# Patient Record
Sex: MALE | Race: BLACK OR AFRICAN AMERICAN | NOT HISPANIC OR LATINO | URBAN - METROPOLITAN AREA
[De-identification: names, ages, dates, MRNs, and addresses within clinical notes are randomized per-mention and may not be internally consistent; named-entity substitution may affect disease eponyms.]

---

## 2017-04-24 ENCOUNTER — INPATIENT (INPATIENT)
Facility: HOSPITAL | Age: 82
LOS: 3 days | Discharge: HOME | DRG: 378 | End: 2017-04-28
Attending: SURGERY | Admitting: SURGERY
Payer: MEDICARE

## 2017-04-24 VITALS
RESPIRATION RATE: 18 BRPM | WEIGHT: 160.06 LBS | SYSTOLIC BLOOD PRESSURE: 136 MMHG | HEIGHT: 66 IN | HEART RATE: 68 BPM | TEMPERATURE: 97 F | OXYGEN SATURATION: 94 % | DIASTOLIC BLOOD PRESSURE: 82 MMHG

## 2017-04-24 DIAGNOSIS — Z98.89 OTHER SPECIFIED POSTPROCEDURAL STATES: Chronic | ICD-10-CM

## 2017-04-24 LAB
ALBUMIN SERPL ELPH-MCNC: 3.2 G/DL — LOW (ref 3.4–5)
ALP SERPL-CCNC: 41 U/L — SIGNIFICANT CHANGE UP (ref 40–120)
ALT FLD-CCNC: 25 U/L — SIGNIFICANT CHANGE UP (ref 12–42)
ANION GAP SERPL CALC-SCNC: 3 MMOL/L — LOW (ref 9–16)
AST SERPL-CCNC: 33 U/L — SIGNIFICANT CHANGE UP (ref 15–37)
BASOPHILS NFR BLD AUTO: 0.4 % — SIGNIFICANT CHANGE UP (ref 0–2)
BASOPHILS NFR BLD AUTO: 0.5 % — SIGNIFICANT CHANGE UP (ref 0–2)
BILIRUB SERPL-MCNC: 0.7 MG/DL — SIGNIFICANT CHANGE UP (ref 0.2–1.2)
BLD GP AB SCN SERPL QL: NEGATIVE — SIGNIFICANT CHANGE UP
BUN SERPL-MCNC: 22 MG/DL — SIGNIFICANT CHANGE UP (ref 7–23)
CALCIUM SERPL-MCNC: 8.8 MG/DL — SIGNIFICANT CHANGE UP (ref 8.5–10.5)
CHLORIDE SERPL-SCNC: 108 MMOL/L — SIGNIFICANT CHANGE UP (ref 96–108)
CO2 SERPL-SCNC: 29 MMOL/L — SIGNIFICANT CHANGE UP (ref 22–31)
CREAT SERPL-MCNC: 0.76 MG/DL — SIGNIFICANT CHANGE UP (ref 0.5–1.3)
EOSINOPHIL NFR BLD AUTO: 0.4 % — SIGNIFICANT CHANGE UP (ref 0–6)
EOSINOPHIL NFR BLD AUTO: 0.7 % — SIGNIFICANT CHANGE UP (ref 0–6)
GLUCOSE SERPL-MCNC: 100 MG/DL — HIGH (ref 70–99)
HCT VFR BLD CALC: 36 % — LOW (ref 39–50)
HCT VFR BLD CALC: 36.2 % — LOW (ref 39–50)
HCT VFR BLD CALC: 37.4 % — LOW (ref 39–50)
HGB BLD-MCNC: 12 G/DL — LOW (ref 13–17)
HGB BLD-MCNC: 12.1 G/DL — LOW (ref 13–17)
HGB BLD-MCNC: 12.6 G/DL — LOW (ref 13–17)
INR BLD: 3.85 — HIGH (ref 0.88–1.16)
LYMPHOCYTES # BLD AUTO: 29.4 % — SIGNIFICANT CHANGE UP (ref 13–44)
LYMPHOCYTES # BLD AUTO: 40.1 % — SIGNIFICANT CHANGE UP (ref 13–44)
MCHC RBC-ENTMCNC: 29.8 PG — SIGNIFICANT CHANGE UP (ref 27–34)
MCHC RBC-ENTMCNC: 29.9 PG — SIGNIFICANT CHANGE UP (ref 27–34)
MCHC RBC-ENTMCNC: 30 PG — SIGNIFICANT CHANGE UP (ref 27–34)
MCHC RBC-ENTMCNC: 33.3 G/DL — SIGNIFICANT CHANGE UP (ref 32–36)
MCHC RBC-ENTMCNC: 33.4 G/DL — SIGNIFICANT CHANGE UP (ref 32–36)
MCHC RBC-ENTMCNC: 33.7 G/DL — SIGNIFICANT CHANGE UP (ref 32–36)
MCV RBC AUTO: 89 FL — SIGNIFICANT CHANGE UP (ref 80–100)
MCV RBC AUTO: 89.2 FL — SIGNIFICANT CHANGE UP (ref 80–100)
MCV RBC AUTO: 89.6 FL — SIGNIFICANT CHANGE UP (ref 80–100)
MONOCYTES NFR BLD AUTO: 11.6 % — SIGNIFICANT CHANGE UP (ref 2–14)
MONOCYTES NFR BLD AUTO: 18.1 % — HIGH (ref 2–14)
NEUTROPHILS NFR BLD AUTO: 47.1 % — SIGNIFICANT CHANGE UP (ref 43–77)
NEUTROPHILS NFR BLD AUTO: 51.7 % — SIGNIFICANT CHANGE UP (ref 43–77)
PLATELET # BLD AUTO: 458 K/UL — HIGH (ref 150–400)
PLATELET # BLD AUTO: 486 K/UL — HIGH (ref 150–400)
PLATELET # BLD AUTO: 497 K/UL — HIGH (ref 150–400)
POTASSIUM SERPL-MCNC: 4.8 MMOL/L — SIGNIFICANT CHANGE UP (ref 3.5–5.3)
POTASSIUM SERPL-SCNC: 4.8 MMOL/L — SIGNIFICANT CHANGE UP (ref 3.5–5.3)
PROT SERPL-MCNC: 8.4 G/DL — HIGH (ref 6.4–8.2)
PROTHROM AB SERPL-ACNC: 43.9 SEC — HIGH (ref 9.8–12.7)
RBC # BLD: 4.02 M/UL — LOW (ref 4.2–5.8)
RBC # BLD: 4.06 M/UL — LOW (ref 4.2–5.8)
RBC # BLD: 4.2 M/UL — SIGNIFICANT CHANGE UP (ref 4.2–5.8)
RBC # FLD: 15.5 % — SIGNIFICANT CHANGE UP (ref 10.3–16.9)
RBC # FLD: 15.8 % — SIGNIFICANT CHANGE UP (ref 10.3–16.9)
RBC # FLD: 15.8 % — SIGNIFICANT CHANGE UP (ref 10.3–16.9)
RH IG SCN BLD-IMP: POSITIVE — SIGNIFICANT CHANGE UP
SODIUM SERPL-SCNC: 140 MMOL/L — SIGNIFICANT CHANGE UP (ref 135–145)
WBC # BLD: 4.4 K/UL — SIGNIFICANT CHANGE UP (ref 3.8–10.5)
WBC # BLD: 5 K/UL — SIGNIFICANT CHANGE UP (ref 3.8–10.5)
WBC # BLD: 5 K/UL — SIGNIFICANT CHANGE UP (ref 3.8–10.5)
WBC # FLD AUTO: 4.4 K/UL — SIGNIFICANT CHANGE UP (ref 3.8–10.5)
WBC # FLD AUTO: 5 K/UL — SIGNIFICANT CHANGE UP (ref 3.8–10.5)
WBC # FLD AUTO: 5 K/UL — SIGNIFICANT CHANGE UP (ref 3.8–10.5)

## 2017-04-24 PROCEDURE — 99285 EMERGENCY DEPT VISIT HI MDM: CPT | Mod: 25

## 2017-04-24 PROCEDURE — 93010 ELECTROCARDIOGRAM REPORT: CPT

## 2017-04-24 RX ORDER — SODIUM CHLORIDE 9 MG/ML
500 INJECTION INTRAMUSCULAR; INTRAVENOUS; SUBCUTANEOUS ONCE
Qty: 0 | Refills: 0 | Status: COMPLETED | OUTPATIENT
Start: 2017-04-24 | End: 2017-04-24

## 2017-04-24 RX ORDER — SOD SULF/SODIUM/NAHCO3/KCL/PEG
4000 SOLUTION, RECONSTITUTED, ORAL ORAL ONCE
Qty: 0 | Refills: 0 | Status: COMPLETED | OUTPATIENT
Start: 2017-04-24 | End: 2017-04-25

## 2017-04-24 RX ORDER — SODIUM CHLORIDE 9 MG/ML
1000 INJECTION, SOLUTION INTRAVENOUS
Qty: 0 | Refills: 0 | Status: DISCONTINUED | OUTPATIENT
Start: 2017-04-24 | End: 2017-04-27

## 2017-04-24 RX ORDER — PHYTONADIONE (VIT K1) 5 MG
10 TABLET ORAL ONCE
Qty: 0 | Refills: 0 | Status: COMPLETED | OUTPATIENT
Start: 2017-04-24 | End: 2017-04-24

## 2017-04-24 RX ORDER — TAMSULOSIN HYDROCHLORIDE 0.4 MG/1
0.4 CAPSULE ORAL AT BEDTIME
Qty: 0 | Refills: 0 | Status: DISCONTINUED | OUTPATIENT
Start: 2017-04-24 | End: 2017-04-28

## 2017-04-24 RX ORDER — LATANOPROST 0.05 MG/ML
1 SOLUTION/ DROPS OPHTHALMIC; TOPICAL AT BEDTIME
Qty: 0 | Refills: 0 | Status: DISCONTINUED | OUTPATIENT
Start: 2017-04-24 | End: 2017-04-28

## 2017-04-24 RX ORDER — PANTOPRAZOLE SODIUM 20 MG/1
40 TABLET, DELAYED RELEASE ORAL ONCE
Qty: 0 | Refills: 0 | Status: COMPLETED | OUTPATIENT
Start: 2017-04-24 | End: 2017-04-24

## 2017-04-24 RX ORDER — BRIMONIDINE TARTRATE 2 MG/MG
1 SOLUTION/ DROPS OPHTHALMIC
Qty: 0 | Refills: 0 | Status: DISCONTINUED | OUTPATIENT
Start: 2017-04-24 | End: 2017-04-28

## 2017-04-24 RX ADMIN — LATANOPROST 1 DROP(S): 0.05 SOLUTION/ DROPS OPHTHALMIC; TOPICAL at 23:50

## 2017-04-24 RX ADMIN — TAMSULOSIN HYDROCHLORIDE 0.4 MILLIGRAM(S): 0.4 CAPSULE ORAL at 23:48

## 2017-04-24 RX ADMIN — SODIUM CHLORIDE 500 MILLILITER(S): 9 INJECTION INTRAMUSCULAR; INTRAVENOUS; SUBCUTANEOUS at 14:11

## 2017-04-24 RX ADMIN — SODIUM CHLORIDE 500 MILLILITER(S): 9 INJECTION INTRAMUSCULAR; INTRAVENOUS; SUBCUTANEOUS at 19:47

## 2017-04-24 RX ADMIN — Medication 102 MILLIGRAM(S): at 20:55

## 2017-04-24 RX ADMIN — PANTOPRAZOLE SODIUM 40 MILLIGRAM(S): 20 TABLET, DELAYED RELEASE ORAL at 14:11

## 2017-04-24 NOTE — H&P ADULT - NSHPPHYSICALEXAM_GEN_ALL_CORE
Vital Signs Last 24 Hrs  T(C): 36.6, Max: 36.8 (04-24 @ 14:30)  T(F): 97.8, Max: 98.3 (04-24 @ 14:30)  HR: 85 (68 - 85)  BP: 139/77 (136/82 - 139/77)  BP(mean): --  RR: 18 (18 - 18)  SpO2: 97% (94% - 97%)    Physical Exam:   Gen: AOx3, pleasant in NAD  Cor: RRR, +S1S2, no MRG  Pulm: CTA b/l No W/R/S  Abd: +BS, Soft, non distended, non tender. Rectal exam with visible external hemorrhoids, non tender, anal sphincter with normal tone, examiners glove positive for BRB  Ext: WWP, edema +1

## 2017-04-24 NOTE — H&P ADULT - ASSESSMENT
81M pt with BRBPR    -Admit to Surgery, ACS, Stacy, Telemetry  -NPO  -IVF  -Strict I/O, if perfusion status unclear, place díaz  -CBC q6h  -Given 10mg Vit K in ED for INR 3.85, f/u AM INR, Hematology on Board, Dr. Parks - F/U recs  -Lower extremity Doppler to assess for DVT, as per Dr. Parks, will reconsider anticoagulation in the future, if deemed necessary once outweighing risks and benefits  -GI consult - Golytely prep, Colonoscopy in AM   -If Large BRBPR or HDUS will obtain CTA  -Home eyedrops for Glaucoma

## 2017-04-24 NOTE — ED ADULT NURSE NOTE - OBJECTIVE STATEMENT
Pt presents to ED A&Ox3 c/o bloody stools since this morning. pt denies cp/sob, n/v/d, abdominal pain, fevers. abdomen soft, nontender, non distended. pt on coumadin.

## 2017-04-24 NOTE — ED PROVIDER NOTE - PHYSICAL EXAMINATION
CONSTITUTIONAL: Well appearing, well nourished, awake, alert and in no apparent distress.  HEENT: Head is atraumatic. Eyes clear bilaterally, normal EOMI. Airway patent.  CARDIAC: Normal rate, regular rhythm.  Heart sounds S1, S2.   RESPIRATORY: Breath sounds clear and equal bilaterally.  GASTROINTESTINAL: Abdomen soft, non-tender, no guarding. Rectal: dark red blood mixed with mucus in vault, no active bleeding.  MUSCULOSKELETAL: Spine appears normal, range of motion is not limited, no muscle or joint tenderness.   NEUROLOGICAL: Alert and oriented, no focal deficits, no motor or sensory deficits.  SKIN: Skin normal color for race, warm, dry and intact. No evidence of rash.  PSYCHIATRIC: Alert and oriented to person, place, time/situation. normal mood and affect. no apparent risk to self or others.

## 2017-04-24 NOTE — ED PROVIDER NOTE - OBJECTIVE STATEMENT
[age] yo hx of essential thrombocytopenia, multiple myeloma, prostate CA w/  few episodes of bloody bowel movements started this morning, on coumadin for LE DVTs. no abd pain, n/v, fever. No lightheadedness, syncope, weakness.

## 2017-04-24 NOTE — H&P ADULT - NSHPLABSRESULTS_GEN_ALL_CORE
LABS:  CBC Full  -  ( 24 Apr 2017 18:26 )  WBC Count : 5.0 K/uL  Hemoglobin : 12.6 g/dL  Hematocrit : 37.4 %  Platelet Count - Automated : 497 K/uL  Mean Cell Volume : 89.0 fL  Mean Cell Hemoglobin : 30.0 pg  Mean Cell Hemoglobin Concentration : 33.7 g/dL  Auto Neutrophil # : x  Auto Lymphocyte # : x  Auto Monocyte # : x  Auto Eosinophil # : x  Auto Basophil # : x  Auto Neutrophil % : x  Auto Lymphocyte % : x  Auto Monocyte % : x  Auto Eosinophil % : x  Auto Basophil % : x    04-24    140  |  108  |  22  ----------------------------<  100<H>  4.8   |  29  |  0.76    Ca    8.8      24 Apr 2017 13:25    TPro  8.4<H>  /  Alb  3.2<L>  /  TBili  0.7  /  DBili  x   /  AST  33  /  ALT  25  /  AlkPhos  41  04-24    PT/INR - ( 24 Apr 2017 13:25 )   PT: 43.9 sec;   INR: 3.85        PTT - ( 24 Apr 2017 13:25 )  PTT:50.6 sec

## 2017-04-24 NOTE — ED ADULT NURSE NOTE - CHPI ED SYMPTOMS NEG
no tingling/no pain/no nausea/no decreased eating/drinking/no dizziness/no fever/no chills/no vomiting/no weakness/no numbness

## 2017-04-24 NOTE — ED ADULT NURSE NOTE - PMH
BPH (benign prostatic hyperplasia)    DVT (deep venous thrombosis)    Glaucoma    Myeloma    Prostate cancer

## 2017-04-24 NOTE — ED PROVIDER NOTE - CHPI ED SYMPTOMS NEG
no dysuria/no fever/no burning urination/no chills/no nausea/no abdominal distension/no hematuria/no vomiting

## 2017-04-24 NOTE — H&P ADULT - HISTORY OF PRESENT ILLNESS
81M pt w/PMH Prostate Ca, Multiple myeloma, essential thrombocythemia, recurrent DVT of unspecified lower extremity (on coumadin as per Dr. Parks for recurrent DVT and 2 malignancies), BPH, and glaucoma, presents to [facility] ED w/3 episodes of BRBPR, started this morning at 8am, in ED patient had 6 episodes of BRBPR. Denies any previous episode. Denies any CP/SOB/PALP/LH.  Had a colonoscopy about 4 years ago, 1 benign polyp excised.     In ED patient is HDS, serial H/H stable,(Hgb persistently 12's), INR supratherapeutic at 3.85 given 10mg of Vit K.

## 2017-04-24 NOTE — ED ADULT NURSE REASSESSMENT NOTE - NS ED NURSE REASSESS COMMENT FT1
Pt report 4 bloody BM since being in ER. Dr Foster aware. repeat CBC sent. VSS. Will continue to monitor.

## 2017-04-24 NOTE — ED PROVIDER NOTE - MEDICAL DECISION MAKING DETAILS
Pt with essential thrombocytopenia, multiple myeloma, prostate CA w/  few episodes of bloody bowel movements started this morning, on coumadin for LE DVTs. Repeat CBCs obtained in ED stable, mentating well. Discussed care with pt's hematologist given complex hx, ok to give vit K, can hold on FFP/PCC now. discussed care with surgery, GI also- to admit to surgery for cont'd mgt.

## 2017-04-25 DIAGNOSIS — K92.2 GASTROINTESTINAL HEMORRHAGE, UNSPECIFIED: ICD-10-CM

## 2017-04-25 DIAGNOSIS — I82.409 ACUTE EMBOLISM AND THROMBOSIS OF UNSPECIFIED DEEP VEINS OF UNSPECIFIED LOWER EXTREMITY: ICD-10-CM

## 2017-04-25 DIAGNOSIS — D47.3 ESSENTIAL (HEMORRHAGIC) THROMBOCYTHEMIA: ICD-10-CM

## 2017-04-25 DIAGNOSIS — C90.00 MULTIPLE MYELOMA NOT HAVING ACHIEVED REMISSION: ICD-10-CM

## 2017-04-25 LAB
ANION GAP SERPL CALC-SCNC: 5 MMOL/L — LOW (ref 9–16)
ANION GAP SERPL CALC-SCNC: 6 MMOL/L — LOW (ref 9–16)
ANION GAP SERPL CALC-SCNC: 7 MMOL/L — LOW (ref 9–16)
APTT BLD: 31 SEC — SIGNIFICANT CHANGE UP (ref 27.5–37.4)
APTT BLD: 31.1 SEC — SIGNIFICANT CHANGE UP (ref 27.5–37.4)
APTT BLD: 38.7 SEC — HIGH (ref 27.5–37.4)
BUN SERPL-MCNC: 10 MG/DL — SIGNIFICANT CHANGE UP (ref 7–23)
BUN SERPL-MCNC: 14 MG/DL — SIGNIFICANT CHANGE UP (ref 7–23)
BUN SERPL-MCNC: 17 MG/DL — SIGNIFICANT CHANGE UP (ref 7–23)
CALCIUM SERPL-MCNC: 7.6 MG/DL — LOW (ref 8.5–10.5)
CALCIUM SERPL-MCNC: 7.8 MG/DL — LOW (ref 8.5–10.5)
CALCIUM SERPL-MCNC: 8.2 MG/DL — LOW (ref 8.5–10.5)
CHLORIDE SERPL-SCNC: 111 MMOL/L — HIGH (ref 96–108)
CHLORIDE SERPL-SCNC: 113 MMOL/L — HIGH (ref 96–108)
CHLORIDE SERPL-SCNC: 113 MMOL/L — HIGH (ref 96–108)
CO2 SERPL-SCNC: 22 MMOL/L — SIGNIFICANT CHANGE UP (ref 22–31)
CO2 SERPL-SCNC: 24 MMOL/L — SIGNIFICANT CHANGE UP (ref 22–31)
CO2 SERPL-SCNC: 24 MMOL/L — SIGNIFICANT CHANGE UP (ref 22–31)
CREAT SERPL-MCNC: 0.65 MG/DL — SIGNIFICANT CHANGE UP (ref 0.5–1.3)
CREAT SERPL-MCNC: 0.65 MG/DL — SIGNIFICANT CHANGE UP (ref 0.5–1.3)
CREAT SERPL-MCNC: 0.66 MG/DL — SIGNIFICANT CHANGE UP (ref 0.5–1.3)
GLUCOSE SERPL-MCNC: 100 MG/DL — HIGH (ref 70–99)
GLUCOSE SERPL-MCNC: 101 MG/DL — HIGH (ref 70–99)
GLUCOSE SERPL-MCNC: 131 MG/DL — HIGH (ref 70–99)
HCT VFR BLD CALC: 27.9 % — LOW (ref 39–50)
HCT VFR BLD CALC: 28.1 % — LOW (ref 39–50)
HCT VFR BLD CALC: 28.9 % — LOW (ref 39–50)
HGB BLD-MCNC: 9.2 G/DL — LOW (ref 13–17)
HGB BLD-MCNC: 9.2 G/DL — LOW (ref 13–17)
HGB BLD-MCNC: 9.3 G/DL — LOW (ref 13–17)
INR BLD: 1.13 — SIGNIFICANT CHANGE UP (ref 0.88–1.16)
INR BLD: 1.14 — SIGNIFICANT CHANGE UP (ref 0.88–1.16)
INR BLD: 2.25 — HIGH (ref 0.88–1.16)
MAGNESIUM SERPL-MCNC: 1.7 MG/DL — SIGNIFICANT CHANGE UP (ref 1.6–2.4)
MAGNESIUM SERPL-MCNC: 2 MG/DL — SIGNIFICANT CHANGE UP (ref 1.6–2.4)
MCHC RBC-ENTMCNC: 29.2 PG — SIGNIFICANT CHANGE UP (ref 27–34)
MCHC RBC-ENTMCNC: 29.7 PG — SIGNIFICANT CHANGE UP (ref 27–34)
MCHC RBC-ENTMCNC: 29.9 PG — SIGNIFICANT CHANGE UP (ref 27–34)
MCHC RBC-ENTMCNC: 32.2 G/DL — SIGNIFICANT CHANGE UP (ref 32–36)
MCHC RBC-ENTMCNC: 32.7 G/DL — SIGNIFICANT CHANGE UP (ref 32–36)
MCHC RBC-ENTMCNC: 33 G/DL — SIGNIFICANT CHANGE UP (ref 32–36)
MCV RBC AUTO: 90.6 FL — SIGNIFICANT CHANGE UP (ref 80–100)
PHOSPHATE SERPL-MCNC: 2 MG/DL — LOW (ref 2.5–4.5)
PHOSPHATE SERPL-MCNC: 3 MG/DL — SIGNIFICANT CHANGE UP (ref 2.5–4.5)
PLATELET # BLD AUTO: 368 K/UL — SIGNIFICANT CHANGE UP (ref 150–400)
PLATELET # BLD AUTO: 426 K/UL — HIGH (ref 150–400)
PLATELET # BLD AUTO: 435 K/UL — HIGH (ref 150–400)
POTASSIUM SERPL-MCNC: 4.3 MMOL/L — SIGNIFICANT CHANGE UP (ref 3.5–5.3)
POTASSIUM SERPL-MCNC: 4.6 MMOL/L — SIGNIFICANT CHANGE UP (ref 3.5–5.3)
POTASSIUM SERPL-MCNC: 4.7 MMOL/L — SIGNIFICANT CHANGE UP (ref 3.5–5.3)
POTASSIUM SERPL-SCNC: 4.3 MMOL/L — SIGNIFICANT CHANGE UP (ref 3.5–5.3)
POTASSIUM SERPL-SCNC: 4.6 MMOL/L — SIGNIFICANT CHANGE UP (ref 3.5–5.3)
POTASSIUM SERPL-SCNC: 4.7 MMOL/L — SIGNIFICANT CHANGE UP (ref 3.5–5.3)
PROTHROM AB SERPL-ACNC: 12.6 SEC — SIGNIFICANT CHANGE UP (ref 9.8–12.7)
PROTHROM AB SERPL-ACNC: 12.7 SEC — SIGNIFICANT CHANGE UP (ref 9.8–12.7)
PROTHROM AB SERPL-ACNC: 25.4 SEC — HIGH (ref 9.8–12.7)
RBC # BLD: 3.08 M/UL — LOW (ref 4.2–5.8)
RBC # BLD: 3.1 M/UL — LOW (ref 4.2–5.8)
RBC # BLD: 3.19 M/UL — LOW (ref 4.2–5.8)
RBC # FLD: 15.8 % — SIGNIFICANT CHANGE UP (ref 10.3–16.9)
RBC # FLD: 16 % — SIGNIFICANT CHANGE UP (ref 10.3–16.9)
RBC # FLD: 16 % — SIGNIFICANT CHANGE UP (ref 10.3–16.9)
SODIUM SERPL-SCNC: 140 MMOL/L — SIGNIFICANT CHANGE UP (ref 135–145)
SODIUM SERPL-SCNC: 142 MMOL/L — SIGNIFICANT CHANGE UP (ref 135–145)
SODIUM SERPL-SCNC: 143 MMOL/L — SIGNIFICANT CHANGE UP (ref 135–145)
WBC # BLD: 5 K/UL — SIGNIFICANT CHANGE UP (ref 3.8–10.5)
WBC # BLD: 6 K/UL — SIGNIFICANT CHANGE UP (ref 3.8–10.5)
WBC # BLD: 7.5 K/UL — SIGNIFICANT CHANGE UP (ref 3.8–10.5)
WBC # FLD AUTO: 5 K/UL — SIGNIFICANT CHANGE UP (ref 3.8–10.5)
WBC # FLD AUTO: 6 K/UL — SIGNIFICANT CHANGE UP (ref 3.8–10.5)
WBC # FLD AUTO: 7.5 K/UL — SIGNIFICANT CHANGE UP (ref 3.8–10.5)

## 2017-04-25 PROCEDURE — 99231 SBSQ HOSP IP/OBS SF/LOW 25: CPT | Mod: GC

## 2017-04-25 RX ORDER — MAGNESIUM SULFATE 500 MG/ML
1 VIAL (ML) INJECTION ONCE
Qty: 0 | Refills: 0 | Status: COMPLETED | OUTPATIENT
Start: 2017-04-25 | End: 2017-04-25

## 2017-04-25 RX ORDER — POTASSIUM PHOSPHATE, MONOBASIC POTASSIUM PHOSPHATE, DIBASIC 236; 224 MG/ML; MG/ML
15 INJECTION, SOLUTION INTRAVENOUS ONCE
Qty: 0 | Refills: 0 | Status: COMPLETED | OUTPATIENT
Start: 2017-04-25 | End: 2017-04-25

## 2017-04-25 RX ORDER — SODIUM CHLORIDE 9 MG/ML
1000 INJECTION, SOLUTION INTRAVENOUS ONCE
Qty: 0 | Refills: 0 | Status: COMPLETED | OUTPATIENT
Start: 2017-04-25 | End: 2017-04-25

## 2017-04-25 RX ADMIN — BRIMONIDINE TARTRATE 1 DROP(S): 2 SOLUTION/ DROPS OPHTHALMIC at 06:32

## 2017-04-25 RX ADMIN — Medication 100 GRAM(S): at 07:28

## 2017-04-25 RX ADMIN — Medication 4000 MILLILITER(S): at 01:00

## 2017-04-25 RX ADMIN — LATANOPROST 1 DROP(S): 0.05 SOLUTION/ DROPS OPHTHALMIC; TOPICAL at 21:53

## 2017-04-25 RX ADMIN — TAMSULOSIN HYDROCHLORIDE 0.4 MILLIGRAM(S): 0.4 CAPSULE ORAL at 21:52

## 2017-04-25 RX ADMIN — SODIUM CHLORIDE 4000 MILLILITER(S): 9 INJECTION, SOLUTION INTRAVENOUS at 03:00

## 2017-04-25 RX ADMIN — POTASSIUM PHOSPHATE, MONOBASIC POTASSIUM PHOSPHATE, DIBASIC 62.5 MILLIMOLE(S): 236; 224 INJECTION, SOLUTION INTRAVENOUS at 09:47

## 2017-04-25 RX ADMIN — BRIMONIDINE TARTRATE 1 DROP(S): 2 SOLUTION/ DROPS OPHTHALMIC at 21:52

## 2017-04-25 NOTE — CONSULT NOTE ADULT - SUBJECTIVE AND OBJECTIVE BOX
Hematology/Oncology Consult Note (Dr. Parks)    Patient is a 81y male PMHx of Essential Thrombocythemia on Hydrea, Myeloma off treatment, and history of DVT on chronic A/C with coumadin presents with 2 day history of BRBPR and melena. The patient denies ever having these symptoms prior. No associated N/V/D SOB or chest pain. On admission was noted to have supertheraputic INR. Was given Vit K and Kcentra and is planning on going for colonoscopy this AM. the patient is currently feeling well no complaints, still having melena and BRBPR overnight     ROS is otherwise negative.    PAST MEDICAL & SURGICAL HISTORY:  BPH (benign prostatic hyperplasia)  Glaucoma  DVT (deep venous thrombosis)  Myeloma  Prostate cancer  H/O hernia repair      Allergies:      Social History:    FAMILY HISTORY:  No pertinent family history in first degree relatives      MEDICATIONS  (STANDING):  lactated ringers. 1000milliLiter(s) IV Continuous <Continuous>  latanoprost 0.005% Ophthalmic Solution 1Drop(s) Both EYES at bedtime  brimonidine 0.2% Ophthalmic Solution 1Drop(s) Both EYES two times a day  tamsulosin 0.4milliGRAM(s) Oral at bedtime  potassium phosphate IVPB 15milliMole(s) IV Intermittent once    MEDICATIONS  (PRN):      PHYSICAL EXAM:    T(F): 97, Max: 98.3 (04-24 @ 14:30)  HR: 82 (66 - 85)  BP: 134/78 (118/66 - 168/75)  RR: 22 (15 - 25)  SpO2: 99% (94% - 99%)  Wt(kg): --    Daily Height in cm: 167.64 (24 Apr 2017 12:43)    Daily       CBC Full  -  ( 25 Apr 2017 03:15 )  WBC Count : 5.0 K/uL  Hemoglobin : 9.2 g/dL  Hematocrit : 27.9 %  Platelet Count - Automated : 368 K/uL  Mean Cell Volume : 90.6 fL  Mean Cell Hemoglobin : 29.9 pg  Mean Cell Hemoglobin Concentration : 33.0 g/dL  Auto Neutrophil # : x  Auto Lymphocyte # : x  Auto Monocyte # : x  Auto Eosinophil # : x  Auto Basophil # : x  Auto Neutrophil % : x  Auto Lymphocyte % : x  Auto Monocyte % : x  Auto Eosinophil % : x  Auto Basophil % : x      04-25    140  |  111<H>  |  17  ----------------------------<  131<H>  4.3   |  22  |  0.66    Ca    8.2<L>      25 Apr 2017 02:39  Phos  2.0     04-25  Mg     1.7     04-25    TPro  8.4<H>  /  Alb  3.2<L>  /  TBili  0.7  /  DBili  x   /  AST  33  /  ALT  25  /  AlkPhos  41  04-24      PT/INR - ( 25 Apr 2017 02:42 )   PT: 25.4 sec;   INR: 2.25          PTT - ( 25 Apr 2017 02:42 )  PTT:38.7 sec

## 2017-04-25 NOTE — CHART NOTE - NSCHARTNOTEFT_GEN_A_CORE
RAPID RESPONSE:   Rapid response called on patient for unresponsiveness while having BM. Patient was having large BM on comode when he became unresponsive. Assessed immediately at bedside with PGY2 ICU resident Dr. Lynn. Patient was awake, alert, and AVSS at the time. Patient continued to have BM and labs were drawn. At this time, patient again had a syncopal episode while on comode, but responded to sternal rub. Patient became diaphoretic and SBP dropped to 87. At this time, 2L of fluid were bolused, patient transferred to bed and chief resident was made aware.     Decision made to continue to closely monitor patient. Will follow up repeat CBC and determine whether patient requires blood transfusion. Will hold off on CTA as patient is currently scheduled for colonoscopy in AM and all vitals have remained stable.

## 2017-04-25 NOTE — CONSULT NOTE ADULT - ASSESSMENT
A/P: Patient is a 81y male PMHx of Essential Thrombocythemia on Hydrea, Myeloma off treatment, and history of DVT on chronic A/C with coumadin presents with GI bleed

## 2017-04-25 NOTE — CONSULT NOTE ADULT - PROBLEM SELECTOR RECOMMENDATION 9
- Patient with lower GI bleed, plan for colonoscopy and EGD this Am to find source.   - Please do not give anymore Kcentra unless nessessary as it is prothrombotic and the patient has a history of ET and DVT. Please give FFP to achieve INR < 1.5

## 2017-04-25 NOTE — CONSULT NOTE ADULT - SUBJECTIVE AND OBJECTIVE BOX
HPI:     81M pt w/PMH Prostate Ca, Multiple myeloma, essential thrombocythemia, recurrent DVT of unspecified lower extremity (on coumadin as per Dr. Parks for recurrent DVT and 2 malignancies), BPH, and glaucoma, presents to [facility] ED w/3 episodes of BRBPR, started this morning at 8am, in ED patient had 6 episodes of BRBPR. Denies any previous episode. Denies any CP/SOB/PALP/LH. Pt found to have supratherapeutic INR.  Overnight pts had large volume bloody BM and dropped hgb from 12 -->9        Had a colonoscopy about 4 years ago, 1 benign polyp excised.       PAST MEDICAL & SURGICAL HISTORY:  BPH (benign prostatic hyperplasia)  Glaucoma  DVT (deep venous thrombosis)  Myeloma  Prostate cancer  H/O hernia repair  	    MEDICATIONS  (STANDING):  lactated ringers. 1000milliLiter(s) IV Continuous <Continuous>  latanoprost 0.005% Ophthalmic Solution 1Drop(s) Both EYES at bedtime  brimonidine 0.2% Ophthalmic Solution 1Drop(s) Both EYES two times a day  tamsulosin 0.4milliGRAM(s) Oral at bedtime  potassium phosphate IVPB 15milliMole(s) IV Intermittent once    MEDICATIONS  (PRN):      Allergies    No Known Allergies    Intolerances        SOCIAL HISTORY: denies toxic habits     FAMILY HISTORY:  No pertinent family history in first degree relatives      Vital Signs Last 24 Hrs  T(C): 35.6, Max: 36.8 (04-24 @ 14:30)  T(F): 96.1, Max: 98.3 (04-24 @ 14:30)  HR: 82 (66 - 85)  BP: 134/78 (118/66 - 168/75)  BP(mean): 107 (86 - 107)  RR: 22 (15 - 25)  SpO2: 99% (94% - 99%)    PHYSICAL EXAM:    GEN: well appearing, NAD, AOx3  HEENT: anicteric  CHEST: no w/r/r  CVS: no m/r/g  ABD: soft, nt, nd bs+  RECTAL: pink-tinged blood       LABS:                        9.2    5.0   )-----------( 368      ( 25 Apr 2017 03:15 )             27.9     04-25    140  |  111<H>  |  17  ----------------------------<  131<H>  4.3   |  22  |  0.66    Ca    8.2<L>      25 Apr 2017 02:39  Phos  2.0     04-25  Mg     1.7     04-25    TPro  8.4<H>  /  Alb  3.2<L>  /  TBili  0.7  /  DBili  x   /  AST  33  /  ALT  25  /  AlkPhos  41  04-24    PT/INR - ( 25 Apr 2017 02:42 )   PT: 25.4 sec;   INR: 2.25          PTT - ( 25 Apr 2017 02:42 )  PTT:38.7 sec      RADIOLOGY & ADDITIONAL STUDIES:

## 2017-04-25 NOTE — CONSULT NOTE ADULT - ASSESSMENT
81M pt w/PMH Prostate Ca, Multiple myeloma, essential thrombocythemia, recurrent DVT of unspecified lower extremity (on AC) presents w/BRBPR, Pt found to have supratherapeutic INR.and subsequent large volume bloody BM and dropped hgb from 12 -->9.    -Golytely prep complete  -Goal INR <1.7   -Keep NPO   -Transfuse hgb >7  -Colonoscopy today.

## 2017-04-25 NOTE — PROGRESS NOTE ADULT - SUBJECTIVE AND OBJECTIVE BOX
O/N: admitted w/ LGIB. cont to have brbpr. RRT called for vasovagal episode while having BM on comode, assessed by team; SBP in 80s, bolused and responded appropriately. Hgb shows H/H dec  from 12.1 to 9.2, given KCentra. Stable, cont to have bloody bm and drinking GoLytely :       SUBJECTIVE: Patient seen and examined bedside by chief resident.  O/N: admitted w/ LGIB. cont to have brbpr. RRT called for vasovagal episode while having BM on comode, assessed by team; SBP in 80s, bolused and responded appropriately. Hgb shows H/H dec  from 12.1 to 9.2, given KCentra. Stable, cont to have bloody bm and drinking GoLytely    tamsulosin 0.4milliGRAM(s) Oral at bedtime      Vital Signs Last 24 Hrs  T(C): 35.6, Max: 36.8 (04-24 @ 14:30)  T(F): 96.1, Max: 98.3 (04-24 @ 14:30)  HR: 82 (66 - 85)  BP: 134/78 (118/66 - 168/75)  BP(mean): 107 (86 - 107)  RR: 22 (15 - 25)  SpO2: 99% (94% - 99%)  I&O's Detail    I & Os for current day (as of 25 Apr 2017 07:09)  =============================================  IN:    0.9% NaCl: 1000 ml    lactated ringers.: 560 ml    Total IN: 1560 ml  ---------------------------------------------  OUT:    Stool: 200 ml    Ureteral Catheter: 175 ml    Total OUT: 375 ml  ---------------------------------------------  Total NET: 1185 ml      General: NAD, resting comfortably in bed  C/V: NSR  Pulm: Nonlabored breathing, no respiratory distress  Abd: soft, NT/ND.  Extrem: WWP, no edema, SCDs in place        LABS:                        9.2    5.0   )-----------( 368      ( 25 Apr 2017 03:15 )             27.9     04-25    140  |  111<H>  |  17  ----------------------------<  131<H>  4.3   |  22  |  0.66    Ca    8.2<L>      25 Apr 2017 02:39  Phos  2.0     04-25  Mg     1.7     04-25    TPro  8.4<H>  /  Alb  3.2<L>  /  TBili  0.7  /  DBili  x   /  AST  33  /  ALT  25  /  AlkPhos  41  04-24    PT/INR - ( 25 Apr 2017 02:42 )   PT: 25.4 sec;   INR: 2.25          PTT - ( 25 Apr 2017 02:42 )  PTT:38.7 sec      RADIOLOGY & ADDITIONAL STUDIES:

## 2017-04-25 NOTE — PROGRESS NOTE ADULT - ASSESSMENT
81M pt with BRBPR    -NPO  -IVF  -Strict I/O, if perfusion status unclear, place díaz  -CBC q6h  -KCentra   -Lower extremity Doppler to assess for DVT, as per Dr. Parks, will reconsider anticoagulation in the future, if deemed necessary once outweighing risks and benefits  -GI consult - Golytely prep, Colonoscopy in AM   -Home eyedrops for Glaucoma

## 2017-04-25 NOTE — CONSULT NOTE ADULT - PROBLEM SELECTOR RECOMMENDATION 2
- Patient with history of DVT on chronic A/C with coumadin   - Please get dopplers of b/l lower extemities to see if DVT is still present

## 2017-04-26 LAB
ANION GAP SERPL CALC-SCNC: 7 MMOL/L — LOW (ref 9–16)
BUN SERPL-MCNC: 7 MG/DL — SIGNIFICANT CHANGE UP (ref 7–23)
CALCIUM SERPL-MCNC: 7.4 MG/DL — LOW (ref 8.5–10.5)
CHLORIDE SERPL-SCNC: 111 MMOL/L — HIGH (ref 96–108)
CO2 SERPL-SCNC: 25 MMOL/L — SIGNIFICANT CHANGE UP (ref 22–31)
CREAT SERPL-MCNC: 0.63 MG/DL — SIGNIFICANT CHANGE UP (ref 0.5–1.3)
GLUCOSE SERPL-MCNC: 104 MG/DL — HIGH (ref 70–99)
HCT VFR BLD CALC: 24.4 % — LOW (ref 39–50)
HCT VFR BLD CALC: 24.6 % — LOW (ref 39–50)
HGB BLD-MCNC: 8 G/DL — LOW (ref 13–17)
HGB BLD-MCNC: 8.2 G/DL — LOW (ref 13–17)
MAGNESIUM SERPL-MCNC: 1.8 MG/DL — SIGNIFICANT CHANGE UP (ref 1.6–2.4)
MCHC RBC-ENTMCNC: 29.2 PG — SIGNIFICANT CHANGE UP (ref 27–34)
MCHC RBC-ENTMCNC: 30.1 PG — SIGNIFICANT CHANGE UP (ref 27–34)
MCHC RBC-ENTMCNC: 32.8 G/DL — SIGNIFICANT CHANGE UP (ref 32–36)
MCHC RBC-ENTMCNC: 33.3 G/DL — SIGNIFICANT CHANGE UP (ref 32–36)
MCV RBC AUTO: 89.1 FL — SIGNIFICANT CHANGE UP (ref 80–100)
MCV RBC AUTO: 90.4 FL — SIGNIFICANT CHANGE UP (ref 80–100)
PHOSPHATE SERPL-MCNC: 2.2 MG/DL — LOW (ref 2.5–4.5)
PLATELET # BLD AUTO: 372 K/UL — SIGNIFICANT CHANGE UP (ref 150–400)
PLATELET # BLD AUTO: 414 K/UL — HIGH (ref 150–400)
POTASSIUM SERPL-MCNC: 3.9 MMOL/L — SIGNIFICANT CHANGE UP (ref 3.5–5.3)
POTASSIUM SERPL-SCNC: 3.9 MMOL/L — SIGNIFICANT CHANGE UP (ref 3.5–5.3)
RBC # BLD: 2.72 M/UL — LOW (ref 4.2–5.8)
RBC # BLD: 2.74 M/UL — LOW (ref 4.2–5.8)
RBC # FLD: 15.8 % — SIGNIFICANT CHANGE UP (ref 10.3–16.9)
RBC # FLD: 16.1 % — SIGNIFICANT CHANGE UP (ref 10.3–16.9)
SODIUM SERPL-SCNC: 143 MMOL/L — SIGNIFICANT CHANGE UP (ref 135–145)
WBC # BLD: 6.5 K/UL — SIGNIFICANT CHANGE UP (ref 3.8–10.5)
WBC # BLD: 6.6 K/UL — SIGNIFICANT CHANGE UP (ref 3.8–10.5)
WBC # FLD AUTO: 6.5 K/UL — SIGNIFICANT CHANGE UP (ref 3.8–10.5)
WBC # FLD AUTO: 6.6 K/UL — SIGNIFICANT CHANGE UP (ref 3.8–10.5)

## 2017-04-26 PROCEDURE — 99231 SBSQ HOSP IP/OBS SF/LOW 25: CPT | Mod: GC

## 2017-04-26 PROCEDURE — 93970 EXTREMITY STUDY: CPT | Mod: 26

## 2017-04-26 RX ORDER — KETOROLAC TROMETHAMINE 30 MG/ML
30 SYRINGE (ML) INJECTION EVERY 6 HOURS
Qty: 0 | Refills: 0 | Status: DISCONTINUED | OUTPATIENT
Start: 2017-04-26 | End: 2017-04-26

## 2017-04-26 RX ORDER — SODIUM,POTASSIUM PHOSPHATES 278-250MG
2 POWDER IN PACKET (EA) ORAL ONCE
Qty: 0 | Refills: 0 | Status: COMPLETED | OUTPATIENT
Start: 2017-04-26 | End: 2017-04-26

## 2017-04-26 RX ADMIN — TAMSULOSIN HYDROCHLORIDE 0.4 MILLIGRAM(S): 0.4 CAPSULE ORAL at 21:36

## 2017-04-26 RX ADMIN — BRIMONIDINE TARTRATE 1 DROP(S): 2 SOLUTION/ DROPS OPHTHALMIC at 21:36

## 2017-04-26 RX ADMIN — Medication 2 TABLET(S): at 10:34

## 2017-04-26 RX ADMIN — BRIMONIDINE TARTRATE 1 DROP(S): 2 SOLUTION/ DROPS OPHTHALMIC at 11:00

## 2017-04-26 RX ADMIN — LATANOPROST 1 DROP(S): 0.05 SOLUTION/ DROPS OPHTHALMIC; TOPICAL at 22:38

## 2017-04-26 NOTE — PROGRESS NOTE ADULT - PROBLEM SELECTOR PLAN 1
- Patient s/p colonoscopy and EGD, no source of GI bleed identified. Currently not bleeding anymore, ? further evaluation by GI for - Patient s/p colonoscopy and EGD, no source of GI bleed identified. Currently not bleeding anymore, ? further evaluation by GI (capsule) as the patient needs A/C for his recurrent DVT. Keep Hgb > 7

## 2017-04-26 NOTE — PROGRESS NOTE ADULT - SUBJECTIVE AND OBJECTIVE BOX
Pt seen and examined at bedside. No acute overnight events. Pt resting comfortably. No further episodes of bleeding.     REVIEW OF SYSTEMS:  Constitutional: No fever, weight loss or fatigue  Cardiovascular: No chest pain, palpitations, dizziness or leg swelling  Gastrointestinal: No abdominal or epigastric pain. No nausea, vomiting or hematemesis; No diarrhea or constipation. No melena or hematochezia.  Skin: No itching, burning, rashes or lesions       MEDICATIONS:  MEDICATIONS  (STANDING):  lactated ringers. 1000milliLiter(s) IV Continuous <Continuous>  latanoprost 0.005% Ophthalmic Solution 1Drop(s) Both EYES at bedtime  brimonidine 0.2% Ophthalmic Solution 1Drop(s) Both EYES two times a day  tamsulosin 0.4milliGRAM(s) Oral at bedtime  potassium acid phosphate/sodium acid phosphate tablet (K-PHOS No. 2) 2Tablet(s) Oral once    MEDICATIONS  (PRN):      Allergies    No Known Allergies    Intolerances        Vital Signs Last 24 Hrs  T(C): 36.9, Max: 36.9 (04-26 @ 09:05)  T(F): 98.4, Max: 98.4 (04-26 @ 09:05)  HR: 74 (60 - 74)  BP: 116/63 (105/58 - 152/72)  BP(mean): 79 (76 - 99)  RR: 19 (14 - 19)  SpO2: 94% (94% - 100%)    I & Os for current day (as of 04-26 @ 11:38)  =============================================  IN: 560 ml / OUT: 3600 ml / NET: -3040 ml      PHYSICAL EXAM:    General: Well developed; well nourished; in no acute distress  HEENT: MMM, conjunctiva and sclera clear  Gastrointestinal: Soft non-tender non-distended; Normal bowel sounds    LABS:      CBC Full  -  ( 26 Apr 2017 06:14 )  WBC Count : 6.6 K/uL  Hemoglobin : 8.0 g/dL  Hematocrit : 24.4 %  Platelet Count - Automated : 372 K/uL  Mean Cell Volume : 89.1 fL  Mean Cell Hemoglobin : 29.2 pg  Mean Cell Hemoglobin Concentration : 32.8 g/dL  Auto Neutrophil # : x  Auto Lymphocyte # : x  Auto Monocyte # : x  Auto Eosinophil # : x  Auto Basophil # : x  Auto Neutrophil % : x  Auto Lymphocyte % : x  Auto Monocyte % : x  Auto Eosinophil % : x  Auto Basophil % : x    04-26    143  |  111<H>  |  7   ----------------------------<  104<H>  3.9   |  25  |  0.63    Ca    7.4<L>      26 Apr 2017 06:15  Phos  2.2     04-26  Mg     1.8     04-26    TPro  8.4<H>  /  Alb  3.2<L>  /  TBili  0.7  /  DBili  x   /  AST  33  /  ALT  25  /  AlkPhos  41  04-24    PT/INR - ( 25 Apr 2017 13:16 )   PT: 12.6 sec;   INR: 1.13          PTT - ( 25 Apr 2017 13:16 )  PTT:31.0 sec                  RADIOLOGY & ADDITIONAL STUDIES (The following images were personally reviewed):

## 2017-04-26 NOTE — PROGRESS NOTE ADULT - PROBLEM SELECTOR PLAN 2
- Patient with recurrent DVT and CVA in the past on chronic A/C with coumadin. Given patients recurrent VTE & CVA in the setting of an MPN (ET - hypercoaguable) will likely need long term A/C. Will need to rechallenge the patient as an inpatient once GI evaluation is complete.   - Please check LE dopplers

## 2017-04-26 NOTE — PROGRESS NOTE ADULT - ASSESSMENT
81M pt w/PMH Prostate Ca, Multiple myeloma, essential thrombocythemia, recurrent DVT of unspecified lower extremity (on AC) presents w/BRBPR, Pt found to have supratherapeutic INR.and subsequent large volume bloody BM and dropped hgb from 12 -->9. EGD unremarkable for active bleeding up D2. Colonoscopy showing multiple diverticuli with old blood up to terminal ileum without evidence of active bleeding. Will discuss VCE, although small bowel source unlikely.     1. BRBPR (likely 2/2 to diverticulosis with supratherapeutic INR)  -Monitor CBC, transfuse to hgb >7  -Clear liquid diet in anticipation of POSSIBLE VCE  -Heme on recs appreciated  -NPO at midnight.    GI following

## 2017-04-26 NOTE — PROGRESS NOTE ADULT - SUBJECTIVE AND OBJECTIVE BOX
Hematology/Oncology Progress Note (Dr. Parks)    Patient was seen and examined at bedside, feeling well, had colonoscopy yesterday polyp visualized but no active bleeding      ROS is otherwise negative.    PHYSICAL EXAM:    T(F): 98.4, Max: 98.4 (04-26 @ 09:05)  HR: 74 (60 - 74)  BP: 116/63 (105/58 - 152/72)  RR: 19 (14 - 19)  SpO2: 94% (94% - 100%)  Wt(kg): --    General: AAOx3, NAD  HEENT: No cervical adenopathy  Lungs: CTA B/L  Abdomen: Soft NT/ND + BS  Ext: b/l LE edema trace, no calf tenderness.       Medications:  MEDICATIONS  (STANDING):  lactated ringers. 1000milliLiter(s) IV Continuous <Continuous>  latanoprost 0.005% Ophthalmic Solution 1Drop(s) Both EYES at bedtime  brimonidine 0.2% Ophthalmic Solution 1Drop(s) Both EYES two times a day  tamsulosin 0.4milliGRAM(s) Oral at bedtime  potassium acid phosphate/sodium acid phosphate tablet (K-PHOS No. 2) 2Tablet(s) Oral once    MEDICATIONS  (PRN):      Labs:                          8.0    6.6   )-----------( 372      ( 26 Apr 2017 06:14 )             24.4     CBC Full  -  ( 26 Apr 2017 06:14 )  WBC Count : 6.6 K/uL  Hemoglobin : 8.0 g/dL  Hematocrit : 24.4 %  Platelet Count - Automated : 372 K/uL  Mean Cell Volume : 89.1 fL  Mean Cell Hemoglobin : 29.2 pg  Mean Cell Hemoglobin Concentration : 32.8 g/dL  Auto Neutrophil # : x  Auto Lymphocyte # : x  Auto Monocyte # : x  Auto Eosinophil # : x  Auto Basophil # : x  Auto Neutrophil % : x  Auto Lymphocyte % : x  Auto Monocyte % : x  Auto Eosinophil % : x  Auto Basophil % : x    PT/INR - ( 25 Apr 2017 13:16 )   PT: 12.6 sec;   INR: 1.13          PTT - ( 25 Apr 2017 13:16 )  PTT:31.0 sec  04-26    143  |  111<H>  |  7   ----------------------------<  104<H>  3.9   |  25  |  0.63    Ca    7.4<L>      26 Apr 2017 06:15  Phos  2.2     04-26  Mg     1.8     04-26    TPro  8.4<H>  /  Alb  3.2<L>  /  TBili  0.7  /  DBili  x   /  AST  33  /  ALT  25  /  AlkPhos  41  04-24      Pathology:    Imaging Studies: Hematology/Oncology Progress Note (Dr. Parks)    Patient was seen and examined at bedside, feeling well, had colonoscopy yesterday polyp visualized but no active bleeding      ROS is otherwise negative.    PHYSICAL EXAM:    T(F): 98.4, Max: 98.4 (04-26 @ 09:05)  HR: 74 (60 - 74)  BP: 116/63 (105/58 - 152/72)  RR: 19 (14 - 19)  SpO2: 94% (94% - 100%)  Wt(kg): --    General: AAOx3, NAD  HEENT: No cervical adenopathy  Lungs: CTA B/L  Heart: +S1 S2 RRR  Abdomen: Soft NT/ND + BS  Ext: b/l LE edema trace, no calf tenderness.       Medications:  MEDICATIONS  (STANDING):  lactated ringers. 1000milliLiter(s) IV Continuous <Continuous>  latanoprost 0.005% Ophthalmic Solution 1Drop(s) Both EYES at bedtime  brimonidine 0.2% Ophthalmic Solution 1Drop(s) Both EYES two times a day  tamsulosin 0.4milliGRAM(s) Oral at bedtime  potassium acid phosphate/sodium acid phosphate tablet (K-PHOS No. 2) 2Tablet(s) Oral once    MEDICATIONS  (PRN):      Labs:                          8.0    6.6   )-----------( 372      ( 26 Apr 2017 06:14 )             24.4     CBC Full  -  ( 26 Apr 2017 06:14 )  WBC Count : 6.6 K/uL  Hemoglobin : 8.0 g/dL  Hematocrit : 24.4 %  Platelet Count - Automated : 372 K/uL  Mean Cell Volume : 89.1 fL  Mean Cell Hemoglobin : 29.2 pg  Mean Cell Hemoglobin Concentration : 32.8 g/dL  Auto Neutrophil # : x  Auto Lymphocyte # : x  Auto Monocyte # : x  Auto Eosinophil # : x  Auto Basophil # : x  Auto Neutrophil % : x  Auto Lymphocyte % : x  Auto Monocyte % : x  Auto Eosinophil % : x  Auto Basophil % : x    PT/INR - ( 25 Apr 2017 13:16 )   PT: 12.6 sec;   INR: 1.13          PTT - ( 25 Apr 2017 13:16 )  PTT:31.0 sec  04-26    143  |  111<H>  |  7   ----------------------------<  104<H>  3.9   |  25  |  0.63    Ca    7.4<L>      26 Apr 2017 06:15  Phos  2.2     04-26  Mg     1.8     04-26    TPro  8.4<H>  /  Alb  3.2<L>  /  TBili  0.7  /  DBili  x   /  AST  33  /  ALT  25  /  AlkPhos  41  04-24      Pathology:    Imaging Studies:

## 2017-04-26 NOTE — PROGRESS NOTE ADULT - SUBJECTIVE AND OBJECTIVE BOX
O/N: No bleeding. AVSS.   4/25: AM H/H: stable at 9.2, INR 1.2, s/p scope: old heme throughout, L diverticulosis, EGD: no active bleeding, questionable ulcers, hold AC until tomrorrow, f/u w/ heme, CLD O/N: No bleeding. AVSS.   4/25: AM H/H: stable at 9.2, INR 1.2, s/p scope: old heme throughout, L diverticulosis, EGD: no active bleeding, questionable ulcers, hold AC until tomrorrow, f/u w/ heme, CLD      SUBJECTIVE:  Flatus: [x ] YES [ ] NO             Bowel Movement: [ x] YES [ ] NO  Pain (0-10):            Pain Control Adequate: [x ] YES [ ] NO  Nausea: [ ] YES [ x] NO            Vomiting: [ ] YES [x ] NO  Diarrhea: [ ] YES [x ] NO         Constipation: [ ] YES [ x] NO     Chest Pain: [ ] YES [ x] NO    SOB:  [ ] YES [x ] NO    MEDICATIONS  (STANDING):  lactated ringers. 1000milliLiter(s) IV Continuous <Continuous>  latanoprost 0.005% Ophthalmic Solution 1Drop(s) Both EYES at bedtime  brimonidine 0.2% Ophthalmic Solution 1Drop(s) Both EYES two times a day  tamsulosin 0.4milliGRAM(s) Oral at bedtime  potassium acid phosphate/sodium acid phosphate tablet (K-PHOS No. 2) 2Tablet(s) Oral once    MEDICATIONS  (PRN):      Vital Signs Last 24 Hrs  T(C): 36.7, Max: 36.7 (04-26 @ 05:37)  T(F): 98.1, Max: 98.1 (04-26 @ 05:37)  HR: 74 (60 - 74)  BP: 122/58 (105/58 - 152/72)  BP(mean): 84 (76 - 99)  RR: 18 (14 - 20)  SpO2: 100% (99% - 100%)    PHYSICAL EXAM:      Constitutional: A&Ox3    Respiratory: non labored breathing, no respiratory distress    Cardiovascular: NSR, RRR    Gastrointestinal: soft, non distended, no ttp                 Incision:n/a    Genitourinary: voiding     Extremities: (-) edema            I&O's Detail    I & Os for current day (as of 26 Apr 2017 07:36)  =============================================  IN:    lactated ringers.: 560 ml    Total IN: 560 ml  ---------------------------------------------  OUT:    Ureteral Catheter: 1950 ml    Voided: 1650 ml    Total OUT: 3600 ml  ---------------------------------------------  Total NET: -3040 ml      LABS:                        8.0    6.6   )-----------( 372      ( 26 Apr 2017 06:14 )             24.4     04-26    143  |  111<H>  |  7   ----------------------------<  104<H>  3.9   |  25  |  0.63    Ca    7.4<L>      26 Apr 2017 06:15  Phos  2.2     04-26  Mg     1.8     04-26    TPro  8.4<H>  /  Alb  3.2<L>  /  TBili  0.7  /  DBili  x   /  AST  33  /  ALT  25  /  AlkPhos  41  04-24    PT/INR - ( 25 Apr 2017 13:16 )   PT: 12.6 sec;   INR: 1.13          PTT - ( 25 Apr 2017 13:16 )  PTT:31.0 sec      RADIOLOGY & ADDITIONAL STUDIES:

## 2017-04-26 NOTE — PROGRESS NOTE ADULT - PROBLEM SELECTOR PLAN 4
- Patient with history of MM s/p treatment several years ago now with relapse, myeloma labs checked. Will need to discuss treatment as an outpatient with Dr. Parks

## 2017-04-26 NOTE — PROGRESS NOTE ADULT - ASSESSMENT
81M pt with BRBPR    -CLD  -IVF  -Strict I/O, if perfusion status unclear, place díaz  -CBC q6h  -Lower extremity Doppler to assess for DVT, as per Dr. Parks, will reconsider anticoagulation in the future, if deemed necessary once outweighing risks and benefits  -Home eyedrops for Glaucoma

## 2017-04-26 NOTE — PROGRESS NOTE ADULT - ASSESSMENT
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

## 2017-04-27 LAB
ANION GAP SERPL CALC-SCNC: 6 MMOL/L — LOW (ref 9–16)
BUN SERPL-MCNC: 5 MG/DL — LOW (ref 7–23)
CALCIUM SERPL-MCNC: 7.6 MG/DL — LOW (ref 8.5–10.5)
CHLORIDE SERPL-SCNC: 110 MMOL/L — HIGH (ref 96–108)
CO2 SERPL-SCNC: 24 MMOL/L — SIGNIFICANT CHANGE UP (ref 22–31)
CREAT SERPL-MCNC: 0.68 MG/DL — SIGNIFICANT CHANGE UP (ref 0.5–1.3)
GLUCOSE SERPL-MCNC: 128 MG/DL — HIGH (ref 70–99)
HCT VFR BLD CALC: 26.5 % — LOW (ref 39–50)
HGB BLD-MCNC: 9 G/DL — LOW (ref 13–17)
IGA FLD-MCNC: 13 MG/DL — LOW (ref 68–378)
IGG FLD-MCNC: 2180 MG/DL — HIGH (ref 694–1618)
IGM SERPL-MCNC: <4 MG/DL — LOW (ref 40–230)
KAPPA LC SER QL IFE: 8.38 MG/DL — HIGH (ref 0.33–1.94)
KAPPA LC SER QL IFE: 8.38 MG/DL — HIGH (ref 0.33–1.94)
KAPPA/LAMBDA FREE LIGHT CHAIN RATIO, SERUM: 5.74 RATIO — HIGH (ref 0.26–1.65)
KAPPA/LAMBDA FREE LIGHT CHAIN RATIO, SERUM: 5.74 RATIO — HIGH (ref 0.26–1.65)
LAMBDA LC SER QL IFE: 1.46 MG/DL — SIGNIFICANT CHANGE UP (ref 0.57–2.63)
LAMBDA LC SER QL IFE: 1.46 MG/DL — SIGNIFICANT CHANGE UP (ref 0.57–2.63)
MAGNESIUM SERPL-MCNC: 1.5 MG/DL — LOW (ref 1.6–2.4)
MCHC RBC-ENTMCNC: 30.4 PG — SIGNIFICANT CHANGE UP (ref 27–34)
MCHC RBC-ENTMCNC: 34 G/DL — SIGNIFICANT CHANGE UP (ref 32–36)
MCV RBC AUTO: 89.5 FL — SIGNIFICANT CHANGE UP (ref 80–100)
PHOSPHATE SERPL-MCNC: 2.2 MG/DL — LOW (ref 2.5–4.5)
PLATELET # BLD AUTO: 464 K/UL — HIGH (ref 150–400)
POTASSIUM SERPL-MCNC: 3.6 MMOL/L — SIGNIFICANT CHANGE UP (ref 3.5–5.3)
POTASSIUM SERPL-SCNC: 3.6 MMOL/L — SIGNIFICANT CHANGE UP (ref 3.5–5.3)
RBC # BLD: 2.96 M/UL — LOW (ref 4.2–5.8)
RBC # FLD: 15.8 % — SIGNIFICANT CHANGE UP (ref 10.3–16.9)
SODIUM SERPL-SCNC: 140 MMOL/L — SIGNIFICANT CHANGE UP (ref 135–145)
WBC # BLD: 6.1 K/UL — SIGNIFICANT CHANGE UP (ref 3.8–10.5)
WBC # FLD AUTO: 6.1 K/UL — SIGNIFICANT CHANGE UP (ref 3.8–10.5)

## 2017-04-27 PROCEDURE — 99231 SBSQ HOSP IP/OBS SF/LOW 25: CPT | Mod: GC

## 2017-04-27 RX ORDER — POTASSIUM PHOSPHATE, MONOBASIC POTASSIUM PHOSPHATE, DIBASIC 236; 224 MG/ML; MG/ML
15 INJECTION, SOLUTION INTRAVENOUS ONCE
Qty: 0 | Refills: 0 | Status: COMPLETED | OUTPATIENT
Start: 2017-04-27 | End: 2017-04-27

## 2017-04-27 RX ORDER — MAGNESIUM SULFATE 500 MG/ML
2 VIAL (ML) INJECTION ONCE
Qty: 0 | Refills: 0 | Status: COMPLETED | OUTPATIENT
Start: 2017-04-27 | End: 2017-04-27

## 2017-04-27 RX ADMIN — TAMSULOSIN HYDROCHLORIDE 0.4 MILLIGRAM(S): 0.4 CAPSULE ORAL at 21:47

## 2017-04-27 RX ADMIN — POTASSIUM PHOSPHATE, MONOBASIC POTASSIUM PHOSPHATE, DIBASIC 62.5 MILLIMOLE(S): 236; 224 INJECTION, SOLUTION INTRAVENOUS at 12:38

## 2017-04-27 RX ADMIN — BRIMONIDINE TARTRATE 1 DROP(S): 2 SOLUTION/ DROPS OPHTHALMIC at 11:06

## 2017-04-27 RX ADMIN — Medication 50 GRAM(S): at 11:00

## 2017-04-27 NOTE — PHYSICAL THERAPY INITIAL EVALUATION ADULT - ACTIVE RANGE OF MOTION EXAMINATION, REHAB EVAL
bilateral  lower extremity Active ROM was WFL (within functional limits)/bilateral shoulder flex 90 degrees/bilateral upper extremity Active ROM was WFL (within functional limits)

## 2017-04-27 NOTE — PHYSICAL THERAPY INITIAL EVALUATION ADULT - ADDITIONAL COMMENTS
Pt lives at home with spouse, elevator access, no JOLIE. Prior to admission: independent with functional mobility, amb with [facility] and states spouse always with him. Rec'd HPT 2x /wk prior to admission. [place].

## 2017-04-27 NOTE — PROGRESS NOTE ADULT - ASSESSMENT
81M pt w/PMH Prostate Ca, Multiple myeloma, essential thrombocythemia, recurrent DVT of unspecified lower extremity (on AC) presents w/BRBPR, Pt found to have supratherapeutic INR.and subsequent large volume bloody BM and dropped hgb from 12 -->9. EGD unremarkable for active bleeding up D2. Colonoscopy showing multiple diverticuli with old blood up to terminal ileum without evidence of active bleeding. VCE today.    1. BRBPR (likely 2/2 to diverticulosis with supratherapeutic INR)  -VCE today   -Monitor CBC, transfuse to hgb >7  -Follow up heme onc recs    GI following

## 2017-04-27 NOTE — PROGRESS NOTE ADULT - SUBJECTIVE AND OBJECTIVE BOX
O/N: DVT negative. No bloody BMs.   4/26: AM Hb 8.0 (9.0) repeat 8.0; tolerating CLD; NPO @ [facility] for capsule study; underwent US O/N: DVT negative. No bloody BMs.   4/26: AM Hb 8.0 (9.0) repeat 8.0; tolerating CLD; NPO @ [facility] for capsule study; underwent US      SUBJECTIVE:  Flatus: [ ] YES [x ] NO             Bowel Movement: [ ] YES [x ] NO  Pain (0-10):            Pain Control Adequate: [x ] YES [ ] NO  Nausea: [ ] YES [ x] NO            Vomiting: [ ] YES [ x] NO  Diarrhea: [ ] YES [x ] NO         Constipation: [ ] YES [x ] NO     Chest Pain: [ ] YES [ x] NO    SOB:  [ ] YES [ ] NO    MEDICATIONS  (STANDING):  lactated ringers. 1000milliLiter(s) IV Continuous <Continuous>  latanoprost 0.005% Ophthalmic Solution 1Drop(s) Both EYES at bedtime  brimonidine 0.2% Ophthalmic Solution 1Drop(s) Both EYES two times a day  tamsulosin 0.4milliGRAM(s) Oral at bedtime  potassium acid phosphate/sodium acid phosphate tablet (K-PHOS No. 2) 2Tablet(s) Oral once  magnesium sulfate  IVPB 2Gram(s) IV Intermittent once  potassium phosphate IVPB 15milliMole(s) IV Intermittent once    MEDICATIONS  (PRN):      Vital Signs Last 24 Hrs  T(C): 37.4, Max: 37.4 (04-27 @ 05:25)  T(F): 99.4, Max: 99.4 (04-27 @ 05:25)  HR: 68 (68 - 90)  BP: 144/75 (108/57 - 144/75)  BP(mean): 104 (79 - 116)  RR: 18 (16 - 27)  SpO2: 99% (93% - 100%)    PHYSICAL EXAM:      Constitutional: A&Ox3      Respiratory: non labored breathing, no respiratory distress    Cardiovascular: NSR, RRR    Gastrointestinal: soft, non distended, no ttp                 Incision:    Genitourinary: díaz to gravity    Extremities: (-) edema          I&O's Detail    I & Os for current day (as of 27 Apr 2017 07:41)  =============================================  IN:    lactated ringers.: 480 ml    Total IN: 480 ml  ---------------------------------------------  OUT:    Ureteral Catheter: 2900 ml    Total OUT: 2900 ml  ---------------------------------------------  Total NET: -2420 ml      LABS:                        9.0    6.1   )-----------( 464      ( 27 Apr 2017 06:16 )             26.5     04-27    140  |  110<H>  |  5<L>  ----------------------------<  128<H>  3.6   |  24  |  0.68    Ca    7.6<L>      27 Apr 2017 06:16  Phos  2.2     04-27  Mg     1.5     04-27      PT/INR - ( 25 Apr 2017 13:16 )   PT: 12.6 sec;   INR: 1.13          PTT - ( 25 Apr 2017 13:16 )  PTT:31.0 sec      RADIOLOGY & ADDITIONAL STUDIES:

## 2017-04-27 NOTE — PROGRESS NOTE ADULT - SUBJECTIVE AND OBJECTIVE BOX
Pt seen and examined at bedside. No acute overnight events. Pt denies any further episodes of BRBPR.     REVIEW OF SYSTEMS:  Constitutional: No fever, weight loss or fatigue  Cardiovascular: No chest pain, palpitations, dizziness or leg swelling  Gastrointestinal: No abdominal or epigastric pain. No nausea, vomiting or hematemesis; No diarrhea or constipation. No melena or hematochezia.  Skin: No itching, burning, rashes or lesions       MEDICATIONS:  MEDICATIONS  (STANDING):  lactated ringers. 1000milliLiter(s) IV Continuous <Continuous>  latanoprost 0.005% Ophthalmic Solution 1Drop(s) Both EYES at bedtime  brimonidine 0.2% Ophthalmic Solution 1Drop(s) Both EYES two times a day  tamsulosin 0.4milliGRAM(s) Oral at bedtime  potassium acid phosphate/sodium acid phosphate tablet (K-PHOS No. 2) 2Tablet(s) Oral once  magnesium sulfate  IVPB 2Gram(s) IV Intermittent once  potassium phosphate IVPB 15milliMole(s) IV Intermittent once    MEDICATIONS  (PRN):      Allergies    No Known Allergies    Intolerances        Vital Signs Last 24 Hrs  T(C): 36.3, Max: 37.4 (04-27 @ 05:25)  T(F): 97.3, Max: 99.4 (04-27 @ 05:25)  HR: 78 (68 - 90)  BP: 111/70 (108/57 - 144/75)  BP(mean): 86 (79 - 116)  RR: 19 (16 - 27)  SpO2: 98% (93% - 100%)    I & Os for current day (as of 04-27 @ 10:09)  =============================================  IN: 480 ml / OUT: 2900 ml / NET: -2420 ml      PHYSICAL EXAM:    General: Well developed; well nourished; in no acute distress  HEENT: MMM, conjunctiva and sclera clear  Gastrointestinal: Soft non-tender non-distended; Normal bowel sounds;  LABS:      CBC Full  -  ( 27 Apr 2017 06:16 )  WBC Count : 6.1 K/uL  Hemoglobin : 9.0 g/dL  Hematocrit : 26.5 %  Platelet Count - Automated : 464 K/uL  Mean Cell Volume : 89.5 fL  Mean Cell Hemoglobin : 30.4 pg  Mean Cell Hemoglobin Concentration : 34.0 g/dL  Auto Neutrophil # : x  Auto Lymphocyte # : x  Auto Monocyte # : x  Auto Eosinophil # : x  Auto Basophil # : x  Auto Neutrophil % : x  Auto Lymphocyte % : x  Auto Monocyte % : x  Auto Eosinophil % : x  Auto Basophil % : x    04-27    140  |  110<H>  |  5<L>  ----------------------------<  128<H>  3.6   |  24  |  0.68    Ca    7.6<L>      27 Apr 2017 06:16  Phos  2.2     04-27  Mg     1.5     04-27      PT/INR - ( 25 Apr 2017 13:16 )   PT: 12.6 sec;   INR: 1.13          PTT - ( 25 Apr 2017 13:16 )  PTT:31.0 sec                  RADIOLOGY & ADDITIONAL STUDIES (The following images were personally reviewed):

## 2017-04-27 NOTE — PHYSICAL THERAPY INITIAL EVALUATION ADULT - PERTINENT HX OF CURRENT PROBLEM, REHAB EVAL
presents to [facility] ED w/3 episodes of BRBPR, started this morning at 8am, in ED patient had 6 episodes of BRBPR.

## 2017-04-27 NOTE — DIETITIAN INITIAL EVALUATION ADULT. - NS AS NUTRI INTERV MEALS SNACK
As medically feasible, recommend resuming Clear Liquids diet.  As tolerated, continue diet advancement to Regular, low fiber.

## 2017-04-27 NOTE — PROGRESS NOTE ADULT - ASSESSMENT
81M pt with BRBPR    -CLD, NPO p/mn  -IVF  -Strict I/O, if perfusion status unclear, place díaz  -CBC q6h  -Lower extremity Doppler to assess for DVT, as per Dr. Parks, will reconsider anticoagulation in the future, if deemed necessary once outweighing risks and benefits  -Home eyedrops for Glaucoma  - capsule study

## 2017-04-27 NOTE — PHYSICAL THERAPY INITIAL EVALUATION ADULT - GAIT DEVIATIONS NOTED, PT EVAL
decreased monica/decreased stride length/limited due to HR 130s-140s EZE spencer and Rn 9Nicole) aware/decreased step length

## 2017-04-27 NOTE — PHYSICAL THERAPY INITIAL EVALUATION ADULT - GENERAL OBSERVATIONS, REHAB EVAL
Rec'd pt seated in bedside chair, non-acute distress, pt for capsule study as per Rn(jorge), cleared for PT and OOB activity by RN (jorge), denies pain.

## 2017-04-27 NOTE — DIETITIAN INITIAL EVALUATION ADULT. - OTHER INFO
Pt admitted with LGIB.  Pt remains NPO for test at time of assessment.  Pt previously tolerating clears well.  Pt denies GI distress or pain.  NKFA.  Skin: vijaya  16.

## 2017-04-28 ENCOUNTER — TRANSCRIPTION ENCOUNTER (OUTPATIENT)
Age: 82
End: 2017-04-28

## 2017-04-28 VITALS — TEMPERATURE: 98 F

## 2017-04-28 LAB
ANION GAP SERPL CALC-SCNC: 6 MMOL/L — LOW (ref 9–16)
BUN SERPL-MCNC: 6 MG/DL — LOW (ref 7–23)
CALCIUM SERPL-MCNC: 7.5 MG/DL — LOW (ref 8.5–10.5)
CHLORIDE SERPL-SCNC: 109 MMOL/L — HIGH (ref 96–108)
CO2 SERPL-SCNC: 24 MMOL/L — SIGNIFICANT CHANGE UP (ref 22–31)
CREAT SERPL-MCNC: 0.68 MG/DL — SIGNIFICANT CHANGE UP (ref 0.5–1.3)
GLUCOSE SERPL-MCNC: 103 MG/DL — HIGH (ref 70–99)
HCT VFR BLD CALC: 27.1 % — LOW (ref 39–50)
HGB BLD-MCNC: 9 G/DL — LOW (ref 13–17)
INTERPRETATION SERPL IFE-IMP: SIGNIFICANT CHANGE UP
MAGNESIUM SERPL-MCNC: 2.1 MG/DL — SIGNIFICANT CHANGE UP (ref 1.6–2.4)
MCHC RBC-ENTMCNC: 30 PG — SIGNIFICANT CHANGE UP (ref 27–34)
MCHC RBC-ENTMCNC: 33.2 G/DL — SIGNIFICANT CHANGE UP (ref 32–36)
MCV RBC AUTO: 90.3 FL — SIGNIFICANT CHANGE UP (ref 80–100)
PHOSPHATE SERPL-MCNC: 2.6 MG/DL — SIGNIFICANT CHANGE UP (ref 2.5–4.5)
PLATELET # BLD AUTO: 435 K/UL — HIGH (ref 150–400)
POTASSIUM SERPL-MCNC: 4 MMOL/L — SIGNIFICANT CHANGE UP (ref 3.5–5.3)
POTASSIUM SERPL-SCNC: 4 MMOL/L — SIGNIFICANT CHANGE UP (ref 3.5–5.3)
RBC # BLD: 3 M/UL — LOW (ref 4.2–5.8)
RBC # FLD: 16.1 % — SIGNIFICANT CHANGE UP (ref 10.3–16.9)
SODIUM SERPL-SCNC: 139 MMOL/L — SIGNIFICANT CHANGE UP (ref 135–145)
WBC # BLD: 5.4 K/UL — SIGNIFICANT CHANGE UP (ref 3.8–10.5)
WBC # FLD AUTO: 5.4 K/UL — SIGNIFICANT CHANGE UP (ref 3.8–10.5)

## 2017-04-28 PROCEDURE — 86334 IMMUNOFIX E-PHORESIS SERUM: CPT

## 2017-04-28 PROCEDURE — 85730 THROMBOPLASTIN TIME PARTIAL: CPT

## 2017-04-28 PROCEDURE — 99285 EMERGENCY DEPT VISIT HI MDM: CPT | Mod: 25

## 2017-04-28 PROCEDURE — 93005 ELECTROCARDIOGRAM TRACING: CPT

## 2017-04-28 PROCEDURE — 96375 TX/PRO/DX INJ NEW DRUG ADDON: CPT

## 2017-04-28 PROCEDURE — 86900 BLOOD TYPING SEROLOGIC ABO: CPT

## 2017-04-28 PROCEDURE — 99231 SBSQ HOSP IP/OBS SF/LOW 25: CPT | Mod: GC

## 2017-04-28 PROCEDURE — 86901 BLOOD TYPING SEROLOGIC RH(D): CPT

## 2017-04-28 PROCEDURE — 91110 GI TRC IMG INTRAL ESOPH-ILE: CPT

## 2017-04-28 PROCEDURE — 36415 COLL VENOUS BLD VENIPUNCTURE: CPT

## 2017-04-28 PROCEDURE — 83735 ASSAY OF MAGNESIUM: CPT

## 2017-04-28 PROCEDURE — 85025 COMPLETE CBC W/AUTO DIFF WBC: CPT

## 2017-04-28 PROCEDURE — 83521 IG LIGHT CHAINS FREE EACH: CPT

## 2017-04-28 PROCEDURE — 85610 PROTHROMBIN TIME: CPT

## 2017-04-28 PROCEDURE — 80048 BASIC METABOLIC PNL TOTAL CA: CPT

## 2017-04-28 PROCEDURE — 80053 COMPREHEN METABOLIC PANEL: CPT

## 2017-04-28 PROCEDURE — 86923 COMPATIBILITY TEST ELECTRIC: CPT

## 2017-04-28 PROCEDURE — 85027 COMPLETE CBC AUTOMATED: CPT

## 2017-04-28 PROCEDURE — 84100 ASSAY OF PHOSPHORUS: CPT

## 2017-04-28 PROCEDURE — 96374 THER/PROPH/DIAG INJ IV PUSH: CPT

## 2017-04-28 PROCEDURE — 93970 EXTREMITY STUDY: CPT

## 2017-04-28 PROCEDURE — C9132: CPT

## 2017-04-28 PROCEDURE — 86850 RBC ANTIBODY SCREEN: CPT

## 2017-04-28 PROCEDURE — 82784 ASSAY IGA/IGD/IGG/IGM EACH: CPT

## 2017-04-28 PROCEDURE — 97161 PT EVAL LOW COMPLEX 20 MIN: CPT

## 2017-04-28 RX ORDER — POTASSIUM PHOSPHATE, MONOBASIC POTASSIUM PHOSPHATE, DIBASIC 236; 224 MG/ML; MG/ML
15 INJECTION, SOLUTION INTRAVENOUS ONCE
Qty: 0 | Refills: 0 | Status: COMPLETED | OUTPATIENT
Start: 2017-04-28 | End: 2017-04-28

## 2017-04-28 RX ORDER — ASPIRIN/CALCIUM CARB/MAGNESIUM 324 MG
1 TABLET ORAL
Qty: 30 | Refills: 0
Start: 2017-04-28 | End: 2017-05-28

## 2017-04-28 RX ADMIN — BRIMONIDINE TARTRATE 1 DROP(S): 2 SOLUTION/ DROPS OPHTHALMIC at 09:44

## 2017-04-28 RX ADMIN — POTASSIUM PHOSPHATE, MONOBASIC POTASSIUM PHOSPHATE, DIBASIC 62.5 MILLIMOLE(S): 236; 224 INJECTION, SOLUTION INTRAVENOUS at 09:44

## 2017-04-28 RX ADMIN — LATANOPROST 1 DROP(S): 0.05 SOLUTION/ DROPS OPHTHALMIC; TOPICAL at 23:00

## 2017-04-28 RX ADMIN — BRIMONIDINE TARTRATE 1 DROP(S): 2 SOLUTION/ DROPS OPHTHALMIC at 23:00

## 2017-04-28 NOTE — DISCHARGE NOTE ADULT - CARE PROVIDERS DIRECT ADDRESSES
,DirectAddress_Unknown,[email or web address],DirectAddress_Unknown

## 2017-04-28 NOTE — DISCHARGE NOTE ADULT - CARE PLAN
Principal Discharge DX:	Gastrointestinal hemorrhage, unspecified gastrointestinal hemorrhage type  Goal:	tolerate diet  Instructions for follow-up, activity and diet:	Continue a regular diet and activity as tolerated.  Hold Coumadin. You will continue aspirin 81mg daily.  Follow up with Dr. Parks in 1-2 weeks.   Follow up with Dr. Merrill (gastroenterologist) in 1 week for the results for capsule.   Follow up with Dr. Hernandez in 1-2 weeks.   Follow up with Dr. Parks in 1-2 weeks. Principal Discharge DX:	Gastrointestinal hemorrhage, unspecified gastrointestinal hemorrhage type  Goal:	tolerate diet  Instructions for follow-up, activity and diet:	Continue a regular diet and activity as tolerated.  Hold Coumadin. You will continue aspirin 81mg daily.  Follow up with Dr. Parks in 1-2 weeks.   Follow up with Dr. Merrill (gastroenterologist) in 1 week for the results for capsule.   Follow up with Dr. Hernandez in 1-2 weeks.   Follow up with Dr. Parks in 1-2 weeks.  Return to the ED for any worsening bloody BMs, abdominal pain, fever>101F/chills, nausea/vomiting, shortness of breath, or chest pain.

## 2017-04-28 NOTE — PROGRESS NOTE ADULT - SUBJECTIVE AND OBJECTIVE BOX
O/N: passed TOV. PRUDENCIO.   4/27: capsule study done; advanced diet; arjun maher SUBJECTIVE: Patient seen and examined bedside by chief resident.  O/N: passed TOV. PRUDENCIO. denies any more bloody BMs  4/27: capsule study done; advanced diet; arjun maher     tamsulosin 0.4milliGRAM(s) Oral at bedtime      Vital Signs Last 24 Hrs  T(C): 36.3, Max: 36.9 (04-27 @ 22:00)  T(F): 97.4, Max: 98.4 (04-27 @ 22:00)  HR: 78 (78 - 90)  BP: 129/74 (102/55 - 129/74)  BP(mean): 93 (71 - 96)  RR: 16 (14 - 21)  SpO2: 93% (92% - 99%)  I&O's Detail    I & Os for current day (as of 28 Apr 2017 07:38)  =============================================  IN:    Total IN: 0 ml  ---------------------------------------------  OUT:    Voided: 890 ml    Ureteral Catheter: 300 ml    Total OUT: 1190 ml  ---------------------------------------------  Total NET: -1190 ml      General: NAD, resting comfortably in bed  C/V: NSR  Pulm: Nonlabored breathing, no respiratory distress  Abd: soft, NT/ND.  Extrem: WWP, no edema, SCDs in place        LABS:                        9.0    5.4   )-----------( 435      ( 28 Apr 2017 06:30 )             27.1     04-28    139  |  109<H>  |  6<L>  ----------------------------<  103<H>  4.0   |  24  |  0.68    Ca    7.5<L>      28 Apr 2017 06:30  Phos  2.6     04-28  Mg     2.1     04-28            RADIOLOGY & ADDITIONAL STUDIES:

## 2017-04-28 NOTE — PROGRESS NOTE ADULT - ASSESSMENT
81M pt w/PMH Prostate Ca, Multiple myeloma, essential thrombocythemia, recurrent DVT of unspecified lower extremity (on AC) presents w/BRBPR, Pt found to have supratherapeutic INR.and subsequent large volume bloody BM and dropped hgb from 12 -->9. EGD unremarkable for active bleeding up D2. Colonoscopy showing multiple diverticuli with old blood up to terminal ileum without evidence of active bleeding. VCE without evidence of bleeding source, although due to prolonged time in stomach unable to visualize distal small bowel.      1. BRBPR (likely 2/2 to diverticulosis with supratherapeutic INR)  -VCE appreciated, can follow up with outpatient GI doctors for repeat VCE.   -Monitor CBC, transfuse to hgb >7  -Follow up heme onc recs  -Pt to be discharged today.    GI will sign off at this time, please reconsult as needed.

## 2017-04-28 NOTE — DISCHARGE NOTE ADULT - CARE PROVIDER_API CALL
Delmar Merrill), Gastroenterology; Internal Medicine  [address] [address]  Phone: [phone]  Fax: [phone]    Frandy Hernandez), Surgery  [address] [address]  Phone: [phone]  Fax: [phone]    Shahriar Parks), Hematology; Internal Medicine; Medical Oncology  [address] [address]  Phone: [phone]  Fax: [phone]

## 2017-04-28 NOTE — PROGRESS NOTE ADULT - SUBJECTIVE AND OBJECTIVE BOX
Pt seen and examined at bedside. No further episodes of bleeding.     REVIEW OF SYSTEMS:  Constitutional: No fever, weight loss or fatigue  Cardiovascular: No chest pain, palpitations, dizziness or leg swelling  Gastrointestinal: No abdominal or epigastric pain. No nausea, vomiting or hematemesis; No diarrhea or constipation. No melena or hematochezia.  Skin: No itching, burning, rashes or lesions       MEDICATIONS:  MEDICATIONS  (STANDING):  latanoprost 0.005% Ophthalmic Solution 1Drop(s) Both EYES at bedtime  brimonidine 0.2% Ophthalmic Solution 1Drop(s) Both EYES two times a day  tamsulosin 0.4milliGRAM(s) Oral at bedtime    MEDICATIONS  (PRN):      Allergies    No Known Allergies    Intolerances        Vital Signs Last 24 Hrs  T(C): 36.1, Max: 36.9 (04-27 @ 22:00)  T(F): 97, Max: 98.4 (04-27 @ 22:00)  HR: 88 (78 - 90)  BP: 119/58 (102/55 - 137/60)  BP(mean): 81 (71 - 96)  RR: 16 (14 - 18)  SpO2: 95% (87% - 96%)    I & Os for current day (as of 04-28 @ 13:45)  =============================================  IN: 0 ml / OUT: 1190 ml / NET: -1190 ml      PHYSICAL EXAM:    General: Well developed; well nourished; in no acute distress  HEENT: MMM, conjunctiva and sclera clear  Gastrointestinal: Soft non-tender non-distended; Normal bowel sounds; No hepatosplenomegaly  Skin: Warm and dry. No obvious rash    LABS:      CBC Full  -  ( 28 Apr 2017 06:30 )  WBC Count : 5.4 K/uL  Hemoglobin : 9.0 g/dL  Hematocrit : 27.1 %  Platelet Count - Automated : 435 K/uL  Mean Cell Volume : 90.3 fL  Mean Cell Hemoglobin : 30.0 pg  Mean Cell Hemoglobin Concentration : 33.2 g/dL  Auto Neutrophil # : x  Auto Lymphocyte # : x  Auto Monocyte # : x  Auto Eosinophil # : x  Auto Basophil # : x  Auto Neutrophil % : x  Auto Lymphocyte % : x  Auto Monocyte % : x  Auto Eosinophil % : x  Auto Basophil % : x    04-28    139  |  109<H>  |  6<L>  ----------------------------<  103<H>  4.0   |  24  |  0.68    Ca    7.5<L>      28 Apr 2017 06:30  Phos  2.6     04-28  Mg     2.1     04-28                        RADIOLOGY & ADDITIONAL STUDIES (The following images were personally reviewed):

## 2017-04-28 NOTE — PROGRESS NOTE ADULT - ATTENDING COMMENTS
Patient seen and examined. Doing well. No further clinical evidence of bleeding. Will need follow up as outpatient for capsule study results, and if negative may consider restarting anticoagulation (Dr. Parks to see and decide). I answered all questions.

## 2017-04-28 NOTE — DISCHARGE NOTE ADULT - HOSPITAL COURSE
81M pt w/PMH Prostate Ca, Multiple myeloma, essential thrombocythemia, recurrent DVT of unspecified lower extremity (on coumadin as per Dr. Parks for recurrent DVT and 2 malignancies), BPH, and glaucoma, presents to [facility] ED w/3 episodes of BRBPR, started this morning at 8am, in ED patient had 6 episodes of BRBPR. Denies any previous episode. Had a colonoscopy about 4 years ago, 1 benign polyp excised. In ED patient is HDS, serial H/H stable,(Hgb persistently 12's), INR supratherapeutic at 3.85 given 10mg of Vit K. Pt underwent a colonoscopy and endoscopy that did not reveal an active bleed. Pt underwent a capsule study and will follow up with GI as an outpt. H/H remained stable with no further bloody BMs. Pt will not be discharged on coumadin and will follow up with heme as an outpt. Rest of hospitalization remained uneventful. At the time of discharge, pt's vital signs are stable and labs all WNL.

## 2017-04-28 NOTE — DISCHARGE NOTE ADULT - MEDICATION SUMMARY - MEDICATIONS TO STOP TAKING
I will STOP taking the medications listed below when I get home from the hospital:    Coumadin 7.5 mg oral tablet  -- 1 tab(s) by mouth once a day    Levaquin 750 mg oral tablet  -- 1 tab(s) by mouth every 24 hours  -- Avoid prolonged or excessive exposure to direct and/or artificial sunlight while taking this medication.  Do not take dairy products, antacids, or iron preparations within one hour of this medication.  Finish all this medication unless otherwise directed by prescriber.  May cause drowsiness or dizziness.  Medication should be taken with plenty of water.

## 2017-04-28 NOTE — DISCHARGE NOTE ADULT - MEDICATION SUMMARY - MEDICATIONS TO TAKE
I will START or STAY ON the medications listed below when I get home from the hospital:    aspirin 81 mg oral delayed release tablet  -- 1 tab(s) by mouth once a day  -- Swallow whole.  Do not crush.  Take with food or milk.    -- Indication: For PPx    tamsulosin 0.4 mg oral capsule  -- 1 cap(s) by mouth once a day  -- Indication: For BPH    hydroxyurea 500 mg oral capsule  --  by mouth BID  -- Indication: For home med    latanoprost ophthalmic  --  to each affected eye   -- Indication: For Glaucoma    Alphagan  --  to each affected eye   -- Indication: For Glaucom    Vitamin B Complex 100  --  injectable   -- Indication: For home med

## 2017-04-28 NOTE — PROGRESS NOTE ADULT - ASSESSMENT
81M pt with BRBPR    - Regular diet  - capsule study  - restart AC on discharge  - f/u GI recs 81M pt with BRBPR    - Regular diet  - f/u capsule study results  - restart AC on discharge  - f/u GI recs  - Discharge home today

## 2017-04-28 NOTE — DISCHARGE NOTE ADULT - PLAN OF CARE
Continue a regular diet and activity as tolerated.  Hold Coumadin. You will continue aspirin 81mg daily.  Follow up with Dr. Parks in 1-2 weeks.   Follow up with Dr. Merrill (gastroenterologist) in 1 week for the results for capsule.   Follow up with Dr. Hernandez in 1-2 weeks.   Follow up with Dr. Parks in 1-2 weeks. tolerate diet Continue a regular diet and activity as tolerated.  Hold Coumadin. You will continue aspirin 81mg daily.  Follow up with Dr. Parks in 1-2 weeks.   Follow up with Dr. Merrill (gastroenterologist) in 1 week for the results for capsule.   Follow up with Dr. Hernandez in 1-2 weeks.   Follow up with Dr. Parks in 1-2 weeks.  Return to the ED for any worsening bloody BMs, abdominal pain, fever>101F/chills, nausea/vomiting, shortness of breath, or chest pain.

## 2017-04-28 NOTE — DISCHARGE NOTE ADULT - PATIENT PORTAL LINK FT
“You can access the [employer] Patient Portal, offered by [facility], by registering with the following website: [email or web address]”

## 2017-05-02 DIAGNOSIS — Z86.73 PERSONAL HISTORY OF TRANSIENT ISCHEMIC ATTACK (TIA), AND CEREBRAL INFARCTION WITHOUT RESIDUAL DEFICITS: ICD-10-CM

## 2017-05-02 DIAGNOSIS — D12.0 BENIGN NEOPLASM OF CECUM: ICD-10-CM

## 2017-05-02 DIAGNOSIS — N40.0 BENIGN PROSTATIC HYPERPLASIA WITHOUT LOWER URINARY TRACT SYMPTOMS: ICD-10-CM

## 2017-05-02 DIAGNOSIS — H40.9 UNSPECIFIED GLAUCOMA: ICD-10-CM

## 2017-05-02 DIAGNOSIS — K44.9 DIAPHRAGMATIC HERNIA WITHOUT OBSTRUCTION OR GANGRENE: ICD-10-CM

## 2017-05-02 DIAGNOSIS — K57.31 DIVERTICULOSIS OF LARGE INTESTINE WITHOUT PERFORATION OR ABSCESS WITH BLEEDING: ICD-10-CM

## 2017-05-02 DIAGNOSIS — K92.1 MELENA: ICD-10-CM

## 2017-05-02 DIAGNOSIS — D68.59 OTHER PRIMARY THROMBOPHILIA: ICD-10-CM

## 2017-05-02 DIAGNOSIS — K29.80 DUODENITIS WITHOUT BLEEDING: ICD-10-CM

## 2017-05-02 DIAGNOSIS — D69.3 IMMUNE THROMBOCYTOPENIC PURPURA: ICD-10-CM

## 2017-05-02 DIAGNOSIS — K26.9 DUODENAL ULCER, UNSPECIFIED AS ACUTE OR CHRONIC, WITHOUT HEMORRHAGE OR PERFORATION: ICD-10-CM

## 2017-05-02 DIAGNOSIS — Z79.82 LONG TERM (CURRENT) USE OF ASPIRIN: ICD-10-CM

## 2017-05-02 DIAGNOSIS — R55 SYNCOPE AND COLLAPSE: ICD-10-CM

## 2017-05-02 DIAGNOSIS — Z86.718 PERSONAL HISTORY OF OTHER VENOUS THROMBOSIS AND EMBOLISM: ICD-10-CM

## 2017-05-02 DIAGNOSIS — K63.5 POLYP OF COLON: ICD-10-CM

## 2017-05-02 DIAGNOSIS — Z79.01 LONG TERM (CURRENT) USE OF ANTICOAGULANTS: ICD-10-CM

## 2017-05-02 DIAGNOSIS — K29.60 OTHER GASTRITIS WITHOUT BLEEDING: ICD-10-CM

## 2017-05-02 DIAGNOSIS — C61 MALIGNANT NEOPLASM OF PROSTATE: ICD-10-CM

## 2017-05-02 DIAGNOSIS — C90.02 MULTIPLE MYELOMA IN RELAPSE: ICD-10-CM

## 2017-09-22 ENCOUNTER — APPOINTMENT (OUTPATIENT)
Dept: UROLOGY | Facility: CLINIC | Age: 82
End: 2017-09-22
Payer: MEDICARE

## 2017-09-22 VITALS
SYSTOLIC BLOOD PRESSURE: 161 MMHG | TEMPERATURE: 97.9 F | HEART RATE: 63 BPM | WEIGHT: 145 LBS | DIASTOLIC BLOOD PRESSURE: 73 MMHG | BODY MASS INDEX: 22.76 KG/M2 | HEIGHT: 67 IN

## 2017-09-22 DIAGNOSIS — R35.0 FREQUENCY OF MICTURITION: ICD-10-CM

## 2017-09-22 PROCEDURE — 99204 OFFICE O/P NEW MOD 45 MIN: CPT | Mod: 25

## 2017-09-22 PROCEDURE — 51798 US URINE CAPACITY MEASURE: CPT

## 2017-09-22 RX ORDER — BRIMONIDINE TARTRATE 1 MG/ML
0.1 SOLUTION/ DROPS OPHTHALMIC
Qty: 10 | Refills: 0 | Status: ACTIVE | COMMUNITY
Start: 2017-02-14

## 2017-09-22 RX ORDER — ASPIRIN 81 MG/1
81 TABLET ORAL
Qty: 30 | Refills: 0 | Status: ACTIVE | COMMUNITY
Start: 2017-04-28

## 2017-09-22 RX ORDER — DUTASTERIDE 0.5 MG/1
0.5 CAPSULE, LIQUID FILLED ORAL
Qty: 30 | Refills: 0 | Status: ACTIVE | COMMUNITY
Start: 2017-02-27

## 2017-09-22 RX ORDER — DORZOLAMIDE HYDROCHLORIDE TIMOLOL MALEATE 20; 5 MG/ML; MG/ML
22.3-6.8 SOLUTION/ DROPS OPHTHALMIC
Qty: 10 | Refills: 0 | Status: ACTIVE | COMMUNITY
Start: 2017-02-14

## 2017-09-22 RX ORDER — WARFARIN 7.5 MG/1
7.5 TABLET ORAL
Qty: 90 | Refills: 0 | Status: ACTIVE | COMMUNITY
Start: 2016-08-22

## 2017-09-22 RX ORDER — LATANOPROST/PF 0.005 %
0.01 DROPS OPHTHALMIC (EYE)
Qty: 1 | Refills: 0 | Status: ACTIVE | COMMUNITY
Start: 2017-02-14

## 2017-09-23 LAB
APPEARANCE: CLEAR
BACTERIA: ABNORMAL
BILIRUBIN URINE: NEGATIVE
BLOOD URINE: NEGATIVE
COLOR: ABNORMAL
GLUCOSE QUALITATIVE U: NORMAL MG/DL
HYALINE CASTS: 6 /LPF
KETONES URINE: NEGATIVE
LEUKOCYTE ESTERASE URINE: ABNORMAL
MICROSCOPIC-UA: NORMAL
NITRITE URINE: POSITIVE
PH URINE: 7
PROTEIN URINE: 30 MG/DL
PSA FREE FLD-MCNC: NORMAL
PSA FREE SERPL-MCNC: <0.01 NG/ML
PSA SERPL-MCNC: <0.01 NG/ML
RED BLOOD CELLS URINE: 1 /HPF
SPECIFIC GRAVITY URINE: 1.02
SQUAMOUS EPITHELIAL CELLS: 0 /HPF
UROBILINOGEN URINE: NORMAL MG/DL
WHITE BLOOD CELLS URINE: 44 /HPF

## 2017-09-25 LAB — BACTERIA UR CULT: ABNORMAL

## 2017-10-05 ENCOUNTER — EMERGENCY (EMERGENCY)
Facility: HOSPITAL | Age: 82
LOS: 1 days | Discharge: HOME | End: 2017-10-05
Attending: EMERGENCY MEDICINE | Admitting: EMERGENCY MEDICINE
Payer: MEDICARE

## 2017-10-05 VITALS
TEMPERATURE: 97 F | WEIGHT: 166.89 LBS | OXYGEN SATURATION: 96 % | SYSTOLIC BLOOD PRESSURE: 164 MMHG | DIASTOLIC BLOOD PRESSURE: 64 MMHG | HEART RATE: 76 BPM | RESPIRATION RATE: 16 BRPM

## 2017-10-05 VITALS
SYSTOLIC BLOOD PRESSURE: 150 MMHG | DIASTOLIC BLOOD PRESSURE: 66 MMHG | HEART RATE: 70 BPM | OXYGEN SATURATION: 98 % | RESPIRATION RATE: 16 BRPM

## 2017-10-05 DIAGNOSIS — Z79.82 LONG TERM (CURRENT) USE OF ASPIRIN: ICD-10-CM

## 2017-10-05 DIAGNOSIS — R33.9 RETENTION OF URINE, UNSPECIFIED: ICD-10-CM

## 2017-10-05 DIAGNOSIS — Z98.89 OTHER SPECIFIED POSTPROCEDURAL STATES: Chronic | ICD-10-CM

## 2017-10-05 DIAGNOSIS — Z79.899 OTHER LONG TERM (CURRENT) DRUG THERAPY: ICD-10-CM

## 2017-10-05 LAB
ALBUMIN SERPL ELPH-MCNC: 3 G/DL — LOW (ref 3.3–5)
ALP SERPL-CCNC: 37 U/L — LOW (ref 40–120)
ALT FLD-CCNC: 12 U/L — SIGNIFICANT CHANGE UP (ref 10–45)
ANION GAP SERPL CALC-SCNC: 8 MMOL/L — SIGNIFICANT CHANGE UP (ref 5–17)
APPEARANCE UR: (no result)
AST SERPL-CCNC: 22 U/L — SIGNIFICANT CHANGE UP (ref 10–40)
BASOPHILS NFR BLD AUTO: 0.5 % — SIGNIFICANT CHANGE UP (ref 0–2)
BILIRUB SERPL-MCNC: 0.6 MG/DL — SIGNIFICANT CHANGE UP (ref 0.2–1.2)
BILIRUB UR-MCNC: (no result)
BUN SERPL-MCNC: 17 MG/DL — SIGNIFICANT CHANGE UP (ref 7–23)
CALCIUM SERPL-MCNC: 8.7 MG/DL — SIGNIFICANT CHANGE UP (ref 8.4–10.5)
CHLORIDE SERPL-SCNC: 105 MMOL/L — SIGNIFICANT CHANGE UP (ref 96–108)
CO2 SERPL-SCNC: 24 MMOL/L — SIGNIFICANT CHANGE UP (ref 22–31)
COLOR SPEC: (no result)
CREAT SERPL-MCNC: 0.85 MG/DL — SIGNIFICANT CHANGE UP (ref 0.5–1.3)
DIFF PNL FLD: (no result)
EOSINOPHIL NFR BLD AUTO: 1.2 % — SIGNIFICANT CHANGE UP (ref 0–6)
GLUCOSE SERPL-MCNC: 104 MG/DL — HIGH (ref 70–99)
GLUCOSE UR QL: NEGATIVE — SIGNIFICANT CHANGE UP
HCT VFR BLD CALC: 35.9 % — LOW (ref 39–50)
HGB BLD-MCNC: 12 G/DL — LOW (ref 13–17)
KETONES UR-MCNC: NEGATIVE — SIGNIFICANT CHANGE UP
LEUKOCYTE ESTERASE UR-ACNC: (no result)
LYMPHOCYTES # BLD AUTO: 36.1 % — SIGNIFICANT CHANGE UP (ref 13–44)
MCHC RBC-ENTMCNC: 27.9 PG — SIGNIFICANT CHANGE UP (ref 27–34)
MCHC RBC-ENTMCNC: 33.4 G/DL — SIGNIFICANT CHANGE UP (ref 32–36)
MCV RBC AUTO: 83.5 FL — SIGNIFICANT CHANGE UP (ref 80–100)
MONOCYTES NFR BLD AUTO: 12.5 % — SIGNIFICANT CHANGE UP (ref 2–14)
NEUTROPHILS NFR BLD AUTO: 49.7 % — SIGNIFICANT CHANGE UP (ref 43–77)
NITRITE UR-MCNC: POSITIVE
PH UR: 6.5 — SIGNIFICANT CHANGE UP (ref 5–8)
PLATELET # BLD AUTO: 431 K/UL — HIGH (ref 150–400)
POTASSIUM SERPL-MCNC: 4.9 MMOL/L — SIGNIFICANT CHANGE UP (ref 3.5–5.3)
POTASSIUM SERPL-SCNC: 4.9 MMOL/L — SIGNIFICANT CHANGE UP (ref 3.5–5.3)
PROT SERPL-MCNC: 8.4 G/DL — HIGH (ref 6–8.3)
PROT UR-MCNC: 100 MG/DL
RBC # BLD: 4.3 M/UL — SIGNIFICANT CHANGE UP (ref 4.2–5.8)
RBC # FLD: 20.9 % — HIGH (ref 10.3–16.9)
SODIUM SERPL-SCNC: 137 MMOL/L — SIGNIFICANT CHANGE UP (ref 135–145)
SP GR SPEC: 1.02 — SIGNIFICANT CHANGE UP (ref 1–1.03)
UROBILINOGEN FLD QL: 1 E.U./DL — SIGNIFICANT CHANGE UP
WBC # BLD: 4.2 K/UL — SIGNIFICANT CHANGE UP (ref 3.8–10.5)
WBC # FLD AUTO: 4.2 K/UL — SIGNIFICANT CHANGE UP (ref 3.8–10.5)

## 2017-10-05 PROCEDURE — 51702 INSERT TEMP BLADDER CATH: CPT

## 2017-10-05 PROCEDURE — 87086 URINE CULTURE/COLONY COUNT: CPT

## 2017-10-05 PROCEDURE — 85025 COMPLETE CBC W/AUTO DIFF WBC: CPT

## 2017-10-05 PROCEDURE — 99284 EMERGENCY DEPT VISIT MOD MDM: CPT

## 2017-10-05 PROCEDURE — 80053 COMPREHEN METABOLIC PANEL: CPT

## 2017-10-05 PROCEDURE — 81001 URINALYSIS AUTO W/SCOPE: CPT

## 2017-10-05 PROCEDURE — 99283 EMERGENCY DEPT VISIT LOW MDM: CPT | Mod: 25

## 2017-10-05 RX ORDER — SODIUM CHLORIDE 9 MG/ML
1000 INJECTION INTRAMUSCULAR; INTRAVENOUS; SUBCUTANEOUS ONCE
Qty: 0 | Refills: 0 | Status: COMPLETED | OUTPATIENT
Start: 2017-10-05 | End: 2017-10-05

## 2017-10-05 RX ORDER — NITROFURANTOIN MACROCRYSTAL 50 MG
1 CAPSULE ORAL
Qty: 10 | Refills: 0
Start: 2017-10-05 | End: 2017-10-10

## 2017-10-05 RX ADMIN — SODIUM CHLORIDE 1000 MILLILITER(S): 9 INJECTION INTRAMUSCULAR; INTRAVENOUS; SUBCUTANEOUS at 10:02

## 2017-10-05 NOTE — ED PROVIDER NOTE - OBJECTIVE STATEMENT
81 y/o m hx prostate CA, multiple myeloma presents stating has been unable to urinate since yesterday afternoon.  Pt stating tried to urinate during the night and passed a small amount of bloody urine, having lower abd pain today.  Denies fever, chills, n/v, all other ROS negative.

## 2017-10-05 NOTE — ED PROVIDER NOTE - ATTENDING CONTRIBUTION TO CARE
Pt is an 83yo m, h/o prostate ca, multiple myeloma, who p/w retention since last night. Pt noticed small amt of blood to urine last night and was unable to urinate since then. + bladder distention/ pain. No f/c, flank pain, n/v. No dysuria, freq. Not on ac. Afebrile. HDS. Thin, elderly m in nad. + s1, s2, rrr. Lungs cta b/l. Abd soft, nt/nd, no guarding/ rebound. Mullins catheter placed with drainage of dark colored urine, no clots. Pt feeling much better afterwards. Labs reviewed w/ findings of normal cr, wbc, stable hg/hct. UA + for nitrites and blood. Will tx for uti and dc home w/ leg bag. Pt to f/u with Dr. Carpio for re-evaluation.

## 2017-10-05 NOTE — ED PROVIDER NOTE - MEDICAL DECISION MAKING DETAILS
81 y/o m urinary retention; díaz placed and ~400cc output so far of dark urine maroon/brown with no clots, will send urine studies, check labs and reassess.

## 2017-10-05 NOTE — ED ADULT NURSE NOTE - OBJECTIVE STATEMENT
83 y/o male w/ hx of prostate ca (tx w/ seed implants and radiation) c/o urinary retention since last night. Patient reports he chronically has issues urinating but has not needed a catheter since last year. Patient on flomax with minimal relief. 14F díaz inserted, 200 cc tea colored urine put out.

## 2017-10-07 LAB
CULTURE RESULTS: NO GROWTH — SIGNIFICANT CHANGE UP
SPECIMEN SOURCE: SIGNIFICANT CHANGE UP

## 2017-10-13 ENCOUNTER — APPOINTMENT (OUTPATIENT)
Dept: UROLOGY | Facility: CLINIC | Age: 82
End: 2017-10-13
Payer: MEDICARE

## 2017-10-13 PROCEDURE — 99213 OFFICE O/P EST LOW 20 MIN: CPT

## 2017-11-15 ENCOUNTER — APPOINTMENT (OUTPATIENT)
Dept: UROLOGY | Facility: CLINIC | Age: 82
End: 2017-11-15
Payer: MEDICARE

## 2017-11-15 VITALS
SYSTOLIC BLOOD PRESSURE: 156 MMHG | HEIGHT: 67 IN | DIASTOLIC BLOOD PRESSURE: 86 MMHG | BODY MASS INDEX: 22.76 KG/M2 | HEART RATE: 59 BPM | WEIGHT: 145 LBS

## 2017-11-15 DIAGNOSIS — C61 MALIGNANT NEOPLASM OF PROSTATE: ICD-10-CM

## 2017-11-15 DIAGNOSIS — N39.0 URINARY TRACT INFECTION, SITE NOT SPECIFIED: ICD-10-CM

## 2017-11-15 PROCEDURE — 51798 US URINE CAPACITY MEASURE: CPT

## 2017-11-15 PROCEDURE — 99213 OFFICE O/P EST LOW 20 MIN: CPT | Mod: 25

## 2018-01-17 ENCOUNTER — APPOINTMENT (OUTPATIENT)
Dept: UROLOGY | Facility: CLINIC | Age: 83
End: 2018-01-17

## 2018-10-22 ENCOUNTER — APPOINTMENT (OUTPATIENT)
Dept: UROLOGY | Facility: CLINIC | Age: 83
End: 2018-10-22
Payer: MEDICARE

## 2018-10-22 VITALS — SYSTOLIC BLOOD PRESSURE: 172 MMHG | HEART RATE: 66 BPM | DIASTOLIC BLOOD PRESSURE: 78 MMHG | TEMPERATURE: 97.9 F

## 2018-10-22 DIAGNOSIS — R33.8 OTHER RETENTION OF URINE: ICD-10-CM

## 2018-10-22 DIAGNOSIS — Z85.46 PERSONAL HISTORY OF MALIGNANT NEOPLASM OF PROSTATE: ICD-10-CM

## 2018-10-22 PROCEDURE — 51798 US URINE CAPACITY MEASURE: CPT

## 2018-10-22 PROCEDURE — 99213 OFFICE O/P EST LOW 20 MIN: CPT | Mod: 25

## 2018-10-22 RX ORDER — CIPROFLOXACIN HYDROCHLORIDE 500 MG/1
500 TABLET, FILM COATED ORAL
Qty: 14 | Refills: 0 | Status: DISCONTINUED | COMMUNITY
Start: 2017-09-25 | End: 2018-10-22

## 2018-11-24 NOTE — PHYSICAL THERAPY INITIAL EVALUATION ADULT - LEVEL OF INDEPENDENCE: STAND/SIT, REHAB EVAL
Verified Results  COMP METABOLIC PANEL WITH CBCA (CPNL,CBCA) [id number] 09:21AM DERECK HUGGINS   [Jun 25, 2017 6:40PM DERECK HUGGINS]  Staff to call pt.     Test Name Result Flag Reference   WHITE BLOOD COUNT 5.6 K/mcL  4.2-11.0   RED CELL COUNT 5.30 mil/mcL  4.50-5.90   HEMOGLOBIN 14.8 g/dl  13.0-17.0   HEMATOCRIT 46.1 %  39.0-51.0   MEAN CORPUSCULAR VOLUME 87.0 fL  78.0-100.0   MEAN CORPUSCULAR HEMOGLOBIN 27.9 pg  26.0-34.0   MEAN CORPUSCULAR HGB CONC 32.1 g/dl  32.0-36.5   RDW-CV 14.6 %  11.0-15.0   PLATELET COUNT 179 K/mcL  140-450   DIFF TYPE      AUTOMATED DIFFERENTIAL   ANTONIO% 68 %     LYM% 17 %     MON% 10 %     EOS% 4 %     BASO% 1 %     ANTONIO ABS 3.8 K/mcL  1.8-7.7   LYM ABS 0.9 K/mcL L 1.0-4.0   MON ABS 0.6 K/mcL  0.3-0.9   EOS ABS 0.2 K/mcL  0.1-0.5   BASO ABS 0.1 K/mcL  0.0-0.3   FASTING STATUS 0 hrs     SODIUM 139 mmol/L  135-145   POTASSIUM 4.6 mmol/L  3.4-5.1   CHLORIDE 104 mmol/L     CARBON DIOXIDE 28 mmol/L  21-32   ANION GAP 12 mmol/L  10-20   GLUCOSE 96 mg/dl  65-99   BUN 19 mg/dl  6-20   CREATININE 1.04 mg/dl  0.67-1.17   GFR EST. AMER 80     eGFR 60 - 89 mL/min/1.73m2 = Mild decrease in kidney function.   GFR EST.NONAFRI AMER 69     eGFR 60 - 89 mL/min/1.73m2 = Mild decrease in kidney function.   BUN/CREATININE RATIO 18  7-25   CALCIUM 9.2 mg/dl  8.4-10.2   BILIRUBIN TOTAL 0.4 mg/dl  0.2-1.0   GOT/AST 18 Units/L  <38   GPT/ALT 28 Units/L  <79   ALKALINE PHOSPHATASE 75 Units/L     TOTAL PROTEIN 7.3 g/dl  6.4-8.2   ALBUMIN 3.9 g/dl  3.6-5.1   GLOBULIN (CALCULATED) 3.4 g/dl  2.0-4.0   A/G RATIO 1.1  1.0-2.4       
contact guard

## 2019-04-22 ENCOUNTER — APPOINTMENT (OUTPATIENT)
Dept: UROLOGY | Facility: CLINIC | Age: 84
End: 2019-04-22

## 2019-08-19 ENCOUNTER — INPATIENT (INPATIENT)
Facility: HOSPITAL | Age: 84
LOS: 8 days | Discharge: NURSING, REHAB OR LONG-TERM CARE | DRG: 871 | End: 2019-08-28
Attending: INTERNAL MEDICINE | Admitting: INTERNAL MEDICINE
Payer: COMMERCIAL

## 2019-08-19 VITALS
TEMPERATURE: 100 F | RESPIRATION RATE: 16 BRPM | HEART RATE: 177 BPM | DIASTOLIC BLOOD PRESSURE: 85 MMHG | SYSTOLIC BLOOD PRESSURE: 125 MMHG | OXYGEN SATURATION: 96 %

## 2019-08-19 DIAGNOSIS — I48.91 UNSPECIFIED ATRIAL FIBRILLATION: ICD-10-CM

## 2019-08-19 DIAGNOSIS — C61 MALIGNANT NEOPLASM OF PROSTATE: ICD-10-CM

## 2019-08-19 DIAGNOSIS — H40.20X0 UNSPECIFIED PRIMARY ANGLE-CLOSURE GLAUCOMA, STAGE UNSPECIFIED: ICD-10-CM

## 2019-08-19 DIAGNOSIS — J18.9 PNEUMONIA, UNSPECIFIED ORGANISM: ICD-10-CM

## 2019-08-19 DIAGNOSIS — I10 ESSENTIAL (PRIMARY) HYPERTENSION: ICD-10-CM

## 2019-08-19 DIAGNOSIS — C90.00 MULTIPLE MYELOMA NOT HAVING ACHIEVED REMISSION: ICD-10-CM

## 2019-08-19 DIAGNOSIS — N40.0 BENIGN PROSTATIC HYPERPLASIA WITHOUT LOWER URINARY TRACT SYMPTOMS: ICD-10-CM

## 2019-08-19 DIAGNOSIS — A41.9 SEPSIS, UNSPECIFIED ORGANISM: ICD-10-CM

## 2019-08-19 DIAGNOSIS — Z98.89 OTHER SPECIFIED POSTPROCEDURAL STATES: Chronic | ICD-10-CM

## 2019-08-19 DIAGNOSIS — R63.8 OTHER SYMPTOMS AND SIGNS CONCERNING FOOD AND FLUID INTAKE: ICD-10-CM

## 2019-08-19 LAB
ALBUMIN SERPL ELPH-MCNC: 2.5 G/DL — LOW (ref 3.3–5)
ALP SERPL-CCNC: 54 U/L — SIGNIFICANT CHANGE UP (ref 40–120)
ALT FLD-CCNC: 16 U/L — SIGNIFICANT CHANGE UP (ref 10–45)
ANION GAP SERPL CALC-SCNC: 13 MMOL/L — SIGNIFICANT CHANGE UP (ref 5–17)
ANISOCYTOSIS BLD QL: SLIGHT — SIGNIFICANT CHANGE UP
APPEARANCE UR: ABNORMAL
APTT BLD: 27.8 SEC — SIGNIFICANT CHANGE UP (ref 27.5–36.3)
APTT BLD: 29.2 SEC — SIGNIFICANT CHANGE UP (ref 27.5–36.3)
AST SERPL-CCNC: 36 U/L — SIGNIFICANT CHANGE UP (ref 10–40)
BACTERIA # UR AUTO: PRESENT /HPF
BASOPHILS # BLD AUTO: 0 K/UL — SIGNIFICANT CHANGE UP (ref 0–0.2)
BASOPHILS NFR BLD AUTO: 0 % — SIGNIFICANT CHANGE UP (ref 0–2)
BILIRUB SERPL-MCNC: 0.8 MG/DL — SIGNIFICANT CHANGE UP (ref 0.2–1.2)
BILIRUB UR-MCNC: ABNORMAL
BUN SERPL-MCNC: 25 MG/DL — HIGH (ref 7–23)
BURR CELLS BLD QL SMEAR: PRESENT — SIGNIFICANT CHANGE UP
CALCIUM SERPL-MCNC: 8 MG/DL — LOW (ref 8.4–10.5)
CHLORIDE SERPL-SCNC: 106 MMOL/L — SIGNIFICANT CHANGE UP (ref 96–108)
CK MB CFR SERPL CALC: 9.1 NG/ML — HIGH (ref 0–6.7)
CK SERPL-CCNC: 536 U/L — HIGH (ref 30–200)
CO2 SERPL-SCNC: 22 MMOL/L — SIGNIFICANT CHANGE UP (ref 22–31)
COLOR SPEC: YELLOW — SIGNIFICANT CHANGE UP
COMMENT - URINE: SIGNIFICANT CHANGE UP
CREAT SERPL-MCNC: 0.97 MG/DL — SIGNIFICANT CHANGE UP (ref 0.5–1.3)
DIFF PNL FLD: ABNORMAL
EOSINOPHIL # BLD AUTO: 0 K/UL — SIGNIFICANT CHANGE UP (ref 0–0.5)
EOSINOPHIL NFR BLD AUTO: 0 % — SIGNIFICANT CHANGE UP (ref 0–6)
EPI CELLS # UR: SIGNIFICANT CHANGE UP /HPF (ref 0–5)
GIANT PLATELETS BLD QL SMEAR: PRESENT — SIGNIFICANT CHANGE UP
GLUCOSE SERPL-MCNC: 160 MG/DL — HIGH (ref 70–99)
GLUCOSE UR QL: NEGATIVE — SIGNIFICANT CHANGE UP
HCT VFR BLD CALC: 45.5 % — SIGNIFICANT CHANGE UP (ref 39–50)
HGB BLD-MCNC: 14.4 G/DL — SIGNIFICANT CHANGE UP (ref 13–17)
INR BLD: 1.61 — HIGH (ref 0.88–1.16)
INR BLD: 1.77 — HIGH (ref 0.88–1.16)
KETONES UR-MCNC: 15 MG/DL
LACTATE SERPL-SCNC: 1.4 MMOL/L — SIGNIFICANT CHANGE UP (ref 0.5–2)
LACTATE SERPL-SCNC: 3 MMOL/L — HIGH (ref 0.5–2)
LEUKOCYTE ESTERASE UR-ACNC: NEGATIVE — SIGNIFICANT CHANGE UP
LYMPHOCYTES # BLD AUTO: 0.79 K/UL — LOW (ref 1–3.3)
LYMPHOCYTES # BLD AUTO: 3.5 % — LOW (ref 13–44)
MACROCYTES BLD QL: SLIGHT — SIGNIFICANT CHANGE UP
MANUAL SMEAR VERIFICATION: SIGNIFICANT CHANGE UP
MCHC RBC-ENTMCNC: 31.1 PG — SIGNIFICANT CHANGE UP (ref 27–34)
MCHC RBC-ENTMCNC: 31.6 GM/DL — LOW (ref 32–36)
MCV RBC AUTO: 98.3 FL — SIGNIFICANT CHANGE UP (ref 80–100)
METAMYELOCYTES # FLD: 2.6 % — HIGH (ref 0–0)
MICROCYTES BLD QL: SLIGHT — SIGNIFICANT CHANGE UP
MONOCYTES # BLD AUTO: 1.37 K/UL — HIGH (ref 0–0.9)
MONOCYTES NFR BLD AUTO: 6.1 % — SIGNIFICANT CHANGE UP (ref 2–14)
NEUTROPHILS # BLD AUTO: 19.7 K/UL — HIGH (ref 1.8–7.4)
NEUTROPHILS NFR BLD AUTO: 78.1 % — HIGH (ref 43–77)
NEUTS BAND # BLD: 9.7 % — HIGH (ref 0–8)
NITRITE UR-MCNC: NEGATIVE — SIGNIFICANT CHANGE UP
NRBC # BLD: 3 /100 — HIGH (ref 0–0)
NRBC # BLD: SIGNIFICANT CHANGE UP /100 WBCS (ref 0–0)
NT-PROBNP SERPL-SCNC: 2903 PG/ML — HIGH (ref 0–300)
OVALOCYTES BLD QL SMEAR: SLIGHT — SIGNIFICANT CHANGE UP
PH UR: 6 — SIGNIFICANT CHANGE UP (ref 5–8)
PLAT MORPH BLD: ABNORMAL
PLATELET # BLD AUTO: 326 K/UL — SIGNIFICANT CHANGE UP (ref 150–400)
POIKILOCYTOSIS BLD QL AUTO: SLIGHT — SIGNIFICANT CHANGE UP
POLYCHROMASIA BLD QL SMEAR: SLIGHT — SIGNIFICANT CHANGE UP
POTASSIUM SERPL-MCNC: 3.7 MMOL/L — SIGNIFICANT CHANGE UP (ref 3.5–5.3)
POTASSIUM SERPL-SCNC: 3.7 MMOL/L — SIGNIFICANT CHANGE UP (ref 3.5–5.3)
PROT SERPL-MCNC: 6.2 G/DL — SIGNIFICANT CHANGE UP (ref 6–8.3)
PROT UR-MCNC: 100 MG/DL
PROTHROM AB SERPL-ACNC: 18.5 SEC — HIGH (ref 10–12.9)
PROTHROM AB SERPL-ACNC: 20.3 SEC — HIGH (ref 10–12.9)
RBC # BLD: 4.63 M/UL — SIGNIFICANT CHANGE UP (ref 4.2–5.8)
RBC # FLD: 15.9 % — HIGH (ref 10.3–14.5)
RBC BLD AUTO: ABNORMAL
RBC CASTS # UR COMP ASSIST: > 10 /HPF
SCHISTOCYTES BLD QL AUTO: SLIGHT — SIGNIFICANT CHANGE UP
SODIUM SERPL-SCNC: 141 MMOL/L — SIGNIFICANT CHANGE UP (ref 135–145)
SP GR SPEC: 1.02 — SIGNIFICANT CHANGE UP (ref 1–1.03)
SPHEROCYTES BLD QL SMEAR: SLIGHT — SIGNIFICANT CHANGE UP
TROPONIN T SERPL-MCNC: <0.01 NG/ML — SIGNIFICANT CHANGE UP (ref 0–0.01)
UROBILINOGEN FLD QL: 0.2 E.U./DL — SIGNIFICANT CHANGE UP
WBC # BLD: 22.44 K/UL — HIGH (ref 3.8–10.5)
WBC # FLD AUTO: 22.44 K/UL — HIGH (ref 3.8–10.5)
WBC UR QL: < 5 /HPF — SIGNIFICANT CHANGE UP

## 2019-08-19 PROCEDURE — 99233 SBSQ HOSP IP/OBS HIGH 50: CPT | Mod: GC

## 2019-08-19 PROCEDURE — 93010 ELECTROCARDIOGRAM REPORT: CPT

## 2019-08-19 PROCEDURE — 71045 X-RAY EXAM CHEST 1 VIEW: CPT | Mod: 26

## 2019-08-19 PROCEDURE — 99291 CRITICAL CARE FIRST HOUR: CPT

## 2019-08-19 RX ORDER — HYDROXYUREA 500 MG/1
500 CAPSULE ORAL EVERY 12 HOURS
Refills: 0 | Status: DISCONTINUED | OUTPATIENT
Start: 2019-08-19 | End: 2019-08-28

## 2019-08-19 RX ORDER — LANOLIN ALCOHOL/MO/W.PET/CERES
5 CREAM (GRAM) TOPICAL AT BEDTIME
Refills: 0 | Status: DISCONTINUED | OUTPATIENT
Start: 2019-08-19 | End: 2019-08-28

## 2019-08-19 RX ORDER — SODIUM CHLORIDE 9 MG/ML
500 INJECTION INTRAMUSCULAR; INTRAVENOUS; SUBCUTANEOUS ONCE
Refills: 0 | Status: COMPLETED | OUTPATIENT
Start: 2019-08-19 | End: 2019-08-19

## 2019-08-19 RX ORDER — VANCOMYCIN HCL 1 G
1000 VIAL (EA) INTRAVENOUS ONCE
Refills: 0 | Status: COMPLETED | OUTPATIENT
Start: 2019-08-19 | End: 2019-08-19

## 2019-08-19 RX ORDER — PIPERACILLIN AND TAZOBACTAM 4; .5 G/20ML; G/20ML
3.38 INJECTION, POWDER, LYOPHILIZED, FOR SOLUTION INTRAVENOUS ONCE
Refills: 0 | Status: COMPLETED | OUTPATIENT
Start: 2019-08-19 | End: 2019-08-19

## 2019-08-19 RX ORDER — CALCIUM CARBONATE 500(1250)
1 TABLET ORAL EVERY 6 HOURS
Refills: 0 | Status: DISCONTINUED | OUTPATIENT
Start: 2019-08-19 | End: 2019-08-28

## 2019-08-19 RX ORDER — SODIUM CHLORIDE 9 MG/ML
1000 INJECTION INTRAMUSCULAR; INTRAVENOUS; SUBCUTANEOUS ONCE
Refills: 0 | Status: COMPLETED | OUTPATIENT
Start: 2019-08-19 | End: 2019-08-19

## 2019-08-19 RX ORDER — ASPIRIN/CALCIUM CARB/MAGNESIUM 324 MG
81 TABLET ORAL DAILY
Refills: 0 | Status: DISCONTINUED | OUTPATIENT
Start: 2019-08-19 | End: 2019-08-28

## 2019-08-19 RX ORDER — METOPROLOL TARTRATE 50 MG
5 TABLET ORAL ONCE
Refills: 0 | Status: COMPLETED | OUTPATIENT
Start: 2019-08-19 | End: 2019-08-19

## 2019-08-19 RX ORDER — ACETAMINOPHEN 500 MG
650 TABLET ORAL ONCE
Refills: 0 | Status: COMPLETED | OUTPATIENT
Start: 2019-08-19 | End: 2019-08-19

## 2019-08-19 RX ORDER — ACETAMINOPHEN 500 MG
650 TABLET ORAL EVERY 6 HOURS
Refills: 0 | Status: DISCONTINUED | OUTPATIENT
Start: 2019-08-19 | End: 2019-08-28

## 2019-08-19 RX ORDER — NYSTATIN 500MM UNIT
500000 POWDER (EA) MISCELLANEOUS
Refills: 0 | Status: DISCONTINUED | OUTPATIENT
Start: 2019-08-19 | End: 2019-08-23

## 2019-08-19 RX ORDER — METOPROLOL TARTRATE 50 MG
5 TABLET ORAL EVERY 6 HOURS
Refills: 0 | Status: DISCONTINUED | OUTPATIENT
Start: 2019-08-19 | End: 2019-08-21

## 2019-08-19 RX ORDER — PIPERACILLIN AND TAZOBACTAM 4; .5 G/20ML; G/20ML
4.5 INJECTION, POWDER, LYOPHILIZED, FOR SOLUTION INTRAVENOUS EVERY 6 HOURS
Refills: 0 | Status: DISCONTINUED | OUTPATIENT
Start: 2019-08-19 | End: 2019-08-20

## 2019-08-19 RX ADMIN — SODIUM CHLORIDE 1000 MILLILITER(S): 9 INJECTION INTRAMUSCULAR; INTRAVENOUS; SUBCUTANEOUS at 11:55

## 2019-08-19 RX ADMIN — SODIUM CHLORIDE 1000 MILLILITER(S): 9 INJECTION INTRAMUSCULAR; INTRAVENOUS; SUBCUTANEOUS at 11:50

## 2019-08-19 RX ADMIN — Medication 650 MILLIGRAM(S): at 11:50

## 2019-08-19 RX ADMIN — Medication 250 MILLIGRAM(S): at 12:51

## 2019-08-19 RX ADMIN — Medication 5 MILLIGRAM(S): at 20:05

## 2019-08-19 RX ADMIN — PIPERACILLIN AND TAZOBACTAM 200 GRAM(S): 4; .5 INJECTION, POWDER, LYOPHILIZED, FOR SOLUTION INTRAVENOUS at 20:21

## 2019-08-19 RX ADMIN — PIPERACILLIN AND TAZOBACTAM 200 GRAM(S): 4; .5 INJECTION, POWDER, LYOPHILIZED, FOR SOLUTION INTRAVENOUS at 12:15

## 2019-08-19 RX ADMIN — SODIUM CHLORIDE 1000 MILLILITER(S): 9 INJECTION INTRAMUSCULAR; INTRAVENOUS; SUBCUTANEOUS at 21:36

## 2019-08-19 RX ADMIN — Medication 5 MILLIGRAM(S): at 21:31

## 2019-08-19 RX ADMIN — PIPERACILLIN AND TAZOBACTAM 3.38 GRAM(S): 4; .5 INJECTION, POWDER, LYOPHILIZED, FOR SOLUTION INTRAVENOUS at 12:51

## 2019-08-19 RX ADMIN — SODIUM CHLORIDE 500 MILLILITER(S): 9 INJECTION INTRAMUSCULAR; INTRAVENOUS; SUBCUTANEOUS at 12:00

## 2019-08-19 NOTE — ED PROVIDER NOTE - SECONDARY DIAGNOSIS.
Atrial fibrillation, unspecified type Pneumonia due to infectious organism, unspecified laterality, unspecified part of lung Multiple myeloma, remission status unspecified

## 2019-08-19 NOTE — ED ADULT TRIAGE NOTE - OTHER COMPLAINTS
per ems , pt has been sitting on the toilet since yesterday afternoon. pt c.o generalized weakness with b/l lower extremity edema. hx multiple myeloma. last chemo was last week. pt tachycardic with oral temp. sepsis code called. fs 131

## 2019-08-19 NOTE — ED PROVIDER NOTE - PHYSICAL EXAMINATION
VITAL SIGNS: I have reviewed nursing notes and confirm.  CONSTITUTIONAL: Well-developed; well-nourished; in no acute distress.   SKIN:  warm and dry, no acute rash.   HEAD:  normocephalic, atraumatic.  EYES: PERRL, EOM intact; conjunctiva and sclera clear.  ENT: No nasal discharge; airway clear.   NECK: Supple; non tender.  CARD: S1, S2 normal; no murmurs, gallops, or rubs. Tachycardic, regular rhythm.  RESP:  Rales at right lung base.  ABD: Normal bowel sounds; soft; non-distended; non-tender; no guarding/ rebound.  EXT: Chronic pitting edema to b/l lower extremities.   NEURO: Alert, oriented, grossly unremarkable  PSYCH: Cooperative, mood and affect appropriate. VITAL SIGNS: I have reviewed nursing notes and confirm.  CONSTITUTIONAL: Well-developed; well-nourished; elderly male in no acute distress.   SKIN:  warm and dry, no acute rash. No sacral decubiti.   HEAD:  normocephalic, atraumatic.  EYES: EOM intact; conjunctiva and sclera clear.  ENT: No nasal discharge; airway clear.   NECK: Supple.  CARD: S1, S2 normal; no murmurs, gallops, or rubs. Tachycardic, regular rhythm.  RESP:  Rales to left lung base, no wheezes/ rhonchi.  ABD: Normal bowel sounds; soft; non-distended; non-tender; no guarding/ rebound.  EXT: Chronic pitting edema to b/l lower extremities.   NEURO: Awake, but confused, oriented to name, , place, moves all ext.   PSYCH: Cooperative

## 2019-08-19 NOTE — H&P ADULT - PROBLEM SELECTOR PLAN 1
-likely secondary to pneumonia. Focal -likely secondary to pneumonia. Focal  Pt meeting 3/4 sepsis criteria given fever, tachycardia and leukocytosis along with elevated lactate. CXR with LLL infiltrate; elvira CAP given no recent hospitalization, no signs//symptoms of aspiration. However given recent chemotherapy and immunocomprimised status will cover for nosocomial infection including pseudomonas.   - s/p 2.5L NS bolus   - trend lactate to clear  - s/p Vanc and Zosyn in ED, will continue with zosyn   - UA negative  - f/up blood cultures -likely secondary to pneumonia. Focal consolidation in LLL on CXR  Pt meeting 3/4 sepsis criteria given fever, tachycardia and leukocytosis along with elevated lactate. CXR with LLL infiltrate; likley CAP given no recent hospitalization, no signs//symptoms of aspiration. However given recent chemotherapy and immunocompromised status will cover for nosocomial infection including pseudomonas.   - s/p 2.5L NS bolus   - Lactate trended: 3.0 to 1.4  - s/p Vanc and Zosyn in ED, will continue with zosyn anti-pseudomonal dose  - UA negative  - f/up blood cultures

## 2019-08-19 NOTE — CONSULT NOTE ADULT - SUBJECTIVE AND OBJECTIVE BOX
A 84yo M with PMH of multiple myeloma on Chemotherapy(last tx Friday), Prostate Cancer(s/p SEED therapy), paroxsymal Afib (not an AC due to Hx of GIB), Recurrent DVTs, presents with symptoms of lethargy for one day. Per patient wife at bedside patient has been more tired for the past day and this morning was noted to be sitting on the toilet and unable to get up due to fatigue. No LOC, no lightheadedness. Patient otherwise denies any symptoms including cough, nasal congestion, fevers/chills, CP/SOB, abdominal pain, nausea/vomiting, diarhea, dysuria.   Of note, Pt currently on chemotherapy( ) last treatment on friday. Pt also currenly on steroid treatment.     CCM SERVICE CONSULTATION NOTE    CC:    HPI:    ROS:  Otherwise negative, except as specified in HPI.    PMH:    PSH:    FH:    SH:    ALLERGIES:    MEDICATIONS: Decadron 4mg (5 tabs 4x a week T/W/F/S), Dutaseride 0.5mg QD, Hydroxyurea 1g QD, Velcade (4x a week- 2 weeks on and one week off, currently on off week),     VITAL SIGNS:  ICU Vital Signs Last 24 Hrs  T(C): 36.7 (19 Aug 2019 13:33), Max: 38.5 (19 Aug 2019 11:40)  T(F): 98.1 (19 Aug 2019 13:33), Max: 101.3 (19 Aug 2019 11:40)  HR: 112 (19 Aug 2019 16:32) (96 - 177)  BP: 143/102 (19 Aug 2019 16:32) (101/67 - 150/72)  BP(mean): --  ABP: --  ABP(mean): --  RR: 14 (19 Aug 2019 15:28) (14 - 16)  SpO2: 96% (19 Aug 2019 15:28) (94% - 96%)    CAPILLARY BLOOD GLUCOSE          PHYSICAL EXAM:  Constitutional: resting comfortably in bed, NAD  HEENT: NC/AT; PERRL, anicteric sclera; no oropharyngeal erythema or exudates; MMM  Neck: supple, no appreciable JVD  Respiratory: CTA B/L, no W/R/R; respirations appear non-labored, conversive in full sentences  Cardiovascular: +S1/S2, RRR  Gastrointestinal: abdomen soft, NT/ND  Extremities: WWP; no clubbing, cyanosis or edema  Vascular: 2+ radial, femoral, and DP/PT pulses B/L  Dermatologic: skin normal color and turgor; no visible rashes  Neurological:     LABS:                        14.4   22.44 )-----------( 326      ( 19 Aug 2019 11:53 )             45.5     08-19    141  |  106  |  25<H>  ----------------------------<  160<H>  3.7   |  22  |  0.97    Ca    8.0<L>      19 Aug 2019 12:41    TPro  6.2  /  Alb  2.5<L>  /  TBili  0.8  /  DBili  x   /  AST  36  /  ALT  16  /  AlkPhos  54  08-19    PT/INR - ( 19 Aug 2019 12:41 )   PT: 20.3 sec;   INR: 1.77          PTT - ( 19 Aug 2019 12:41 )  PTT:29.2 sec  Lactate, Blood: 1.4 mmoL/L (08-19-19 @ 14:56)  Lactate, Blood: 3.0 mmoL/L (08-19-19 @ 11:52)    CARDIAC MARKERS ( 19 Aug 2019 12:41 )  x     / <0.01 ng/mL / 536 U/L / x     / 9.1 ng/mL      Urinalysis Basic - ( 19 Aug 2019 12:08 )    Color: x / Appearance: x / SG: x / pH: x  Gluc: x / Ketone: x  / Bili: x / Urobili: x   Blood: x / Protein: x / Nitrite: x   Leuk Esterase: x / RBC: > 10 /HPF / WBC < 5 /HPF   Sq Epi: x / Non Sq Epi: 0-5 /HPF / Bacteria: Present /HPF          EKG: Reviewed.    RADIOLOGY & ADDITIONAL TESTS: Reviewed. A 84yo M with PMH of multiple myeloma on Chemotherapy(last tx Friday), Prostate Cancer(s/p SEED therapy), paroxsymal Afib (not an AC due to Hx of GIB), Recurrent DVTs, presents with symptoms of lethargy for one day. Per patient wife at bedside patient has been more tired for the past day and this morning was noted to be sitting on the toilet and unable to get up due to fatigue. No LOC, no lightheadedness. Patient otherwise denies any symptoms including cough, nasal congestion, fevers/chills, CP/SOB, abdominal pain, nausea/vomiting, diarhea, dysuria.   Of note, Pt currently on chemotherapy( ) last treatment on friday. Pt also currenly on steroid treatment.     ROS:  Otherwise negative, except as specified in HPI.    PMH:  multiple myeloma on Chemotherapy(last tx Friday), Prostate Cancer(s/p SEED therapy), paroxsymal Afib (not an AC due to Hx of GIB), Recurrent DVTs    PSH: Left hernia repair     FH: no pertinent     SH: Denies smoking, alcohol or illicit drug use    ALLERGIES: NKDA    MEDICATIONS: Decadron 4mg (5 tabs 4x a week T/W/F/S), Dutaseride 0.5mg QD, Hydroxyurea 1g QD, Velcade (4x a week- 2 weeks on and one week off, currently on off week),     VITAL SIGNS:  ICU Vital Signs Last 24 Hrs  T(C): 36.7 (19 Aug 2019 13:33), Max: 38.5 (19 Aug 2019 11:40)  T(F): 98.1 (19 Aug 2019 13:33), Max: 101.3 (19 Aug 2019 11:40)  HR: 112 (19 Aug 2019 16:32) (96 - 177)  BP: 143/102 (19 Aug 2019 16:32) (101/67 - 150/72)  BP(mean): --  ABP: --  ABP(mean): --  RR: 14 (19 Aug 2019 15:28) (14 - 16)  SpO2: 96% (19 Aug 2019 15:28) (94% - 96%)    CAPILLARY BLOOD GLUCOSE          PHYSICAL EXAM:  Constitutional: Elderly male in NAD  HEENT: NC/AT; PERRL, anicteric sclera; no oropharyngeal erythema or exudates; MMM  Neck: supple, no appreciable JVD  Respiratory: Bibasilar rhonchi, no crackles.   Cardiovascular: +S1/S2, RRR  Gastrointestinal: abdomen soft, NT/ND  Extremities: @+ edema b/l, non tender, neg homans   Vascular: 2+ radial, femoral, and DP/PT pulses B/L  Dermatologic: skin normal color and turgor; no visible rashes  Neurological: AO x3, slow to responses, in/out of sleep, slightly lethargic, responsive to commands, CN intact, no focal defecits.     LABS:                        14.4   22.44 )-----------( 326      ( 19 Aug 2019 11:53 )             45.5     08-19    141  |  106  |  25<H>  ----------------------------<  160<H>  3.7   |  22  |  0.97    Ca    8.0<L>      19 Aug 2019 12:41    TPro  6.2  /  Alb  2.5<L>  /  TBili  0.8  /  DBili  x   /  AST  36  /  ALT  16  /  AlkPhos  54  08-19    PT/INR - ( 19 Aug 2019 12:41 )   PT: 20.3 sec;   INR: 1.77          PTT - ( 19 Aug 2019 12:41 )  PTT:29.2 sec  Lactate, Blood: 1.4 mmoL/L (08-19-19 @ 14:56)  Lactate, Blood: 3.0 mmoL/L (08-19-19 @ 11:52)    CARDIAC MARKERS ( 19 Aug 2019 12:41 )  x     / <0.01 ng/mL / 536 U/L / x     / 9.1 ng/mL      Urinalysis Basic - ( 19 Aug 2019 12:08 )    Color: x / Appearance: x / SG: x / pH: x  Gluc: x / Ketone: x  / Bili: x / Urobili: x   Blood: x / Protein: x / Nitrite: x   Leuk Esterase: x / RBC: > 10 /HPF / WBC < 5 /HPF   Sq Epi: x / Non Sq Epi: 0-5 /HPF / Bacteria: Present /HPF          EKG: Reviewed.    RADIOLOGY & ADDITIONAL TESTS: Reviewed. A 84yo M with PMH of multiple myeloma on Chemotherapy(last tx Friday), Prostate Cancer(s/p SEED therapy), paroxsymal Afib (not an AC due to Hx of GIB), Recurrent DVTs, presents with symptoms of lethargy for one day. Per patient wife at bedside patient has been more tired for the past day and this morning was noted to be sitting on the toilet and unable to get up due to fatigue. No LOC, no lightheadedness. Patient otherwise denies any symptoms including cough, nasal congestion, fevers/chills, CP/SOB, abdominal pain, nausea/vomiting, diarhea, dysuria.   Of note, Pt currently on chemotherapy last treatment on Friday Pt also currently on steroids as part of regimen.      ROS:  Otherwise negative, except as specified in HPI.    PMH:  multiple myeloma on Chemotherapy(last tx Friday), Prostate Cancer(s/p SEED therapy), paroxsymal Afib (not an AC due to Hx of GIB), Recurrent DVTs    PSH: Left hernia repair     FH: no pertinent     SH: Denies smoking, alcohol or illicit drug use    ALLERGIES: NKDA    MEDICATIONS: Decadron 4mg (5 tabs 4x a week T/W/F/S), Dutaseride 0.5mg QD, Hydroxyurea 1g QD, Velcade (4x a week- 2 weeks on and one week off, currently on off week), Tamsulosin 0.4mg, latanoprost,  furosemide 20mg QD,     VITAL SIGNS:  ICU Vital Signs Last 24 Hrs  T(C): 36.7 (19 Aug 2019 13:33), Max: 38.5 (19 Aug 2019 11:40)  T(F): 98.1 (19 Aug 2019 13:33), Max: 101.3 (19 Aug 2019 11:40)  HR: 112 (19 Aug 2019 16:32) (96 - 177)  BP: 143/102 (19 Aug 2019 16:32) (101/67 - 150/72)  BP(mean): --  ABP: --  ABP(mean): --  RR: 14 (19 Aug 2019 15:28) (14 - 16)  SpO2: 96% (19 Aug 2019 15:28) (94% - 96%)    CAPILLARY BLOOD GLUCOSE          PHYSICAL EXAM:  Constitutional: Elderly male in NAD  HEENT: NC/AT; PERRL, anicteric sclera; no oropharyngeal erythema or exudates; MMM  Neck: supple, no appreciable JVD  Respiratory: Bibasilar rhonchi, no crackles.   Cardiovascular: +S1/S2, RRR  Gastrointestinal: abdomen soft, NT/ND  Extremities: @+ edema b/l, non tender, neg homans   Vascular: 2+ radial, femoral, and DP/PT pulses B/L  Dermatologic: skin normal color and turgor; no visible rashes  Neurological: AO x3, slow to responses, in/out of sleep, slightly lethargic, responsive to commands, CN intact, no focal defecits.     LABS:                        14.4   22.44 )-----------( 326      ( 19 Aug 2019 11:53 )             45.5     08-19    141  |  106  |  25<H>  ----------------------------<  160<H>  3.7   |  22  |  0.97    Ca    8.0<L>      19 Aug 2019 12:41    TPro  6.2  /  Alb  2.5<L>  /  TBili  0.8  /  DBili  x   /  AST  36  /  ALT  16  /  AlkPhos  54  08-19    PT/INR - ( 19 Aug 2019 12:41 )   PT: 20.3 sec;   INR: 1.77          PTT - ( 19 Aug 2019 12:41 )  PTT:29.2 sec  Lactate, Blood: 1.4 mmoL/L (08-19-19 @ 14:56)  Lactate, Blood: 3.0 mmoL/L (08-19-19 @ 11:52)    CARDIAC MARKERS ( 19 Aug 2019 12:41 )  x     / <0.01 ng/mL / 536 U/L / x     / 9.1 ng/mL      Urinalysis Basic - ( 19 Aug 2019 12:08 )    Color: x / Appearance: x / SG: x / pH: x  Gluc: x / Ketone: x  / Bili: x / Urobili: x   Blood: x / Protein: x / Nitrite: x   Leuk Esterase: x / RBC: > 10 /HPF / WBC < 5 /HPF   Sq Epi: x / Non Sq Epi: 0-5 /HPF / Bacteria: Present /HPF          EKG: Reviewed.    RADIOLOGY & ADDITIONAL TESTS: Reviewed.

## 2019-08-19 NOTE — ED PROVIDER NOTE - CLINICAL SUMMARY MEDICAL DECISION MAKING FREE TEXT BOX
Sepsis code activated. 84 y/o M with hx of myeloma on chemo with last treatment x3 days ago presents to the ED by ambulance with complaints of lethargy and weakness secondary to sepsis.  Rectal temperature positive for fever, with associated tachycardia. EKG showing tachycardic rhythm with ?underlying flutter but no ischemic changes. Labs show positive leucocytosis to 22.4 with elevated lactate to 3. Pt ordered for IV fluid bolus and antibiotics. Will continue to monitor. Impression: acute sepsis 2/2 left sided pna. Sepsis code activated upon arrival. Febrile with tachycardia to 180's, vs otherwise stable. EKG showing tachycardic rhythm, ? aflutter vs. afib, no st elev/ dep. Labs show leucocytosis to 22.4 with elevated lactate to 3. Pt ordered for IV fluid bolus, vanc/ zosyn. UA neg for infection. Tachycardia improved to low 100's, with afib on monitor. Repeat lactate normalized. Suspect afib 2/2 sepsis. Pt seen by icu and accepted for admission to [facility] for further management.

## 2019-08-19 NOTE — H&P ADULT - PROBLEM SELECTOR PROBLEM 6
Multiple myeloma not having achieved remission Benign prostatic hyperplasia, unspecified whether lower urinary tract symptoms present Prostate cancer

## 2019-08-19 NOTE — H&P ADULT - PROBLEM SELECTOR PROBLEM 4
Benign prostatic hyperplasia, unspecified whether lower urinary tract symptoms present Multiple myeloma not having achieved remission

## 2019-08-19 NOTE — H&P ADULT - ASSESSMENT
A 82yo M with PMH of multiple myeloma on Chemotherapy(last tx Friday), Prostate Cancer(s/p SEED therapy), paroxsymal Afib (not an AC due to Hx of GIB), Recurrent DVTs, presents with symptoms of lethargy for one day; meeting severe sepsis criteria on admission likely 2/2 CAP along with Afib RVR     #Severe sepsis: Pt meeting 3/4 sepsis criteria given fever, tachycardia and leukocytosis along with elevated lactate. CXR with LLL infiltrate; likley CAP given no recent hospitalization, no signs//symptoms of aspiration. However given recent chemotherapy and immunocomprimised status will cover for nosocomial infection including pseudomonas.   - s/p 2.5L NS bolus   - trend lactate to clear  - s/p Vanc and Zosyn in ED, will continue with zosyn   - UA negative  - f/up blood cultures     #Afib RVR: A 84yo M with PMH of multiple myeloma on Chemotherapy(last tx Friday), Prostate Cancer(s/p SEED therapy), paroxsymal Afib (not an AC due to Hx of GIB), Recurrent DVTs, presents with symptoms of lethargy for one day; meeting severe sepsis criteria on admission likely 2/2 CAP along with Afib RVR     #Severe sepsis:     #Afib RVR: A 82yo M with PMH of multiple myeloma on Chemotherapy(last tx Friday), Prostate Cancer(s/p SEED therapy), paroxsymal Afib (not an AC due to Hx of GIB), Recurrent DVTs, presents with symptoms of lethargy for one day; meeting severe sepsis criteria on admission likely 2/2 CAP along with Afib RVR

## 2019-08-19 NOTE — H&P ADULT - NSHPLABSRESULTS_GEN_ALL_CORE
LABS:                         14.4   22.44 )-----------( 326      ( 19 Aug 2019 11:53 )             45.5     08-19    141  |  106  |  25<H>  ----------------------------<  160<H>  3.7   |  22  |  0.97    Ca    8.0<L>      19 Aug 2019 12:41    TPro  6.2  /  Alb  2.5<L>  /  TBili  0.8  /  DBili  x   /  AST  36  /  ALT  16  /  AlkPhos  54  08-19    PT/INR - ( 19 Aug 2019 12:41 )   PT: 20.3 sec;   INR: 1.77          PTT - ( 19 Aug 2019 12:41 )  PTT:29.2 sec  Urinalysis Basic - ( 19 Aug 2019 12:08 )    Color: x / Appearance: x / SG: x / pH: x  Gluc: x / Ketone: x  / Bili: x / Urobili: x   Blood: x / Protein: x / Nitrite: x   Leuk Esterase: x / RBC: > 10 /HPF / WBC < 5 /HPF   Sq Epi: x / Non Sq Epi: 0-5 /HPF / Bacteria: Present /HPF      CARDIAC MARKERS ( 19 Aug 2019 12:41 )  x     / <0.01 ng/mL / 536 U/L / x     / 9.1 ng/mL      Serum Pro-Brain Natriuretic Peptide: 2903 pg/mL (08-19 @ 12:41)    Lactate, Blood: 1.4 mmoL/L (08-19 @ 14:56)  Lactate, Blood: 3.0 mmoL/L (08-19 @ 11:52)      RADIOLOGY, EKG & ADDITIONAL TESTS: Reviewed.

## 2019-08-19 NOTE — CONSULT NOTE ADULT - ASSESSMENT
A 82yo M with PMH of multiple myeloma on Chemotherapy(last tx Friday), Prostate Cancer(s/p SEED therapy), paroxsymal Afib (not an AC due to Hx of GIB), Recurrent DVTs, presents with symptoms of lethargy for one day; meeting severe sepsis criteria on admission likely 2/2 CAP along with Afib RVR     #Severe sepsis: Pt meeting 3/4 sepsis criteria given fever, tachycardia and leukocytosis A 84yo M with PMH of multiple myeloma on Chemotherapy(last tx Friday), Prostate Cancer(s/p SEED therapy), paroxsymal Afib (not an AC due to Hx of GIB), Recurrent DVTs, presents with symptoms of lethargy for one day; meeting severe sepsis criteria on admission likely 2/2 CAP along with Afib RVR     #Severe sepsis: Pt meeting 3/4 sepsis criteria given fever, tachycardia and leukocytosis along with elevated lactate. CXR with LLL infiltrate; likley CAP given no recent hospitalization, no signs//symptoms of aspiration. However given recent chemotherapy and immunocomprimised status will cover for nosocomial infection including pseudomonas.   - s/p 2.5L NS bolus   - trend lactate to clear  - s/p Vanc and Zosyn in ED, will continue with zosyn   - UA negative  - f/up blood cultures     #Afib RVR: A 84yo M with PMH of multiple myeloma on Chemotherapy(last tx Friday), Prostate Cancer(s/p SEED therapy), paroxsymal Afib (not an AC due to Hx of GIB), Recurrent DVTs, presents with symptoms of lethargy for one day; meeting severe sepsis criteria on admission likely 2/2 CAP along with Afib RVR     #Severe sepsis: Pt meeting 3/4 sepsis criteria given fever, tachycardia and leukocytosis along with elevated lactate. CXR with LLL infiltrate; likley CAP given no recent hospitalization, no signs//symptoms of aspiration. However given recent chemotherapy and immunocomprimised status will cover for nosocomial infection including pseudomonas.   - s/p 2.5L NS bolus   - trend lactate to clear  - s/p Vanc and Zosyn in ED, will continue with zosyn   - UA negative  - f/up blood cultures     #Afib RVR: Pt in Afib RVR on admission w/ HR 140s likely 2/2 sepsis. Thought to be new onset however per wife pt recently diagnosed with irregular HR in January 2019. Anticoagulation not started at the time due to age and hx of previous GIB.   - Sepsis management above  - HR improved to 100-110s.   - continue to monitor on tele   - no ischemic changes on EKG  - trops negative x1   - Pt with chronic hx of b/l LE edema, no other signs of overload, no previous echo  - f/up echocardiogram   - would hold AC given concern for GIB in the past   - Consider consult cardiology   - obtain colateral from PCP Dr. Burnham    Dispo: 7 [facility]   Discussed with Attending Dr. White

## 2019-08-19 NOTE — H&P ADULT - NSHPPHYSICALEXAM_GEN_ALL_CORE
PHYSICAL EXAM:  Constitutional: Elderly male in NAD  HEENT: NC/AT; PERRL, anicteric sclera; no oropharyngeal erythema or exudates; MMM  Neck: supple, no appreciable JVD  Respiratory: Bibasilar rhonchi, no crackles.   Cardiovascular: +S1/S2, RRR, parasternal heave  Gastrointestinal: abdomen soft, NT/ND  Extremities: 4+ pitting edema, non tender, neg homans   Vascular: 2+ radial, femoral, and DP/PT pulses B/L  Dermatologic: skin normal color and turgor; no visible rashes  Neurological: AO x3, slow to responses, in/out of sleep, slightly lethargic, responsive to commands, CN intact, no focal defecits. PHYSICAL EXAM:  Constitutional: Elderly male in NAD  HEENT: NC/AT; PERRL, anicteric sclera; no oropharyngeal erythema or exudates; MMM  Neck: supple, no appreciable JVD  Respiratory: Bibasilar rhonchi, no crackles.   Cardiovascular: +S1/S2, RRR, parasternal heave  Gastrointestinal: abdomen soft, NT/ND  Extremities: 4+ pitting edema, non tender, neg homans   Vascular: 2+ radial, femoral, and DP/PT pulses B/L  Dermatologic: skin normal color and turgor; no visible rashes  Neurological: AO x1, slow to responses, in/out of sleep, slightly lethargic, responsive to commands, CN intact, no focal deficits.

## 2019-08-19 NOTE — H&P ADULT - PROBLEM SELECTOR PLAN 3
-Now rate controlled -Afib with RVR  -Not on anticoagulation as an outpatient due to history of GI bleeds  -Continue monitoring on telemetry   - no ischemic changes on EKG  - trops negative x1   - patient with significant BL pitting edema 4+, no other signs of overload, no previous echo  - f/up echocardiogram   - Consider consult cardiology   - obtain collateral from PCP Dr. Nolen

## 2019-08-19 NOTE — H&P ADULT - NSICDXPASTMEDICALHX_GEN_ALL_CORE_FT
PAST MEDICAL HISTORY:  BPH (benign prostatic hyperplasia)     DVT (deep venous thrombosis)     Glaucoma     Myeloma     Prostate cancer

## 2019-08-19 NOTE — ED PROVIDER NOTE - OBJECTIVE STATEMENT
82 y/o M with PMHx of myeloma on chemo with last treatment 3 days ago presents to the ED by ambulance for generalized weakness and decreased PO intake. Pt was sitting on his Commode and was unable to get off of it due to weakness. Pt also has decreased PO intake since yesterday and was noted to be lethargic by his wife. Pt's wife denies any fevers at home, URI symptoms, vomiting, diarrhea, belly pain, or urinary symptoms. Denies chest pain or SOB. 84 y/o M with PMHx of myeloma on chemo with last treatment 3 days ago presents to the ED by ambulance for generalized weakness and decreased PO intake. Pt was sitting on his Commode and was unable to get off of it due to weakness. Pt also has decreased PO intake since yesterday and was noted to be lethargic by his wife. No fevers at home, URI symptoms, vomiting, diarrhea, abd pain, or urinary symptoms. Denies chest pain or SOB.

## 2019-08-19 NOTE — H&P ADULT - HISTORY OF PRESENT ILLNESS
A 84yo M with PMH of multiple myeloma on Chemotherapy(last tx Friday), Prostate Cancer(s/p SEED therapy), paroxsymal Afib (not an AC due to Hx of GIB), Recurrent DVTs, presents with symptoms of lethargy for one day. Per patient wife at bedside patient has been more tired for the past day and this morning was noted to be sitting on the toilet and unable to get up due to fatigue. No LOC, no lightheadedness. Patient otherwise denies any symptoms including cough, nasal congestion, fevers/chills, CP/SOB, abdominal pain, nausea/vomiting, diarhea, dysuria.   Of note, patient currently on chemotherapy( ) last treatment on Friday. Also, patient currently on steroid treatment.

## 2019-08-19 NOTE — H&P ADULT - PROBLEM SELECTOR PLAN 7
-Continue home medication of latanoprost and alphagan -Patient takes tamulosin 0.4 as a home medication  -Consider starting if signs of urinary retention

## 2019-08-19 NOTE — H&P ADULT - PROBLEM SELECTOR PLAN 8
F: S/p 2.5 L in ED  E: Replete K <3.5 and Mg > 2  N: Dash -Continue home medication of latanoprost and alphagan

## 2019-08-19 NOTE — ED PROVIDER NOTE - DIAGNOSTIC INTERPRETATION
ER Physician: June Ree  CHEST XRAY INTERPRETATION: + left infiltrate, heart shadow normal, bony structures intact

## 2019-08-19 NOTE — H&P ADULT - PROBLEM SELECTOR PROBLEM 8
Nutrition, metabolism, and development symptoms Primary angle-closure glaucoma, unspecified glaucoma stage, unspecified laterality, unspecified primary angle-closure glaucoma type

## 2019-08-19 NOTE — H&P ADULT - PROBLEM SELECTOR PROBLEM 7
Primary angle-closure glaucoma, unspecified glaucoma stage, unspecified laterality, unspecified primary angle-closure glaucoma type Benign prostatic hyperplasia, unspecified whether lower urinary tract symptoms present

## 2019-08-19 NOTE — H&P ADULT - PROBLEM SELECTOR PLAN 4
-Receiving treatment as an outpatient. 2 weeks on 1 week off, per wife.  -On Decadron as an outpatient

## 2019-08-19 NOTE — ED ADULT NURSE NOTE - OBJECTIVE STATEMENT
altered activity since going to bathroom last night -- arrives in ED awake to voise , incontinent of urine and stool; denies pain ; oritent to slef and place

## 2019-08-19 NOTE — ED PROVIDER NOTE - CARE PLAN
Principal Discharge DX:	Sepsis, due to unspecified organism  Secondary Diagnosis:	Pneumonia due to infectious organism, unspecified laterality, unspecified part of lung  Secondary Diagnosis:	Multiple myeloma, remission status unspecified  Secondary Diagnosis:	Atrial fibrillation, unspecified type

## 2019-08-19 NOTE — H&P ADULT - NSHPSOCIALHISTORY_GEN_ALL_CORE
Per Patient's wife, Mr. Chicas does not drink alcohol, has never smoked, and denies illicit drug use.

## 2019-08-19 NOTE — H&P ADULT - PROBLEM SELECTOR PLAN 5
-S/p Seed procedure -Takes HCTZ 25 mg as an outpatient  -Consider adding diuretic due to pitting bilateral edema  -F/u Cardiology recommendations

## 2019-08-19 NOTE — H&P ADULT - PROBLEM SELECTOR PLAN 2
-LLL consolidation seen on X ray. Decrease breath sounds in the bases.  -Anti-pseudomonal dose of Zosyn 4.5 g q6h  -F/u blood cultures  -Daily chest X ray  -F/U CBC with diff every morning

## 2019-08-19 NOTE — H&P ADULT - PROBLEM SELECTOR PLAN 6
-Patient takes tamulosin 0.4 as a home medication  -Consider starting if signs of urinary retention -S/p Seed procedure

## 2019-08-20 LAB
ANION GAP SERPL CALC-SCNC: 9 MMOL/L — SIGNIFICANT CHANGE UP (ref 5–17)
APTT BLD: 34.7 SEC — SIGNIFICANT CHANGE UP (ref 27.5–36.3)
BASOPHILS # BLD AUTO: 0 K/UL — SIGNIFICANT CHANGE UP (ref 0–0.2)
BASOPHILS NFR BLD AUTO: 0 % — SIGNIFICANT CHANGE UP (ref 0–2)
BUN SERPL-MCNC: 20 MG/DL — SIGNIFICANT CHANGE UP (ref 7–23)
CALCIUM SERPL-MCNC: 7.5 MG/DL — LOW (ref 8.4–10.5)
CHLORIDE SERPL-SCNC: 110 MMOL/L — HIGH (ref 96–108)
CO2 SERPL-SCNC: 23 MMOL/L — SIGNIFICANT CHANGE UP (ref 22–31)
CREAT SERPL-MCNC: 0.96 MG/DL — SIGNIFICANT CHANGE UP (ref 0.5–1.3)
EOSINOPHIL # BLD AUTO: 0 K/UL — SIGNIFICANT CHANGE UP (ref 0–0.5)
EOSINOPHIL NFR BLD AUTO: 0 % — SIGNIFICANT CHANGE UP (ref 0–6)
GLUCOSE BLDC GLUCOMTR-MCNC: 101 MG/DL — HIGH (ref 70–99)
GLUCOSE SERPL-MCNC: 149 MG/DL — HIGH (ref 70–99)
HCT VFR BLD CALC: 43.1 % — SIGNIFICANT CHANGE UP (ref 39–50)
HGB BLD-MCNC: 13.4 G/DL — SIGNIFICANT CHANGE UP (ref 13–17)
LEGIONELLA AG UR QL: POSITIVE
LYMPHOCYTES # BLD AUTO: 1.18 K/UL — SIGNIFICANT CHANGE UP (ref 1–3.3)
LYMPHOCYTES # BLD AUTO: 6.9 % — LOW (ref 13–44)
MAGNESIUM SERPL-MCNC: 2.1 MG/DL — SIGNIFICANT CHANGE UP (ref 1.6–2.6)
MCHC RBC-ENTMCNC: 30.6 PG — SIGNIFICANT CHANGE UP (ref 27–34)
MCHC RBC-ENTMCNC: 31.1 GM/DL — LOW (ref 32–36)
MCV RBC AUTO: 98.4 FL — SIGNIFICANT CHANGE UP (ref 80–100)
MONOCYTES # BLD AUTO: 0.89 K/UL — SIGNIFICANT CHANGE UP (ref 0–0.9)
MONOCYTES NFR BLD AUTO: 5.2 % — SIGNIFICANT CHANGE UP (ref 2–14)
MRSA PCR RESULT.: NEGATIVE — SIGNIFICANT CHANGE UP
NEUTROPHILS # BLD AUTO: 14.89 K/UL — HIGH (ref 1.8–7.4)
NEUTROPHILS NFR BLD AUTO: 87 % — HIGH (ref 43–77)
NRBC # BLD: SIGNIFICANT CHANGE UP /100 WBCS (ref 0–0)
PLATELET # BLD AUTO: 279 K/UL — SIGNIFICANT CHANGE UP (ref 150–400)
POTASSIUM SERPL-MCNC: 4.2 MMOL/L — SIGNIFICANT CHANGE UP (ref 3.5–5.3)
POTASSIUM SERPL-SCNC: 4.2 MMOL/L — SIGNIFICANT CHANGE UP (ref 3.5–5.3)
RBC # BLD: 4.38 M/UL — SIGNIFICANT CHANGE UP (ref 4.2–5.8)
RBC # FLD: 15.6 % — HIGH (ref 10.3–14.5)
S AUREUS DNA NOSE QL NAA+PROBE: POSITIVE
SODIUM SERPL-SCNC: 142 MMOL/L — SIGNIFICANT CHANGE UP (ref 135–145)
TSH SERPL-MCNC: 2.26 UIU/ML — SIGNIFICANT CHANGE UP (ref 0.35–4.94)
WBC # BLD: 17.11 K/UL — HIGH (ref 3.8–10.5)
WBC # FLD AUTO: 17.11 K/UL — HIGH (ref 3.8–10.5)

## 2019-08-20 PROCEDURE — 71045 X-RAY EXAM CHEST 1 VIEW: CPT | Mod: 26,77

## 2019-08-20 PROCEDURE — 93970 EXTREMITY STUDY: CPT | Mod: 26

## 2019-08-20 PROCEDURE — 93306 TTE W/DOPPLER COMPLETE: CPT | Mod: 26

## 2019-08-20 PROCEDURE — 99233 SBSQ HOSP IP/OBS HIGH 50: CPT | Mod: GC

## 2019-08-20 PROCEDURE — 93010 ELECTROCARDIOGRAM REPORT: CPT

## 2019-08-20 PROCEDURE — 71045 X-RAY EXAM CHEST 1 VIEW: CPT | Mod: 26

## 2019-08-20 RX ORDER — METOPROLOL TARTRATE 50 MG
25 TABLET ORAL ONCE
Refills: 0 | Status: COMPLETED | OUTPATIENT
Start: 2019-08-20 | End: 2019-08-20

## 2019-08-20 RX ORDER — HEPARIN SODIUM 5000 [USP'U]/ML
1100 INJECTION INTRAVENOUS; SUBCUTANEOUS
Qty: 25000 | Refills: 0 | Status: DISCONTINUED | OUTPATIENT
Start: 2019-08-20 | End: 2019-08-20

## 2019-08-20 RX ORDER — AZITHROMYCIN 500 MG/1
500 TABLET, FILM COATED ORAL DAILY
Refills: 0 | Status: DISCONTINUED | OUTPATIENT
Start: 2019-08-20 | End: 2019-08-22

## 2019-08-20 RX ORDER — ACETAMINOPHEN 500 MG
650 TABLET ORAL EVERY 6 HOURS
Refills: 0 | Status: DISCONTINUED | OUTPATIENT
Start: 2019-08-20 | End: 2019-08-22

## 2019-08-20 RX ORDER — ENOXAPARIN SODIUM 100 MG/ML
70 INJECTION SUBCUTANEOUS EVERY 12 HOURS
Refills: 0 | Status: DISCONTINUED | OUTPATIENT
Start: 2019-08-20 | End: 2019-08-23

## 2019-08-20 RX ORDER — AZITHROMYCIN 500 MG/1
500 TABLET, FILM COATED ORAL EVERY 24 HOURS
Refills: 0 | Status: DISCONTINUED | OUTPATIENT
Start: 2019-08-20 | End: 2019-08-20

## 2019-08-20 RX ADMIN — Medication 650 MILLIGRAM(S): at 14:00

## 2019-08-20 RX ADMIN — SODIUM CHLORIDE 1000 MILLILITER(S): 9 INJECTION INTRAMUSCULAR; INTRAVENOUS; SUBCUTANEOUS at 00:19

## 2019-08-20 RX ADMIN — PIPERACILLIN AND TAZOBACTAM 200 GRAM(S): 4; .5 INJECTION, POWDER, LYOPHILIZED, FOR SOLUTION INTRAVENOUS at 00:45

## 2019-08-20 RX ADMIN — HYDROXYUREA 500 MILLIGRAM(S): 500 CAPSULE ORAL at 05:42

## 2019-08-20 RX ADMIN — Medication 650 MILLIGRAM(S): at 02:51

## 2019-08-20 RX ADMIN — Medication 500000 UNIT(S): at 05:41

## 2019-08-20 RX ADMIN — AZITHROMYCIN 255 MILLIGRAM(S): 500 TABLET, FILM COATED ORAL at 10:01

## 2019-08-20 RX ADMIN — HEPARIN SODIUM 11 UNIT(S)/HR: 5000 INJECTION INTRAVENOUS; SUBCUTANEOUS at 01:33

## 2019-08-20 RX ADMIN — Medication 81 MILLIGRAM(S): at 11:18

## 2019-08-20 RX ADMIN — HYDROXYUREA 500 MILLIGRAM(S): 500 CAPSULE ORAL at 17:23

## 2019-08-20 RX ADMIN — Medication 500000 UNIT(S): at 00:45

## 2019-08-20 RX ADMIN — ENOXAPARIN SODIUM 70 MILLIGRAM(S): 100 INJECTION SUBCUTANEOUS at 17:22

## 2019-08-20 RX ADMIN — Medication 5 MILLIGRAM(S): at 02:02

## 2019-08-20 RX ADMIN — Medication 650 MILLIGRAM(S): at 01:46

## 2019-08-20 RX ADMIN — AZITHROMYCIN 500 MILLIGRAM(S): 500 TABLET, FILM COATED ORAL at 15:39

## 2019-08-20 RX ADMIN — Medication 650 MILLIGRAM(S): at 11:18

## 2019-08-20 RX ADMIN — Medication 25 MILLIGRAM(S): at 18:43

## 2019-08-20 RX ADMIN — Medication 500000 UNIT(S): at 17:22

## 2019-08-20 RX ADMIN — PIPERACILLIN AND TAZOBACTAM 200 GRAM(S): 4; .5 INJECTION, POWDER, LYOPHILIZED, FOR SOLUTION INTRAVENOUS at 05:41

## 2019-08-20 NOTE — PROGRESS NOTE ADULT - SUBJECTIVE AND OBJECTIVE BOX
INTERVAL HPI/OVERNIGHT EVENTS:  Patient was seen and examined at bedside. As per nurse and patient, no o/n events, patient resting comfortably. No complaints at this time. Patient denies: fever, chills, dizziness, weakness, HA, Changes in vision, CP, palpitations, SOB, cough, N/V/D/C, dysuria, changes in bowel movements, LE edema. ROS otherwise negative.    ICU Vital Signs Last 24 Hrs  T(C): 37.2 (20 Aug 2019 05:00), Max: 38.6 (20 Aug 2019 01:40)  T(F): 98.9 (20 Aug 2019 05:00), Max: 101.4 (20 Aug 2019 01:40)  HR: 88 (20 Aug 2019 04:00) (88 - 177)  BP: 117/62 (20 Aug 2019 04:00) (101/67 - 155/77)  BP(mean): 79 (20 Aug 2019 04:00) (79 - 108)  RR: 16 (20 Aug 2019 04:00) (14 - 18)  SpO2: 100% (20 Aug 2019 04:00) (92% - 100%)      PHYSICAL EXAM:    Constitutional: WDWN, NAD  HEENT: PERRL, EOMI, sclera non-icteric, neck supple, trachea midline, no masses, no JVD, MMM, good dentition  Respiratory: CTA b/l, good air entry b/l, no wheezing, no rhonchi, no rales, without accessory muscle use and no intercostal retractions  Cardiovascular: RRR, normal S1S2, no M/R/G  Gastrointestinal: soft, NTND, no masses palpable, BS normal  Extremities: Warm, well perfused, pulses equal bilateral upper and lower extremities, no edema, no clubbing  Neurological: AAOx3, CN Grossly intact  Skin: Normal temperature, warm, dry    MEDICATIONS  (STANDING):  aspirin enteric coated 81 milliGRAM(s) Oral daily  heparin  Infusion 1100 Unit(s)/Hr (11 mL/Hr) IV Continuous <Continuous>  hydroxyurea 500 milliGRAM(s) Oral every 12 hours  nystatin    Suspension 398795 Unit(s) Oral four times a day  piperacillin/tazobactam IVPB.. 4.5 Gram(s) IV Intermittent every 6 hours    MEDICATIONS  (PRN):  acetaminophen    Suspension .. 650 milliGRAM(s) Oral every 6 hours PRN Temp greater or equal to 38C (100.4F)  acetaminophen   Tablet .. 650 milliGRAM(s) Oral every 6 hours PRN Mild Pain (1 - 3)  calcium carbonate    500 mG (Tums) Chewable 3 Tablet(s) Chew every 6 hours PRN Dyspepsia  melatonin 5 milliGRAM(s) Oral at bedtime PRN Sleep  metoprolol tartrate Injectable 5 milliGRAM(s) IV Push every 6 hours PRN HR >120      Allergies    No Known Allergies    Intolerances        LABS:                        14.4   22.44 )-----------( 326      ( 19 Aug 2019 11:53 )             45.5     08-19    141  |  106  |  25<H>  ----------------------------<  160<H>  3.7   |  22  |  0.97    Ca    8.0<L>      19 Aug 2019 12:41    TPro  6.2  /  Alb  2.5<L>  /  TBili  0.8  /  DBili  x   /  AST  36  /  ALT  16  /  AlkPhos  54  08-19    PT/INR - ( 19 Aug 2019 12:41 )   PT: 20.3 sec;   INR: 1.77          PTT - ( 19 Aug 2019 12:41 )  PTT:29.2 sec  Urinalysis Basic - ( 19 Aug 2019 12:08 )    Color: x / Appearance: x / SG: x / pH: x  Gluc: x / Ketone: x  / Bili: x / Urobili: x   Blood: x / Protein: x / Nitrite: x   Leuk Esterase: x / RBC: > 10 /HPF / WBC < 5 /HPF   Sq Epi: x / Non Sq Epi: 0-5 /HPF / Bacteria: Present /HPF        RADIOLOGY & ADDITIONAL TESTS:  Reviewed INTERVAL HPI/OVERNIGHT EVENTS:  Patient was seen and examined at bedside. As per nurse and patient, no o/n events, patient resting comfortably. No complaints at this time. Patient denies: fever, chills, dizziness, weakness, HA, Changes in vision, CP, palpitations, SOB, cough, N/V/D/C, dysuria, changes in bowel movements, LE edema. ROS otherwise negative.    ICU Vital Signs Last 24 Hrs  T(C): 37.2 (20 Aug 2019 05:00), Max: 38.6 (20 Aug 2019 01:40)  T(F): 98.9 (20 Aug 2019 05:00), Max: 101.4 (20 Aug 2019 01:40)  HR: 88 (20 Aug 2019 04:00) (88 - 177)  BP: 117/62 (20 Aug 2019 04:00) (101/67 - 155/77)  BP(mean): 79 (20 Aug 2019 04:00) (79 - 108)  RR: 16 (20 Aug 2019 04:00) (14 - 18)  SpO2: 100% (20 Aug 2019 04:00) (92% - 100%)      PHYSICAL EXAM:    Constitutional: WDWN, NAD  HEENT: PERRL, EOMI, sclera non-icteric, neck supple, trachea midline, no masses, no JVD, MMM  Respiratory: CTA b/l, good air entry b/l, no wheezing, no rhonchi, no rales, without accessory muscle use and no intercostal retractions  Cardiovascular: RRR, normal S1S2, no M/R/G  Gastrointestinal: soft, NTND, no masses palpable, BS normal  Extremities: Warm, well perfused, pulses equal bilateral upper and lower extremities, no edema, no clubbing  Neurological: AAOx3, CN Grossly intact  Skin: Normal temperature, warm, dry    MEDICATIONS  (STANDING):  aspirin enteric coated 81 milliGRAM(s) Oral daily  heparin  Infusion 1100 Unit(s)/Hr (11 mL/Hr) IV Continuous <Continuous>  hydroxyurea 500 milliGRAM(s) Oral every 12 hours  nystatin    Suspension [id number] Unit(s) Oral four times a day  piperacillin/tazobactam IVPB.. 4.5 Gram(s) IV Intermittent every 6 hours    MEDICATIONS  (PRN):  acetaminophen    Suspension .. 650 milliGRAM(s) Oral every 6 hours PRN Temp greater or equal to 38C (100.4F)  acetaminophen   Tablet .. 650 milliGRAM(s) Oral every 6 hours PRN Mild Pain (1 - 3)  calcium carbonate    500 mG (Tums) Chewable 3 Tablet(s) Chew every 6 hours PRN Dyspepsia  melatonin 5 milliGRAM(s) Oral at bedtime PRN Sleep  metoprolol tartrate Injectable 5 milliGRAM(s) IV Push every 6 hours PRN HR >120      Allergies    No Known Allergies    Intolerances        	     LABS:                         13.4   17.11 )-----------( 279      ( 20 Aug 2019 07:37 )             43.1     08-20    142  |  110<H>  |  20  ----------------------------<  149<H>  4.2   |  23  |  0.96    Ca    7.5<L>      20 Aug 2019 07:37  Mg     2.1     08-20    TPro  6.2  /  Alb  2.5<L>  /  TBili  0.8  /  DBili  x   /  AST  36  /  ALT  16  /  AlkPhos  54  08-19    PT/INR - ( 19 Aug 2019 12:41 )   PT: 20.3 sec;   INR: 1.77          PTT - ( 20 Aug 2019 07:37 )  PTT:34.7 sec  Urinalysis Basic - ( 19 Aug 2019 12:08 )    Color: x / Appearance: x / SG: x / pH: x  Gluc: x / Ketone: x  / Bili: x / Urobili: x   Blood: x / Protein: x / Nitrite: x   Leuk Esterase: x / RBC: > 10 /HPF / WBC < 5 /HPF   Sq Epi: x / Non Sq Epi: 0-5 /HPF / Bacteria: Present /HPF      CARDIAC MARKERS ( 19 Aug 2019 12:41 )  x     / <0.01 ng/mL / 536 U/L / x     / 9.1 ng/mL            RADIOLOGY, EKG & ADDITIONAL TESTS: Reviewed.

## 2019-08-20 NOTE — PROGRESS NOTE ADULT - PROBLEM SELECTOR PLAN 3
-Afib with RVR  -Not on anticoagulation as an outpatient due to history of GI bleeds  -Continue monitoring on telemetry   - no ischemic changes on EKG  - trops negative x1   - patient with significant BL pitting edema 4+, no other signs of overload, no previous echo  - f/up echocardiogram   - Consider consult cardiology   - obtain collateral from PCP Dr. Nolen -Afib with RVR  -Not on anticoagulation as an outpatient due to history of GI bleeds  -Continue monitoring on telemetry   - no ischemic changes on EKG  - trops negative x1   - patient with significant BL pitting edema 4+, no other signs of overload, no previous echo  - f/up echocardiogram   -F/u Cards recs  -Heparin started on 8/20 DC due to access issues. Lovenox q12 started 1 mg/kg (70 mg q 12)   - obtain collateral from PCP Dr. Nolen

## 2019-08-20 NOTE — PROGRESS NOTE ADULT - ASSESSMENT
A 84yo M with PMH of multiple myeloma on Chemotherapy(last tx Friday), Prostate Cancer(s/p SEED therapy), paroxsymal Afib (not an AC due to Hx of GIB), Recurrent DVTs, presents with symptoms of lethargy for one day; meeting severe sepsis criteria on admission likely 2/2 CAP along with Afib RVR A 84yo M with PMH of multiple myeloma on Chemotherapy(last tx Friday), Prostate Cancer(s/p SEED therapy), paroxsymal Afib (not an AC due to Hx of GIB), Recurrent DVTs, presents with symptoms of lethargy for one day; meeting severe sepsis criteria on admission likely 2/2 CAP along with Afib RVR, now with Legionella pneumonia.

## 2019-08-20 NOTE — CONSULT NOTE ADULT - SUBJECTIVE AND OBJECTIVE BOX
Medicine requested a central venous catheter to accommodate therapy for the sepsis and pneumonia.  The history of multiple myeloma is noted along with the cxr and data. On ultrasound exam, the right int jugular vein is normal. The   procedure was explained and consent was obtained.

## 2019-08-20 NOTE — PROCEDURE NOTE - NSPOSTCAREGUIDE_GEN_A_CORE
Care for catheter as per unit/ICU protocols/Instructed patient/caregiver to follow-up with primary care physician/Keep the cast/splint/dressing clean and dry/Instructed patient/caregiver regarding signs and symptoms of infection/Verbal/written post procedure instructions were given to patient/caregiver
Care for catheter as per unit/ICU protocols

## 2019-08-20 NOTE — PROCEDURE NOTE - NSPROCDETAILS_GEN_ALL_CORE
blood seen on insertion/flushes easily/secured in place/dressing applied/ultrasound utilization
guidewire recovered/sterile technique, catheter placed/lumen(s) aspirated and flushed/ultrasound guidance/sterile dressing applied

## 2019-08-20 NOTE — PROGRESS NOTE ADULT - PROBLEM SELECTOR PLAN 4
-Receiving treatment as an outpatient. 2 weeks on 1 week off, per wife.  -On Decadron as an outpatient  -Obtain collateral from hematology oncology

## 2019-08-20 NOTE — PROGRESS NOTE ADULT - PROBLEM SELECTOR PLAN 1
-likely secondary to pneumonia. Focal consolidation in LLL on CXR  Pt meeting 3/4 sepsis criteria given fever, tachycardia and leukocytosis along with elevated lactate. CXR with LLL infiltrate; likley CAP given no recent hospitalization, no signs//symptoms of aspiration. However given recent chemotherapy and immunocompromised status will cover for nosocomial infection including pseudomonas.   - s/p 2.5L NS bolus   - Lactate trended: 3.0 to 1.4  - s/p Vanc and Zosyn in ED, will continue with zosyn anti-pseudomonal dose  - UA negative  - f/up blood cultures -likely secondary to pneumonia. Focal consolidation in LLL on CXR  Pt meeting 3/4 sepsis criteria given fever, tachycardia and leukocytosis along with elevated lactate. CXR with LLL infiltrate; likley CAP given no recent hospitalization, no signs//symptoms of aspiration. However given recent chemotherapy and immunocompromised status will cover for nosocomial infection including pseudomonas.   - s/p 2.5L NS bolus   - Lactate trended: 3.0 to 1.4  - s/p Vanc and Zosyn in ED  - UA negative  -Legionella Antigen Positive. Azithromycin started  -DC Zosyn

## 2019-08-21 DIAGNOSIS — I82.499 ACUTE EMBOLISM AND THROMBOSIS OF OTHER SPECIFIED DEEP VEIN OF UNSPECIFIED LOWER EXTREMITY: ICD-10-CM

## 2019-08-21 LAB
-  AMPICILLIN/SULBACTAM: SIGNIFICANT CHANGE UP
-  AMPICILLIN: SIGNIFICANT CHANGE UP
-  CEFAZOLIN: SIGNIFICANT CHANGE UP
-  CEFTRIAXONE: SIGNIFICANT CHANGE UP
-  GENTAMICIN: SIGNIFICANT CHANGE UP
-  NITROFURANTOIN: SIGNIFICANT CHANGE UP
-  PIPERACILLIN/TAZOBACTAM: SIGNIFICANT CHANGE UP
-  TOBRAMYCIN: SIGNIFICANT CHANGE UP
-  TRIMETHOPRIM/SULFAMETHOXAZOLE: SIGNIFICANT CHANGE UP
ANION GAP SERPL CALC-SCNC: 8 MMOL/L — SIGNIFICANT CHANGE UP (ref 5–17)
BASOPHILS # BLD AUTO: 0.02 K/UL — SIGNIFICANT CHANGE UP (ref 0–0.2)
BASOPHILS NFR BLD AUTO: 0.2 % — SIGNIFICANT CHANGE UP (ref 0–2)
BUN SERPL-MCNC: 16 MG/DL — SIGNIFICANT CHANGE UP (ref 7–23)
CALCIUM SERPL-MCNC: 7.6 MG/DL — LOW (ref 8.4–10.5)
CHLORIDE SERPL-SCNC: 108 MMOL/L — SIGNIFICANT CHANGE UP (ref 96–108)
CO2 SERPL-SCNC: 23 MMOL/L — SIGNIFICANT CHANGE UP (ref 22–31)
CREAT SERPL-MCNC: 0.75 MG/DL — SIGNIFICANT CHANGE UP (ref 0.5–1.3)
CULTURE RESULTS: SIGNIFICANT CHANGE UP
EOSINOPHIL # BLD AUTO: 0.01 K/UL — SIGNIFICANT CHANGE UP (ref 0–0.5)
EOSINOPHIL NFR BLD AUTO: 0.1 % — SIGNIFICANT CHANGE UP (ref 0–6)
GLUCOSE SERPL-MCNC: 122 MG/DL — HIGH (ref 70–99)
HCT VFR BLD CALC: 44.4 % — SIGNIFICANT CHANGE UP (ref 39–50)
HGB BLD-MCNC: 13.7 G/DL — SIGNIFICANT CHANGE UP (ref 13–17)
IMM GRANULOCYTES NFR BLD AUTO: 1 % — SIGNIFICANT CHANGE UP (ref 0–1.5)
LYMPHOCYTES # BLD AUTO: 1.54 K/UL — SIGNIFICANT CHANGE UP (ref 1–3.3)
LYMPHOCYTES # BLD AUTO: 12.7 % — LOW (ref 13–44)
MAGNESIUM SERPL-MCNC: 2.2 MG/DL — SIGNIFICANT CHANGE UP (ref 1.6–2.6)
MCHC RBC-ENTMCNC: 30.9 GM/DL — LOW (ref 32–36)
MCHC RBC-ENTMCNC: 31.1 PG — SIGNIFICANT CHANGE UP (ref 27–34)
MCV RBC AUTO: 100.7 FL — HIGH (ref 80–100)
METHOD TYPE: SIGNIFICANT CHANGE UP
MONOCYTES # BLD AUTO: 1.13 K/UL — HIGH (ref 0–0.9)
MONOCYTES NFR BLD AUTO: 9.3 % — SIGNIFICANT CHANGE UP (ref 2–14)
NEUTROPHILS # BLD AUTO: 9.32 K/UL — HIGH (ref 1.8–7.4)
NEUTROPHILS NFR BLD AUTO: 76.7 % — SIGNIFICANT CHANGE UP (ref 43–77)
NRBC # BLD: 0 /100 WBCS — SIGNIFICANT CHANGE UP (ref 0–0)
ORGANISM # SPEC MICROSCOPIC CNT: SIGNIFICANT CHANGE UP
ORGANISM # SPEC MICROSCOPIC CNT: SIGNIFICANT CHANGE UP
PHOSPHATE SERPL-MCNC: 1.6 MG/DL — LOW (ref 2.5–4.5)
PLATELET # BLD AUTO: 227 K/UL — SIGNIFICANT CHANGE UP (ref 150–400)
POTASSIUM SERPL-MCNC: 4.5 MMOL/L — SIGNIFICANT CHANGE UP (ref 3.5–5.3)
POTASSIUM SERPL-SCNC: 4.5 MMOL/L — SIGNIFICANT CHANGE UP (ref 3.5–5.3)
RBC # BLD: 4.41 M/UL — SIGNIFICANT CHANGE UP (ref 4.2–5.8)
RBC # FLD: 15.9 % — HIGH (ref 10.3–14.5)
SODIUM SERPL-SCNC: 139 MMOL/L — SIGNIFICANT CHANGE UP (ref 135–145)
SPECIMEN SOURCE: SIGNIFICANT CHANGE UP
WBC # BLD: 12.14 K/UL — HIGH (ref 3.8–10.5)
WBC # FLD AUTO: 12.14 K/UL — HIGH (ref 3.8–10.5)

## 2019-08-21 PROCEDURE — 99233 SBSQ HOSP IP/OBS HIGH 50: CPT | Mod: GC

## 2019-08-21 PROCEDURE — 71045 X-RAY EXAM CHEST 1 VIEW: CPT | Mod: 26

## 2019-08-21 RX ORDER — FUROSEMIDE 40 MG
20 TABLET ORAL ONCE
Refills: 0 | Status: COMPLETED | OUTPATIENT
Start: 2019-08-21 | End: 2019-08-21

## 2019-08-21 RX ORDER — METOPROLOL TARTRATE 50 MG
25 TABLET ORAL
Refills: 0 | Status: DISCONTINUED | OUTPATIENT
Start: 2019-08-21 | End: 2019-08-28

## 2019-08-21 RX ADMIN — ENOXAPARIN SODIUM 70 MILLIGRAM(S): 100 INJECTION SUBCUTANEOUS at 06:50

## 2019-08-21 RX ADMIN — Medication 500000 UNIT(S): at 06:50

## 2019-08-21 RX ADMIN — Medication 25 MILLIGRAM(S): at 18:13

## 2019-08-21 RX ADMIN — Medication 25 MILLIGRAM(S): at 10:14

## 2019-08-21 RX ADMIN — Medication 500000 UNIT(S): at 11:42

## 2019-08-21 RX ADMIN — Medication 100 UNIT(S): at 18:11

## 2019-08-21 RX ADMIN — HYDROXYUREA 500 MILLIGRAM(S): 500 CAPSULE ORAL at 06:50

## 2019-08-21 RX ADMIN — Medication 62.5 MILLIMOLE(S): at 16:25

## 2019-08-21 RX ADMIN — Medication 20 MILLIGRAM(S): at 10:14

## 2019-08-21 RX ADMIN — AZITHROMYCIN 500 MILLIGRAM(S): 500 TABLET, FILM COATED ORAL at 11:42

## 2019-08-21 RX ADMIN — Medication 81 MILLIGRAM(S): at 11:42

## 2019-08-21 RX ADMIN — ENOXAPARIN SODIUM 70 MILLIGRAM(S): 100 INJECTION SUBCUTANEOUS at 18:13

## 2019-08-21 RX ADMIN — Medication 500000 UNIT(S): at 00:00

## 2019-08-21 RX ADMIN — HYDROXYUREA 500 MILLIGRAM(S): 500 CAPSULE ORAL at 18:13

## 2019-08-21 RX ADMIN — Medication 500000 UNIT(S): at 18:13

## 2019-08-21 NOTE — PROGRESS NOTE ADULT - SUBJECTIVE AND OBJECTIVE BOX
INTERVAL HPI/OVERNIGHT EVENTS:  Patient was seen and examined at bedside. As per nurse and patient, no o/n events, patient resting comfortably. No complaints at this time. Patient denies: fever, chills, dizziness, weakness, HA, Changes in vision, CP, palpitations, SOB, cough, N/V/D/C, dysuria, changes in bowel movements, LE edema. ROS otherwise negative.    ICU Vital Signs Last 24 Hrs  T(C): 37 (21 Aug 2019 05:27), Max: 37 (21 Aug 2019 05:27)  T(F): 98.6 (21 Aug 2019 05:27), Max: 98.6 (21 Aug 2019 05:27)  HR: 92 (21 Aug 2019 04:39) (90 - 96)  BP: 103/65 (21 Aug 2019 04:39) (103/65 - 149/68)  BP(mean): 80 (21 Aug 2019 04:39) (80 - 107)  RR: 18 (21 Aug 2019 04:39) (16 - 18)  SpO2: 100% (21 Aug 2019 04:39) (81% - 100%)      08-19 @ 07:01  -  08-20 @ 07:00  --------------------------------------------------------  IN: 66 mL / OUT: 600 mL / NET: -534 mL    08-20 @ 07:01 - 08-21 @ 06:07  --------------------------------------------------------  IN: 0 mL / OUT: 400 mL / NET: -400 mL            PHYSICAL EXAM:    Constitutional: WDWN, NAD  HEENT: PERRL, EOMI, sclera non-icteric, neck supple, trachea midline, no masses, no JVD, MMM  Respiratory: CTA b/l, good air entry b/l, no wheezing, no rhonchi, no rales, without accessory muscle use and no intercostal retractions  Cardiovascular: RRR, normal S1S2, no M/R/G  Gastrointestinal: soft, NTND, no masses palpable, BS normal  Extremities: Warm, well perfused, pulses equal bilateral upper and lower extremities, no edema, no clubbing  Neurological: AAOx3, CN Grossly intact  Skin: Normal temperature, warm, dry    MEDICATIONS  (STANDING):  aspirin enteric coated 81 milliGRAM(s) Oral daily  heparin  Infusion 1100 Unit(s)/Hr (11 mL/Hr) IV Continuous <Continuous>  hydroxyurea 500 milliGRAM(s) Oral every 12 hours  nystatin    Suspension 095074 Unit(s) Oral four times a day  piperacillin/tazobactam IVPB.. 4.5 Gram(s) IV Intermittent every 6 hours    MEDICATIONS  (PRN):  acetaminophen    Suspension .. 650 milliGRAM(s) Oral every 6 hours PRN Temp greater or equal to 38C (100.4F)  acetaminophen   Tablet .. 650 milliGRAM(s) Oral every 6 hours PRN Mild Pain (1 - 3)  calcium carbonate    500 mG (Tums) Chewable 3 Tablet(s) Chew every 6 hours PRN Dyspepsia  melatonin 5 milliGRAM(s) Oral at bedtime PRN Sleep  metoprolol tartrate Injectable 5 milliGRAM(s) IV Push every 6 hours PRN HR >120      Allergies    No Known Allergies    Intolerances        	     LABS:                         13.4   17.11 )-----------( 279      ( 20 Aug 2019 07:37 )             43.1     08-20    142  |  110<H>  |  20  ----------------------------<  149<H>  4.2   |  23  |  0.96    Ca    7.5<L>      20 Aug 2019 07:37  Mg     2.1     08-20    TPro  6.2  /  Alb  2.5<L>  /  TBili  0.8  /  DBili  x   /  AST  36  /  ALT  16  /  AlkPhos  54  08-19    PT/INR - ( 19 Aug 2019 12:41 )   PT: 20.3 sec;   INR: 1.77          PTT - ( 20 Aug 2019 07:37 )  PTT:34.7 sec  Urinalysis Basic - ( 19 Aug 2019 12:08 )    Color: x / Appearance: x / SG: x / pH: x  Gluc: x / Ketone: x  / Bili: x / Urobili: x   Blood: x / Protein: x / Nitrite: x   Leuk Esterase: x / RBC: > 10 /HPF / WBC < 5 /HPF   Sq Epi: x / Non Sq Epi: 0-5 /HPF / Bacteria: Present /HPF      CARDIAC MARKERS ( 19 Aug 2019 12:41 )  x     / <0.01 ng/mL / 536 U/L / x     / 9.1 ng/mL            RADIOLOGY, EKG & ADDITIONAL TESTS: Reviewed. INTERVAL HPI/OVERNIGHT EVENTS:  NAEON. Mr. Daniels endorses pain at his central line site.     ICU Vital Signs Last 24 Hrs  T(C): 37 (21 Aug 2019 05:27), Max: 37 (21 Aug 2019 05:27)  T(F): 98.6 (21 Aug 2019 05:27), Max: 98.6 (21 Aug 2019 05:27)  HR: 92 (21 Aug 2019 04:39) (90 - 96)  BP: 103/65 (21 Aug 2019 04:39) (103/65 - 149/68)  BP(mean): 80 (21 Aug 2019 04:39) (80 - 107)  RR: 18 (21 Aug 2019 04:39) (16 - 18)  SpO2: 100% (21 Aug 2019 04:39) (81% - 100%)      08-19 @ 07:01  -  08-20 @ 07:00  --------------------------------------------------------  IN: 66 mL / OUT: 600 mL / NET: -534 mL    08-20 @ 07:01  -  08-21 @ 06:07  --------------------------------------------------------  IN: 0 mL / OUT: 400 mL / NET: -400 mL            PHYSICAL EXAM:    Constitutional: WDWN, NAD  HEENT: Central Line in place on left, no erythema  Respiratory: Diminished breath sounds at both lung bases, no increased work of breathing  Cardiovascular: Irregularly irregular, Normal S1/S2, increased PMI, no murmurs  Gastrointestinal: soft, NTND, no masses palpable, BS normal  Extremities: 3+pitting edema in bilateral lower extremity  Neurological: AAOx1, CN Grossly intact  Skin: Normal temperature, warm, dry    MEDICATIONS  (STANDING):  aspirin enteric coated 81 milliGRAM(s) Oral daily  heparin  Infusion 1100 Unit(s)/Hr (11 mL/Hr) IV Continuous <Continuous>  hydroxyurea 500 milliGRAM(s) Oral every 12 hours  nystatin    Suspension 153802 Unit(s) Oral four times a day  piperacillin/tazobactam IVPB.. 4.5 Gram(s) IV Intermittent every 6 hours    MEDICATIONS  (PRN):  acetaminophen    Suspension .. 650 milliGRAM(s) Oral every 6 hours PRN Temp greater or equal to 38C (100.4F)  acetaminophen   Tablet .. 650 milliGRAM(s) Oral every 6 hours PRN Mild Pain (1 - 3)  calcium carbonate    500 mG (Tums) Chewable 3 Tablet(s) Chew every 6 hours PRN Dyspepsia  melatonin 5 milliGRAM(s) Oral at bedtime PRN Sleep  metoprolol tartrate Injectable 5 milliGRAM(s) IV Push every 6 hours PRN HR >120      Allergies    No Known Allergies    Intolerances        	     LABS:                         13.7   12.14 )-----------( 227      ( 21 Aug 2019 07:54 )             44.4     08-21    139  |  108  |  16  ----------------------------<  122<H>  4.5   |  23  |  0.75    Ca    7.6<L>      21 Aug 2019 07:54  Phos  1.6     08-21  Mg     2.2     08-21      PTT - ( 20 Aug 2019 07:37 )  PTT: 34.7 sec              RADIOLOGY, EKG & ADDITIONAL TESTS: Reviewed. INTERVAL HPI/OVERNIGHT EVENTS:  NAEON. Mr. Daniels endorses pain at his central line site.     ICU Vital Signs Last 24 Hrs  T(C): 37 (21 Aug 2019 05:27), Max: 37 (21 Aug 2019 05:27)  T(F): 98.6 (21 Aug 2019 05:27), Max: 98.6 (21 Aug 2019 05:27)  HR: 92 (21 Aug 2019 04:39) (90 - 96)  BP: 103/65 (21 Aug 2019 04:39) (103/65 - 149/68)  BP(mean): 80 (21 Aug 2019 04:39) (80 - 107)  RR: 18 (21 Aug 2019 04:39) (16 - 18)  SpO2: 100% (21 Aug 2019 04:39) (81% - 100%)      08-19 @ 07:01  -  08-20 @ 07:00  --------------------------------------------------------  IN: 66 mL / OUT: 600 mL / NET: -534 mL    08-20 @ 07:01  -  08-21 @ 06:07  --------------------------------------------------------  IN: 0 mL / OUT: 400 mL / NET: -400 mL      PHYSICAL EXAM:    Constitutional: WDWN, NAD  HEENT: Central Line in place on left, no erythema  Respiratory: Diminished breath sounds at both lung bases, no increased work of breathing  Cardiovascular: Irregularly irregular, Normal S1/S2, increased PMI, no murmurs  Gastrointestinal: soft, NTND, no masses palpable, BS normal  Extremities: 3+pitting edema in bilateral lower extremity  Neurological: AAOx1, CN Grossly intact  Skin: Normal temperature, warm, dry    MEDICATIONS  (STANDING):  aspirin enteric coated 81 milliGRAM(s) Oral daily  heparin  Infusion 1100 Unit(s)/Hr (11 mL/Hr) IV Continuous <Continuous>  hydroxyurea 500 milliGRAM(s) Oral every 12 hours  nystatin    Suspension 299858 Unit(s) Oral four times a day  piperacillin/tazobactam IVPB.. 4.5 Gram(s) IV Intermittent every 6 hours    MEDICATIONS  (PRN):  acetaminophen    Suspension .. 650 milliGRAM(s) Oral every 6 hours PRN Temp greater or equal to 38C (100.4F)  acetaminophen   Tablet .. 650 milliGRAM(s) Oral every 6 hours PRN Mild Pain (1 - 3)  calcium carbonate    500 mG (Tums) Chewable 3 Tablet(s) Chew every 6 hours PRN Dyspepsia  melatonin 5 milliGRAM(s) Oral at bedtime PRN Sleep  metoprolol tartrate Injectable 5 milliGRAM(s) IV Push every 6 hours PRN HR >120    Allergies    No Known Allergies    Intolerances  	     LABS:                         13.7   12.14 )-----------( 227      ( 21 Aug 2019 07:54 )             44.4     08-21    139  |  108  |  16  ----------------------------<  122<H>  4.5   |  23  |  0.75    Ca    7.6<L>      21 Aug 2019 07:54  Phos  1.6     08-21  Mg     2.2     08-21      PTT - ( 20 Aug 2019 07:37 )  PTT: 34.7 sec              RADIOLOGY, EKG & ADDITIONAL TESTS: Reviewed. Hospital Course:  Mr. Chicas is an 83 year old male with a PMHx of multiple myeloma on Chemotherapy(last tx Friday), Prostate Cancer(s/p SEED therapy), paroxsymal Afib (not an AC due to Hx of GIB), Recurrent DVTs, who presented with symptoms of lethargy for one day and met severe sepsis criteria on admission likely 2/2 CAP along with Afib RVR, now with Legionella pneumonia. On 8/19, patient febrile to 101.2. Patient started on vanc/zosyn for sepsis and heparin drip for history of recurrent DVTs. Vanc DCed. Patient found to be in SVT/afib rvr and given 5 mg lopressor. On 8/20, legionella urine antigen was positive. Zosyn was d/c and Azithromycin 500 mg IV started. TTE and US doppler of LE performed. Patient refused left leg doppler. Patient changed to full dose lovenox for high suspicion of DVT/PE. RIJ placed by Dr. Bajwa.         INTERVAL HPI/OVERNIGHT EVENTS: NAEON  ULI. Mr. Daniels endorses pain at his central line site.     ICU Vital Signs Last 24 Hrs  T(C): 37 (21 Aug 2019 05:27), Max: 37 (21 Aug 2019 05:27)  T(F): 98.6 (21 Aug 2019 05:27), Max: 98.6 (21 Aug 2019 05:27)  HR: 92 (21 Aug 2019 04:39) (90 - 96)  BP: 103/65 (21 Aug 2019 04:39) (103/65 - 149/68)  BP(mean): 80 (21 Aug 2019 04:39) (80 - 107)  RR: 18 (21 Aug 2019 04:39) (16 - 18)  SpO2: 100% (21 Aug 2019 04:39) (81% - 100%)      08-19 @ 07:01  -  08-20 @ 07:00  --------------------------------------------------------  IN: 66 mL / OUT: 600 mL / NET: -534 mL    08-20 @ 07:01  -  08-21 @ 06:07  --------------------------------------------------------  IN: 0 mL / OUT: 400 mL / NET: -400 mL      PHYSICAL EXAM:    Constitutional: WDWN, NAD  HEENT: Central Line in place on left, no erythema  Respiratory: Diminished breath sounds at both lung bases, no increased work of breathing  Cardiovascular: Irregularly irregular, Normal S1/S2, increased PMI, no murmurs  Gastrointestinal: soft, NTND, no masses palpable, BS normal  Extremities: 3+pitting edema in bilateral lower extremity  Neurological: AAOx1, CN Grossly intact  Skin: Normal temperature, warm, dry    MEDICATIONS  (STANDING):  aspirin enteric coated 81 milliGRAM(s) Oral daily  heparin  Infusion 1100 Unit(s)/Hr (11 mL/Hr) IV Continuous <Continuous>  hydroxyurea 500 milliGRAM(s) Oral every 12 hours  nystatin    Suspension 540197 Unit(s) Oral four times a day  piperacillin/tazobactam IVPB.. 4.5 Gram(s) IV Intermittent every 6 hours    MEDICATIONS  (PRN):  acetaminophen    Suspension .. 650 milliGRAM(s) Oral every 6 hours PRN Temp greater or equal to 38C (100.4F)  acetaminophen   Tablet .. 650 milliGRAM(s) Oral every 6 hours PRN Mild Pain (1 - 3)  calcium carbonate    500 mG (Tums) Chewable 3 Tablet(s) Chew every 6 hours PRN Dyspepsia  melatonin 5 milliGRAM(s) Oral at bedtime PRN Sleep  metoprolol tartrate Injectable 5 milliGRAM(s) IV Push every 6 hours PRN HR >120    Allergies    No Known Allergies    Intolerances  	     LABS:                         13.7   12.14 )-----------( 227      ( 21 Aug 2019 07:54 )             44.4     08-21    139  |  108  |  16  ----------------------------<  122<H>  4.5   |  23  |  0.75    Ca    7.6<L>      21 Aug 2019 07:54  Phos  1.6     08-21  Mg     2.2     08-21      PTT - ( 20 Aug 2019 07:37 )  PTT: 34.7 sec              RADIOLOGY, EKG & ADDITIONAL TESTS: Reviewed.

## 2019-08-21 NOTE — PROGRESS NOTE ADULT - PROBLEM SELECTOR PLAN 4
-Afib with RVR  -Not on anticoagulation as an outpatient due to history of GI bleeds  -Continue monitoring on telemetry   - no ischemic changes on EKG  - trops negative x1   - patient with significant BL pitting edema 4+, no other signs of overload, no previous echo  - f/up echocardiogram   -F/u Cards recs  - obtain collateral from PCP Dr. Nolen -Afib with RVR  -Not on anticoagulation as an outpatient due to history of GI bleeds  - no ischemic changes on EKG  - trops negative x1   - patient with significant BL pitting edema 3+, no other signs of overload,   - Echo showed ef 60 %  - F/u Cards recs  - obtain collateral from PCP Dr. Nolen -Patient has history of recurrent DVT  -Heparin started on 8/20 DC due to access issues. Lovenox q12 started 1 mg/kg (70 mg q 12)   -Treated with Lovenox for high suspicion of PE/DVT  -Doppler studies need to be repeated

## 2019-08-21 NOTE — PROGRESS NOTE ADULT - PROBLEM SELECTOR PLAN 4
-Patient has history of recurrent DVT  -High Suspicion for thromboembolic event given  -Heparin started on 8/20 DC due to access issues. Lovenox q12 started 1 mg/kg (70 mg q 12)   -Treated with Lovenox for high suspicion of PE/DVT  -F/U Doppler studies -Afib with RVR  -Not on anticoagulation as an outpatient due to history of GI bleeds  -Continue monitoring on telemetry   - no ischemic changes on EKG  - trops negative x1   - patient with significant BL pitting edema 4+, no other signs of overload, no previous echo  - f/up echocardiogram   -F/u Cards recs  - obtain collateral from PCP Dr. Nolen

## 2019-08-21 NOTE — PROGRESS NOTE ADULT - PROBLEM SELECTOR PLAN 2
-LLL consolidation seen on X ray. Decrease breath sounds in the bases.  -Legionella PNA, Azithromycin started 8/20  -F/u blood cultures  -Daily chest X ray  -F/U CBC with diff every morning -LLL consolidation seen on X ray. Decrease breath sounds in the bases.  -Legionella PNA, Azithromycin started 8/20  -F/u blood cultures  -Daily chest X ray; somewhat improved from admission  -F/U CBC with diff every morning

## 2019-08-21 NOTE — PROGRESS NOTE ADULT - PROBLEM SELECTOR PLAN 10
F: S/p 2.5 L in ED  E: Replete K <3.5 and Mg > 2  N: Dash F: S/p 2.5 L in ED  E: Replete K <3.5 and Mg > 2  N: Dash    1) PCP Contacted on Admission: (Y/N) --> Name & Phone #:  2) Date of Contact with PCP:  3) PCP Contacted at Discharge: (Y/N)  4) Summary of Handoff Given to PCP:   5) Post-Discharge Appointment Date and Location:

## 2019-08-21 NOTE — PROGRESS NOTE ADULT - PROBLEM SELECTOR PLAN 2
-LLL consolidation seen on X ray.    -Legionella PNA, Azithromycin started 8/20 and will continue for 10 days.   -F/u blood cultures  -Daily chest X ray; somewhat improved from admission  -F/U CBC with diff every morning

## 2019-08-21 NOTE — PROGRESS NOTE ADULT - PROBLEM SELECTOR PLAN 3
-Patient has history of recurrent DVT  -Heparin started on 8/20 DC due to access issues. Lovenox q12 started 1 mg/kg (70 mg q 12)   -Treated with Lovenox for high suspicion of PE/DVT  -F/U Doppler studies -Patient has history of recurrent DVT  -Heparin started on 8/20 DC due to access issues. Lovenox q12 started 1 mg/kg (70 mg q 12)   -Treated with Lovenox for high suspicion of PE/DVT  -Doppler studies need to be repeated -Afib with RVR  -Not on anticoagulation as an outpatient due to history of GI bleeds  - no ischemic changes on EKG  - trops negative x1   - patient with significant BL pitting edema 3+, no other signs of overload,   - Echo showed ef 60 %  - F/u Cards recs  - obtain collateral from PCP Dr. Nolen

## 2019-08-21 NOTE — PROGRESS NOTE ADULT - PROBLEM SELECTOR PLAN 1
-RESOLVED  -likely secondary to pneumonia. Focal consolidation in LLL on CXR. Met sepsis criteria given fever, tachycardia and leukocytosis along with elevated lactate. CXR with LLL infiltrate; likley CAP given no recent hospitalization, no signs//symptoms of aspiration. However given recent chemotherapy and immunocompromised status will cover for nosocomial infection including pseudomonas.   - s/p 2.5L NS bolus in ED  - Lactate trended: 3.0 to 1.4  - s/p Vanc and Zosyn in ED  - UA negative  - Legionella Antigen Positive. Azithromycin started 8/20 and will continue for 10 days (8/20-8/30)

## 2019-08-21 NOTE — PROGRESS NOTE ADULT - SUBJECTIVE AND OBJECTIVE BOX
Transfer note acceptance from [facility] to [facility] [[email or web address]     Hospital Course:  83 year old male with a PMHx of multiple myeloma on Chemotherapy(last tx Friday), Prostate Cancer(s/p SEED therapy), paroxsymal Afib (not an AC due to Hx of GIB), Recurrent DVTs, who presented with symptoms of lethargy for one day and met severe sepsis criteria on admission, vanc/zosyn started for sepsis and was eventually discontinued after legionella urine antigen was positive on 08/20. Now on azithromycin 500 mg IV. Due to history of recurrent DVTs, was started on heparin gtt. Patient was found to be in SVT/afib rvr and given 5 mg Lopressor on 08/19. TTE and US doppler of LE performed. Patient refused left leg doppler. Patient changed to full dose lovenox for high suspicion of DVT/PE. RIJ placed. Now stable for transfer to the regional medical floor.         INTERVAL HPI/OVERNIGHT EVENTS:    REVIEW OF SYSTEMS:  CONSTITUTIONAL: No fever, weight loss, or fatigue  EYES: No eye pain, visual disturbances, or discharge  ENMT:  No difficulty hearing, tinnitus, vertigo; No sinus or throat pain  NECK: No pain or stiffness  RESPIRATORY: No cough, wheezing, chills or hemoptysis; No shortness of breath  CARDIOVASCULAR: No chest pain, palpitations, dizziness, or leg swelling  GASTROINTESTINAL: No abdominal or epigastric pain. No diarrhea or constipation. No nausea, vomiting, or hematemesis. No melena or hematochezia.  GENITOURINARY: No dysuria, frequency, hematuria, or incontinence  NEUROLOGICAL: No headaches, memory loss, loss of strength, numbness, or tremors  MUSCULOSKELETAL: No joint pain or swelling; No muscle, back, or extremity pain  SKIN: No itching, burning, rashes, or lesions   LYMPH NODES: No enlarged glands  ENDOCRINE: No heat or cold intolerance; No hair loss  PSYCHIATRIC: No depression, anxiety, mood swings, or difficulty sleeping  HEME/LYMPH: No easy bruising, or bleeding gums  ALLERY AND IMMUNOLOGIC: No hives or eczema    Vital Signs Last 24 Hrs  T(C): 36.9 (21 Aug 2019 21:35), Max: 37.4 (21 Aug 2019 13:33)  T(F): 98.4 (21 Aug 2019 21:35), Max: 99.3 (21 Aug 2019 13:33)  HR: 70 (21 Aug 2019 21:35) (70 - 96)  BP: 125/71 (21 Aug 2019 21:35) (103/65 - 151/93)  BP(mean): 97 (21 Aug 2019 16:22) (80 - 115)  RR: 18 (21 Aug 2019 21:35) (16 - 18)  SpO2: 100% (21 Aug 2019 21:35) (99% - 100%)    PHYSICAL EXAM:  GENERAL: NAD, well-groomed, well-developed  HEAD:  Atraumatic, Normocephalic  EYES: EOMI, PERRLA, conjunctiva and sclera clear  ENMT: Moist mucous membranes, Good dentition, No lesions  NECK: Supple, No JVD, Normal thyroid  NERVOUS SYSTEM:  Alert & Oriented X3, Good concentration; Motor Strength 5/5 B/L upper and lower extremities; DTRs 2+ intact and symmetric  CHEST/LUNG: Clear to auscultation bilaterally; No rales, rhonchi, wheezing, or rubs  HEART: Regular rate and rhythm; No murmurs, rubs, or gallops  ABDOMEN: Soft, Nontender, Nondistended; Bowel sounds present  EXTREMITIES:  2+ Peripheral Pulses, No clubbing, cyanosis, or edema  LYMPH: No lymphadenopathy noted  SKIN: No rashes or lesions    Consultant(s) Notes Reviewed:  [x ] YES  [ ] NO  Care Discussed with Consultants/Other Providers [ x] YES  [ ] NO    LABS:                        13.7   12.14 )-----------( 227      ( 21 Aug 2019 07:54 )             44.4     08-21    139  |  108  |  16  ----------------------------<  122<H>  4.5   |  23  |  0.75    Ca    7.6<L>      21 Aug 2019 07:54  Phos  1.6     08-21  Mg     2.2     08-21      PTT - ( 20 Aug 2019 07:37 )  PTT:34.7 sec      CAPILLARY BLOOD GLUCOSE          RADIOLOGY & ADDITIONAL TESTS:    Imaging Personally Reviewed:  [ ] YES  [ ] NO Transfer note acceptance from [facility] to [facility].     Hospital Course:  83 year old male with a PMHx of multiple myeloma on Chemotherapy(last tx Friday), Prostate Cancer(s/p SEED therapy), paroxsymal Afib (not an AC due to Hx of GIB), Recurrent DVTs, who presented with symptoms of lethargy for one day and met severe sepsis criteria on admission, vanc/zosyn started for sepsis and was eventually discontinued after legionella urine antigen was positive on 08/20. Now on azithromycin 500 mg IV. Due to history of recurrent DVTs, was started on heparin gtt. Patient was found to be in SVT/afib rvr and given 5 mg Lopressor on 08/19. TTE and US doppler of LE performed. Patient refused left leg doppler. Patient changed to full dose lovenox for high suspicion of DVT/PE. RIJ placed. Now stable for transfer to the regional medical floor.       INTERVAL HPI/OVERNIGHT EVENTS: Patient seen at bedside. He was A&Ox1. Patient not in acute distress. Does have some pain at the site of the RIJ.       Vital Signs Last 24 Hrs  T(C): 36.9 (21 Aug 2019 21:35), Max: 37.4 (21 Aug 2019 13:33)  T(F): 98.4 (21 Aug 2019 21:35), Max: 99.3 (21 Aug 2019 13:33)  HR: 70 (21 Aug 2019 21:35) (70 - 96)  BP: 125/71 (21 Aug 2019 21:35) (103/65 - 151/93)  BP(mean): 97 (21 Aug 2019 16:22) (80 - 115)  RR: 18 (21 Aug 2019 21:35) (16 - 18)  SpO2: 100% (21 Aug 2019 21:35) (99% - 100%)    PHYSICAL EXAM:  GENERAL: NAD   HEAD:  Atraumatic, Normocephalic  EYES: EOMI, PERRLA, conjunctiva and sclera clear  ENMT: Moist mucous membranes,    NECK: Supple, No JVD   NERVOUS SYSTEM:  Alert & Oriented X1, NFD  CHEST/LUNG: Clear to auscultation bilaterally; No rales, rhonchi, wheezing, or rubs  HEART: irregular rate and rhythm; No murmurs, rubs, or gallops  ABDOMEN: Soft, Nontender, Nondistended; Bowel sounds present  EXTREMITIES:  2+ Peripheral Pulses, No clubbing, cyanosis; 3+ pitting edema bilaterally      LABS:                        13.7   12.14 )-----------( 227      ( 21 Aug 2019 07:54 )             44.4     08-21    139  |  108  |  16  ----------------------------<  122<H>  4.5   |  23  |  0.75    Ca    7.6<L>      21 Aug 2019 07:54  Phos  1.6     08-21  Mg     2.2     08-21      PTT - ( 20 Aug 2019 07:37 )  PTT:34.7 sec      CAPILLARY BLOOD GLUCOSE          RADIOLOGY & ADDITIONAL TESTS: Reviewed Transfer note acceptance from [facility] to [facility].     Hospital Course:  83 year old male with a PMHx of multiple myeloma on Chemotherapy(last tx Friday), Prostate Cancer(s/p SEED therapy), paroxsymal Afib (not an AC due to Hx of GIB), Recurrent DVTs, who presented with symptoms of lethargy for one day and met severe sepsis criteria on admission, vanc/zosyn started for sepsis and was eventually discontinued after legionella urine antigen was positive on 08/20. Now on azithromycin 500 mg IV. Due to history of recurrent DVTs, was started on heparin gtt, now discontinued. Now on lovenox. Patient was found to be in SVT/afib rvr and given 5 mg Lopressor on 08/19. TTE and US doppler of LE performed. Patient refused left leg doppler. Patient changed to full dose lovenox for high suspicion of DVT/PE. RIJ placed. Now stable for transfer to the regional medical floor.       INTERVAL HPI/OVERNIGHT EVENTS: Patient seen at bedside. He was A&Ox1. Patient not in acute distress. Does have some pain at the site of the RIJ.       Vital Signs Last 24 Hrs  T(C): 36.9 (21 Aug 2019 21:35), Max: 37.4 (21 Aug 2019 13:33)  T(F): 98.4 (21 Aug 2019 21:35), Max: 99.3 (21 Aug 2019 13:33)  HR: 70 (21 Aug 2019 21:35) (70 - 96)  BP: 125/71 (21 Aug 2019 21:35) (103/65 - 151/93)  BP(mean): 97 (21 Aug 2019 16:22) (80 - 115)  RR: 18 (21 Aug 2019 21:35) (16 - 18)  SpO2: 100% (21 Aug 2019 21:35) (99% - 100%)    PHYSICAL EXAM:  GENERAL: NAD   HEAD:  Atraumatic, Normocephalic  EYES: EOMI, PERRLA, conjunctiva and sclera clear  ENMT: Moist mucous membranes,    NECK: Supple, No JVD   NERVOUS SYSTEM:  Alert & Oriented X1, NFD  CHEST/LUNG: Clear to auscultation bilaterally; No rales, rhonchi, wheezing, or rubs  HEART: irregular rate and rhythm; No murmurs, rubs, or gallops  ABDOMEN: Soft, Nontender, Nondistended; Bowel sounds present  EXTREMITIES:  2+ Peripheral Pulses, No clubbing, cyanosis; 3+ pitting edema bilaterally      LABS:                        13.7   12.14 )-----------( 227      ( 21 Aug 2019 07:54 )             44.4     08-21    139  |  108  |  16  ----------------------------<  122<H>  4.5   |  23  |  0.75    Ca    7.6<L>      21 Aug 2019 07:54  Phos  1.6     08-21  Mg     2.2     08-21      PTT - ( 20 Aug 2019 07:37 )  PTT:34.7 sec      CAPILLARY BLOOD GLUCOSE          RADIOLOGY & ADDITIONAL TESTS: Reviewed

## 2019-08-21 NOTE — PROGRESS NOTE ADULT - ASSESSMENT
A 84yo M with PMH of multiple myeloma on Chemotherapy(last tx Friday), Prostate Cancer(s/p SEED therapy), paroxsymal Afib (not an AC due to Hx of GIB), Recurrent DVTs, presents with symptoms of lethargy for one day; meeting severe sepsis criteria on admission likely 2/2 CAP along with Afib RVR, now with Legionella pneumonia.

## 2019-08-21 NOTE — PROGRESS NOTE ADULT - PROBLEM SELECTOR PLAN 3
-Afib with RVR  -Not on anticoagulation as an outpatient due to history of GI bleeds  -Continue monitoring on telemetry   - no ischemic changes on EKG  - trops negative x1   - patient with significant BL pitting edema 4+, no other signs of overload, no previous echo  - f/up echocardiogram   -F/u Cards recs  - obtain collateral from PCP Dr. Nolen -Patient has history of recurrent DVT  -High Suspicion for thromboembolic event given  -Heparin started on 8/20 DC due to access issues. Lovenox q12 started 1 mg/kg (70 mg q 12)   -Treated with Lovenox for high suspicion of PE/DVT  -F/U Doppler studies

## 2019-08-21 NOTE — PROGRESS NOTE ADULT - PROBLEM SELECTOR PLAN 1
-likely secondary to pneumonia. Focal consolidation in LLL on CXR  Pt meeting 3/4 sepsis criteria given fever, tachycardia and leukocytosis along with elevated lactate. CXR with LLL infiltrate; likley CAP given no recent hospitalization, no signs//symptoms of aspiration. However given recent chemotherapy and immunocompromised status will cover for nosocomial infection including pseudomonas.   - s/p 2.5L NS bolus   - Lactate trended: 3.0 to 1.4  - s/p Vanc and Zosyn in ED  - UA negative  -Legionella Antigen Positive. Azithromycin started  -DC Zosyn -likely secondary to pneumonia. Focal consolidation in LLL on CXR  Pt meeting 3/4 sepsis criteria given fever, tachycardia and leukocytosis along with elevated lactate. CXR with LLL infiltrate; likley CAP given no recent hospitalization, no signs//symptoms of aspiration. However given recent chemotherapy and immunocompromised status will cover for nosocomial infection including pseudomonas.   - s/p 2.5L NS bolus in ED  - Lactate trended: 3.0 to 1.4  - s/p Vanc and Zosyn in ED  - UA negative  -Legionella Antigen Positive. Azithromycin started 8/20.  -Projecting 10 day course (8/20-8/30)  -DC Zosyn -Resolved  -likely secondary to pneumonia. Focal consolidation in LLL on CXR  Pt meeting 3/4 sepsis criteria given fever, tachycardia and leukocytosis along with elevated lactate. CXR with LLL infiltrate; likley CAP given no recent hospitalization, no signs//symptoms of aspiration. However given recent chemotherapy and immunocompromised status will cover for nosocomial infection including pseudomonas.   - s/p 2.5L NS bolus in ED  - Lactate trended: 3.0 to 1.4  - s/p Vanc and Zosyn in ED  - UA negative  -Legionella Antigen Positive. Azithromycin started 8/20.  -Projecting 10 day course (8/20-8/30)  -DC Zosyn

## 2019-08-21 NOTE — PROGRESS NOTE ADULT - ASSESSMENT
A 84yo M with PMH of multiple myeloma on Chemotherapy(last tx Friday), Prostate Cancer(s/p SEED therapy), paroxsymal Afib (not an AC due to Hx of GIB), Recurrent DVTs, presents with symptoms of lethargy for one day; meeting severe sepsis criteria on admission likely 2/2 CAP along with Afib RVR, now with Legionella pneumonia, on azithromycin. Currently stable for transfer from [facility] to [facility].

## 2019-08-22 DIAGNOSIS — N40.0 BENIGN PROSTATIC HYPERPLASIA WITHOUT LOWER URINARY TRACT SYMPTOMS: ICD-10-CM

## 2019-08-22 DIAGNOSIS — H40.9 UNSPECIFIED GLAUCOMA: ICD-10-CM

## 2019-08-22 DIAGNOSIS — Z86.718 PERSONAL HISTORY OF OTHER VENOUS THROMBOSIS AND EMBOLISM: ICD-10-CM

## 2019-08-22 LAB
ANION GAP SERPL CALC-SCNC: 9 MMOL/L — SIGNIFICANT CHANGE UP (ref 5–17)
BASOPHILS # BLD AUTO: 0.01 K/UL — SIGNIFICANT CHANGE UP (ref 0–0.2)
BASOPHILS NFR BLD AUTO: 0.1 % — SIGNIFICANT CHANGE UP (ref 0–2)
BLD GP AB SCN SERPL QL: NEGATIVE — SIGNIFICANT CHANGE UP
BUN SERPL-MCNC: 14 MG/DL — SIGNIFICANT CHANGE UP (ref 7–23)
CALCIUM SERPL-MCNC: 7.6 MG/DL — LOW (ref 8.4–10.5)
CHLORIDE SERPL-SCNC: 105 MMOL/L — SIGNIFICANT CHANGE UP (ref 96–108)
CO2 SERPL-SCNC: 24 MMOL/L — SIGNIFICANT CHANGE UP (ref 22–31)
CREAT SERPL-MCNC: 0.66 MG/DL — SIGNIFICANT CHANGE UP (ref 0.5–1.3)
EOSINOPHIL # BLD AUTO: 0.04 K/UL — SIGNIFICANT CHANGE UP (ref 0–0.5)
EOSINOPHIL NFR BLD AUTO: 0.4 % — SIGNIFICANT CHANGE UP (ref 0–6)
FOLATE SERPL-MCNC: >20 NG/ML — SIGNIFICANT CHANGE UP
GLUCOSE SERPL-MCNC: 115 MG/DL — HIGH (ref 70–99)
HAV IGM SER-ACNC: SIGNIFICANT CHANGE UP
HBV CORE IGM SER-ACNC: SIGNIFICANT CHANGE UP
HBV SURFACE AB SER-ACNC: SIGNIFICANT CHANGE UP
HBV SURFACE AG SER-ACNC: SIGNIFICANT CHANGE UP
HCT VFR BLD CALC: 34.8 % — LOW (ref 39–50)
HCT VFR BLD CALC: 36.2 % — LOW (ref 39–50)
HCV AB S/CO SERPL IA: 0.02 S/CO — SIGNIFICANT CHANGE UP
HCV AB SERPL-IMP: SIGNIFICANT CHANGE UP
HGB BLD-MCNC: 11.2 G/DL — LOW (ref 13–17)
HGB BLD-MCNC: 11.9 G/DL — LOW (ref 13–17)
HIV 1+2 AB+HIV1 P24 AG SERPL QL IA: SIGNIFICANT CHANGE UP
IMM GRANULOCYTES NFR BLD AUTO: 0.9 % — SIGNIFICANT CHANGE UP (ref 0–1.5)
LYMPHOCYTES # BLD AUTO: 2.45 K/UL — SIGNIFICANT CHANGE UP (ref 1–3.3)
LYMPHOCYTES # BLD AUTO: 24.5 % — SIGNIFICANT CHANGE UP (ref 13–44)
MAGNESIUM SERPL-MCNC: 1.8 MG/DL — SIGNIFICANT CHANGE UP (ref 1.6–2.6)
MCHC RBC-ENTMCNC: 31.3 PG — SIGNIFICANT CHANGE UP (ref 27–34)
MCHC RBC-ENTMCNC: 31.3 PG — SIGNIFICANT CHANGE UP (ref 27–34)
MCHC RBC-ENTMCNC: 32.2 GM/DL — SIGNIFICANT CHANGE UP (ref 32–36)
MCHC RBC-ENTMCNC: 32.9 GM/DL — SIGNIFICANT CHANGE UP (ref 32–36)
MCV RBC AUTO: 95.3 FL — SIGNIFICANT CHANGE UP (ref 80–100)
MCV RBC AUTO: 97.2 FL — SIGNIFICANT CHANGE UP (ref 80–100)
MONOCYTES # BLD AUTO: 1.09 K/UL — HIGH (ref 0–0.9)
MONOCYTES NFR BLD AUTO: 10.9 % — SIGNIFICANT CHANGE UP (ref 2–14)
NEUTROPHILS # BLD AUTO: 6.31 K/UL — SIGNIFICANT CHANGE UP (ref 1.8–7.4)
NEUTROPHILS NFR BLD AUTO: 63.2 % — SIGNIFICANT CHANGE UP (ref 43–77)
NRBC # BLD: 2 /100 WBCS — HIGH (ref 0–0)
NRBC # BLD: 2 /100 WBCS — HIGH (ref 0–0)
PHOSPHATE SERPL-MCNC: 2 MG/DL — LOW (ref 2.5–4.5)
PLATELET # BLD AUTO: 239 K/UL — SIGNIFICANT CHANGE UP (ref 150–400)
PLATELET # BLD AUTO: 251 K/UL — SIGNIFICANT CHANGE UP (ref 150–400)
POTASSIUM SERPL-MCNC: 3.9 MMOL/L — SIGNIFICANT CHANGE UP (ref 3.5–5.3)
POTASSIUM SERPL-SCNC: 3.9 MMOL/L — SIGNIFICANT CHANGE UP (ref 3.5–5.3)
RBC # BLD: 3.58 M/UL — LOW (ref 4.2–5.8)
RBC # BLD: 3.8 M/UL — LOW (ref 4.2–5.8)
RBC # FLD: 15.4 % — HIGH (ref 10.3–14.5)
RBC # FLD: 15.6 % — HIGH (ref 10.3–14.5)
RH IG SCN BLD-IMP: POSITIVE — SIGNIFICANT CHANGE UP
SODIUM SERPL-SCNC: 138 MMOL/L — SIGNIFICANT CHANGE UP (ref 135–145)
VIT B12 SERPL-MCNC: 1070 PG/ML — SIGNIFICANT CHANGE UP (ref 232–1245)
WBC # BLD: 7.71 K/UL — SIGNIFICANT CHANGE UP (ref 3.8–10.5)
WBC # BLD: 9.99 K/UL — SIGNIFICANT CHANGE UP (ref 3.8–10.5)
WBC # FLD AUTO: 7.71 K/UL — SIGNIFICANT CHANGE UP (ref 3.8–10.5)
WBC # FLD AUTO: 9.99 K/UL — SIGNIFICANT CHANGE UP (ref 3.8–10.5)

## 2019-08-22 PROCEDURE — 71045 X-RAY EXAM CHEST 1 VIEW: CPT | Mod: 26

## 2019-08-22 PROCEDURE — 93010 ELECTROCARDIOGRAM REPORT: CPT

## 2019-08-22 PROCEDURE — 99233 SBSQ HOSP IP/OBS HIGH 50: CPT | Mod: GC

## 2019-08-22 RX ORDER — PANTOPRAZOLE SODIUM 20 MG/1
40 TABLET, DELAYED RELEASE ORAL
Refills: 0 | Status: DISCONTINUED | OUTPATIENT
Start: 2019-08-22 | End: 2019-08-28

## 2019-08-22 RX ORDER — POTASSIUM PHOSPHATE, MONOBASIC POTASSIUM PHOSPHATE, DIBASIC 236; 224 MG/ML; MG/ML
30 INJECTION, SOLUTION INTRAVENOUS ONCE
Refills: 0 | Status: COMPLETED | OUTPATIENT
Start: 2019-08-22 | End: 2019-08-22

## 2019-08-22 RX ORDER — AZITHROMYCIN 500 MG/1
500 TABLET, FILM COATED ORAL EVERY 24 HOURS
Refills: 0 | Status: DISCONTINUED | OUTPATIENT
Start: 2019-08-23 | End: 2019-08-26

## 2019-08-22 RX ADMIN — Medication 500000 UNIT(S): at 17:40

## 2019-08-22 RX ADMIN — AZITHROMYCIN 500 MILLIGRAM(S): 500 TABLET, FILM COATED ORAL at 13:35

## 2019-08-22 RX ADMIN — Medication 500000 UNIT(S): at 06:29

## 2019-08-22 RX ADMIN — Medication 500000 UNIT(S): at 13:35

## 2019-08-22 RX ADMIN — ENOXAPARIN SODIUM 70 MILLIGRAM(S): 100 INJECTION SUBCUTANEOUS at 06:29

## 2019-08-22 RX ADMIN — Medication 25 MILLIGRAM(S): at 17:39

## 2019-08-22 RX ADMIN — POTASSIUM PHOSPHATE, MONOBASIC POTASSIUM PHOSPHATE, DIBASIC 83.33 MILLIMOLE(S): 236; 224 INJECTION, SOLUTION INTRAVENOUS at 13:35

## 2019-08-22 RX ADMIN — HYDROXYUREA 500 MILLIGRAM(S): 500 CAPSULE ORAL at 17:40

## 2019-08-22 RX ADMIN — Medication 81 MILLIGRAM(S): at 13:34

## 2019-08-22 RX ADMIN — HYDROXYUREA 500 MILLIGRAM(S): 500 CAPSULE ORAL at 06:29

## 2019-08-22 RX ADMIN — ENOXAPARIN SODIUM 70 MILLIGRAM(S): 100 INJECTION SUBCUTANEOUS at 17:39

## 2019-08-22 RX ADMIN — Medication 25 MILLIGRAM(S): at 06:29

## 2019-08-22 NOTE — PROGRESS NOTE ADULT - SUBJECTIVE AND OBJECTIVE BOX
Subjective: 83M w/ PMH multiple myeloma (on bortezomib), prostate CA, paroxysmal afib (not on outpatient AC due to h/o GIB) admitted for legionella pneumonia and sepsis, found to have afib with RVR, stepped down from 7 [address]. No acute events overnight. Reports sleeping well, denies SOB. Pt is bothered by central line.     Objective:   General: AOx2, NAD, resting comfortably in bed  HEENT: no LAD, moist mucous membranes; no oral thrush   Neck: no LAD, central line in place  Cardiac: regular rate and rhythm; normal S1, S2; no m/g/r  Resp: inspiratory wheezes auscultated in L lung field; no ronchi, crackles; no accessory muscle use, no retractions  Abd: soft, non-tender, non-distended  Ext: small ulcers at medial thighs bilaterally and base of scrotum; no erythema or purulence Subjective: 83M w/ PMH multiple myeloma (on bortezomib), prostate CA, paroxysmal afib (not on outpatient AC due to h/o GIB) admitted for legionella pneumonia and sepsis, found to have afib with RVR, stepped down from 7 [address]. No acute events overnight. Reports sleeping well, denies SOB. Pt is bothered by central line.     Patient's PCP contacted [phone] - left message and will return call.     Objective:   General: AOx2, NAD, resting comfortably in bed  HEENT: no LAD, moist mucous membranes; no oral thrush   Neck: no LAD, central line in place  Cardiac: regular rate and rhythm; normal S1, S2; no m/g/r  Resp: inspiratory wheezes auscultated in L lung field; no ronchi, crackles; no accessory muscle use, no retractions  Abd: soft, non-tender, non-distended  Ext: small ulcers at medial thighs bilaterally and base of scrotum; no erythema or purulence Subjective: 83M w/ PMH multiple myeloma (on bortezomib), prostate CA, paroxysmal afib (not on outpatient AC due to h/o GIB) admitted for legionella pneumonia and sepsis, found to have afib with RVR, stepped down from 7 [address]. No acute events overnight. Reports sleeping well, denies SOB. Pt is bothered by central line.     Patient's PCP contacted [phone] - left message and will return call.     Objective:   General: AOx2, NAD, resting comfortably in bed  HEENT: no LAD, moist mucous membranes; no oral thrush   Neck: no LAD, central line in place  Cardiac: regular rate and rhythm; normal S1, S2; no m/g/r  Resp: inspiratory wheezes auscultated in L lung field; no ronchi, crackles; no accessory muscle use, no retractions  Abd: soft, non-tender, non-distended  Ext: small ulcers at medial thighs bilaterally and base of scrotum; no erythema or purulence; bilateral pitting edema in LEs 1+ Subjective: 83M w/ PMH multiple myeloma (on bortezomib), prostate CA, paroxysmal afib (not on outpatient AC due to h/o GIB) admitted for legionella pneumonia and sepsis, found to have afib with RVR, stepped down from [address]. No acute events overnight. Reports sleeping well, denies SOB. Pt is bothered by central line.     Patient's PCP contacted [phone] - left message and will return call.     Objective:   General: AOx2, NAD, resting comfortably in bed  HEENT: no LAD, moist mucous membranes; no oral thrush   Neck: no LAD, central line in place  Cardiac: regular rate and rhythm; normal S1, S2; no m/g/r  Resp: inspiratory wheezes auscultated in L lung field; no ronchi, crackles; no accessory muscle use, no retractions  Abd: soft, non-tender, non-distended  Ext: small ulcers at medial thighs bilaterally and base of scrotum; no erythema or purulence; bilateral pitting edema in LEs 1+    OVERNIGHT EVENTS:    SUBJECTIVE / INTERVAL HPI: Patient seen and examined at bedside.     VITAL SIGNS:  Vital Signs Last 24 Hrs  T(C): 37.2 (22 Aug 2019 06:02), Max: 37.4 (21 Aug 2019 13:33)  T(F): 99 (22 Aug 2019 06:02), Max: 99.3 (21 Aug 2019 13:33)  HR: 79 (22 Aug 2019 06:02) (70 - 90)  BP: 136/75 (22 Aug 2019 06:02) (125/71 - 145/71)  BP(mean): 97 (21 Aug 2019 16:22) (97 - 97)  RR: 18 (22 Aug 2019 06:02) (18 - 18)  SpO2: 97% (22 Aug 2019 06:02) (97% - 100%)    PHYSICAL EXAM:    General: WDWN  HEENT: NC/AT; PERRL, anicteric sclera  Neck: supple  Cardiovascular: +S1/S2; RRR  Respiratory: CTA b/l; no W/R/R  Gastrointestinal: soft, NT/ND; +BSx4  Extremities: WWP; no edema, clubbing or cyanosis  Vascular: 2+ radial, DP/PT pulses b/l  Neurological: AAOx3; no focal deficits    MEDICATIONS:  MEDICATIONS  (STANDING):  aspirin enteric coated 81 milliGRAM(s) Oral daily  azithromycin  40 mG/mL Suspension 500 milliGRAM(s) Oral daily  enoxaparin Injectable 70 milliGRAM(s) SubCutaneous every 12 hours  heparin  flush 100 Units/mL Injectable 100 Unit(s) IV Push every other day  hydroxyurea 500 milliGRAM(s) Oral every 12 hours  metoprolol tartrate 25 milliGRAM(s) Oral two times a day  nystatin    Suspension 171239 Unit(s) Oral four times a day  potassium phosphate IVPB 30 milliMole(s) IV Intermittent once    MEDICATIONS  (PRN):  acetaminophen    Suspension .. 650 milliGRAM(s) Oral every 6 hours PRN Temp greater or equal to 38C (100.4F)  acetaminophen   Tablet .. 650 milliGRAM(s) Oral every 6 hours PRN Mild Pain (1 - 3)  calcium carbonate    500 mG (Tums) Chewable 3 Tablet(s) Chew every 6 hours PRN Dyspepsia  melatonin 5 milliGRAM(s) Oral at bedtime PRN Sleep      ALLERGIES:  Allergies    No Known Allergies    Intolerances        LABS:                        11.2   7.71  )-----------( 239      ( 22 Aug 2019 10:41 )             34.8     08-22    138  |  105  |  14  ----------------------------<  115<H>  3.9   |  24  |  0.66    Ca    7.6<L>      22 Aug 2019 05:46  Phos  2.0     08-22  Mg     1.8     08-22          CAPILLARY BLOOD GLUCOSE      POCT Blood Glucose.: 101 mg/dL (20 Aug 2019 16:31)      RADIOLOGY & ADDITIONAL TESTS: Reviewed.    ASSESSMENT:    PLAN:

## 2019-08-22 NOTE — PROGRESS NOTE ADULT - PROBLEM SELECTOR PLAN 3
Patient has recurrent history of DVT. Heparin drip started on 8/20 and d/c due to access issues at which point triple lumen central line was placed. Lovenox 70 mg q12 started for high suspicion of DVT/PE given current malignancy, h/o DVT.   - repeat Doppler US today and f/u  - maintain central line in setting of difficult access    #Anemia: Hgb dropped 13.7 --> 11.9 --> 11.2 per CBC this AM. No gross signs of GI bleed - no hematemesis, melena, tachycardia, hypotension.   - continue to trend Hgb

## 2019-08-22 NOTE — CHART NOTE - NSCHARTNOTEFT_GEN_A_CORE
Right IJ removed without complications or bleeding. If patient short of breath overnight get chest Xray.

## 2019-08-22 NOTE — PROGRESS NOTE ADULT - PROBLEM SELECTOR PLAN 1
Pt presented following progressive lethargy with 3/4 SIRS criteria - tachycardia, febrile, leukocytosis, with LLL infiltrate on CXR. Had lactate 3.0 and met criteria for severe sepsis. Vancomycin and Zosyn started in ED given recent chemotherapy and immunocompromised status. Given 2.5L NS in ED. Lactate since downtrended to 1.4. Urine antigen assay positive for legionella and pt was switched to azithromycin 500 mg qd.   - continue azithromycin 500 mg qd

## 2019-08-22 NOTE — PROGRESS NOTE ADULT - PROBLEM SELECTOR PLAN 4
Pt was not on anticoagulation (h/o GI bleed) as outpatient or on rate control per PCP's office ([place] [phone]). Found to have RVR during hospital course and was tachy to 140s while on 7 [address] on telemetry. No ischemic changes on EKG, trops negative x1, no wall motion abnormalities on echo. Started on Lopressor for rate control.   - Continue Lopressor 25 mg BID  - f/u ECG today

## 2019-08-22 NOTE — PROGRESS NOTE ADULT - ASSESSMENT
83M w/ PMH multiple myeloma, prostate CA, afib with RVR, DVT admitted for sepsis and Legionella pneumonia, currently on azithromycin 500 mg qd. No longer meeting sepsis criteria or experiencing RVR.

## 2019-08-23 ENCOUNTER — TRANSCRIPTION ENCOUNTER (OUTPATIENT)
Age: 84
End: 2019-08-23

## 2019-08-23 LAB
ANION GAP SERPL CALC-SCNC: 8 MMOL/L — SIGNIFICANT CHANGE UP (ref 5–17)
BASOPHILS # BLD AUTO: 0.02 K/UL — SIGNIFICANT CHANGE UP (ref 0–0.2)
BASOPHILS NFR BLD AUTO: 0.3 % — SIGNIFICANT CHANGE UP (ref 0–2)
BUN SERPL-MCNC: 8 MG/DL — SIGNIFICANT CHANGE UP (ref 7–23)
CA-I BLD-SCNC: 1.1 MMOL/L — LOW (ref 1.12–1.3)
CALCIUM SERPL-MCNC: 7.7 MG/DL — LOW (ref 8.4–10.5)
CHLORIDE SERPL-SCNC: 107 MMOL/L — SIGNIFICANT CHANGE UP (ref 96–108)
CO2 SERPL-SCNC: 24 MMOL/L — SIGNIFICANT CHANGE UP (ref 22–31)
CREAT SERPL-MCNC: 0.59 MG/DL — SIGNIFICANT CHANGE UP (ref 0.5–1.3)
EOSINOPHIL # BLD AUTO: 0.04 K/UL — SIGNIFICANT CHANGE UP (ref 0–0.5)
EOSINOPHIL NFR BLD AUTO: 0.5 % — SIGNIFICANT CHANGE UP (ref 0–6)
FERRITIN SERPL-MCNC: 325 NG/ML — SIGNIFICANT CHANGE UP (ref 30–400)
GLUCOSE SERPL-MCNC: 110 MG/DL — HIGH (ref 70–99)
HCT VFR BLD CALC: 33.5 % — LOW (ref 39–50)
HCT VFR BLD CALC: 38.1 % — LOW (ref 39–50)
HGB BLD-MCNC: 11.2 G/DL — LOW (ref 13–17)
HGB BLD-MCNC: 12.2 G/DL — LOW (ref 13–17)
IMM GRANULOCYTES NFR BLD AUTO: 1.5 % — SIGNIFICANT CHANGE UP (ref 0–1.5)
IRON SATN MFR SERPL: 28 % — SIGNIFICANT CHANGE UP (ref 16–55)
IRON SATN MFR SERPL: 35 UG/DL — LOW (ref 45–165)
LYMPHOCYTES # BLD AUTO: 1.67 K/UL — SIGNIFICANT CHANGE UP (ref 1–3.3)
LYMPHOCYTES # BLD AUTO: 22.8 % — SIGNIFICANT CHANGE UP (ref 13–44)
MAGNESIUM SERPL-MCNC: 1.9 MG/DL — SIGNIFICANT CHANGE UP (ref 1.6–2.6)
MCHC RBC-ENTMCNC: 30.8 PG — SIGNIFICANT CHANGE UP (ref 27–34)
MCHC RBC-ENTMCNC: 31.2 PG — SIGNIFICANT CHANGE UP (ref 27–34)
MCHC RBC-ENTMCNC: 32 GM/DL — SIGNIFICANT CHANGE UP (ref 32–36)
MCHC RBC-ENTMCNC: 33.4 GM/DL — SIGNIFICANT CHANGE UP (ref 32–36)
MCV RBC AUTO: 93.3 FL — SIGNIFICANT CHANGE UP (ref 80–100)
MCV RBC AUTO: 96.2 FL — SIGNIFICANT CHANGE UP (ref 80–100)
MONOCYTES # BLD AUTO: 1.02 K/UL — HIGH (ref 0–0.9)
MONOCYTES NFR BLD AUTO: 13.9 % — SIGNIFICANT CHANGE UP (ref 2–14)
NEUTROPHILS # BLD AUTO: 4.46 K/UL — SIGNIFICANT CHANGE UP (ref 1.8–7.4)
NEUTROPHILS NFR BLD AUTO: 61 % — SIGNIFICANT CHANGE UP (ref 43–77)
NRBC # BLD: 1 /100 WBCS — HIGH (ref 0–0)
NRBC # BLD: 1 /100 WBCS — HIGH (ref 0–0)
PLATELET # BLD AUTO: 242 K/UL — SIGNIFICANT CHANGE UP (ref 150–400)
PLATELET # BLD AUTO: 280 K/UL — SIGNIFICANT CHANGE UP (ref 150–400)
POTASSIUM SERPL-MCNC: 3.9 MMOL/L — SIGNIFICANT CHANGE UP (ref 3.5–5.3)
POTASSIUM SERPL-SCNC: 3.9 MMOL/L — SIGNIFICANT CHANGE UP (ref 3.5–5.3)
RBC # BLD: 3.59 M/UL — LOW (ref 4.2–5.8)
RBC # BLD: 3.96 M/UL — LOW (ref 4.2–5.8)
RBC # FLD: 15.3 % — HIGH (ref 10.3–14.5)
RBC # FLD: 15.8 % — HIGH (ref 10.3–14.5)
SODIUM SERPL-SCNC: 139 MMOL/L — SIGNIFICANT CHANGE UP (ref 135–145)
TIBC SERPL-MCNC: 127 UG/DL — LOW (ref 220–430)
UIBC SERPL-MCNC: 92 UG/DL — LOW (ref 110–370)
WBC # BLD: 7.32 K/UL — SIGNIFICANT CHANGE UP (ref 3.8–10.5)
WBC # BLD: 7.38 K/UL — SIGNIFICANT CHANGE UP (ref 3.8–10.5)
WBC # FLD AUTO: 7.32 K/UL — SIGNIFICANT CHANGE UP (ref 3.8–10.5)
WBC # FLD AUTO: 7.38 K/UL — SIGNIFICANT CHANGE UP (ref 3.8–10.5)

## 2019-08-23 PROCEDURE — 99233 SBSQ HOSP IP/OBS HIGH 50: CPT | Mod: GC

## 2019-08-23 PROCEDURE — 93970 EXTREMITY STUDY: CPT | Mod: 26

## 2019-08-23 RX ORDER — APIXABAN 2.5 MG/1
5 TABLET, FILM COATED ORAL
Refills: 0 | Status: DISCONTINUED | OUTPATIENT
Start: 2019-08-23 | End: 2019-08-28

## 2019-08-23 RX ADMIN — HYDROXYUREA 500 MILLIGRAM(S): 500 CAPSULE ORAL at 07:22

## 2019-08-23 RX ADMIN — AZITHROMYCIN 500 MILLIGRAM(S): 500 TABLET, FILM COATED ORAL at 13:07

## 2019-08-23 RX ADMIN — Medication 500000 UNIT(S): at 07:19

## 2019-08-23 RX ADMIN — Medication 500000 UNIT(S): at 13:07

## 2019-08-23 RX ADMIN — Medication 5 MILLIGRAM(S): at 21:48

## 2019-08-23 RX ADMIN — HYDROXYUREA 500 MILLIGRAM(S): 500 CAPSULE ORAL at 18:05

## 2019-08-23 RX ADMIN — Medication 81 MILLIGRAM(S): at 13:07

## 2019-08-23 RX ADMIN — Medication 25 MILLIGRAM(S): at 18:05

## 2019-08-23 RX ADMIN — ENOXAPARIN SODIUM 70 MILLIGRAM(S): 100 INJECTION SUBCUTANEOUS at 07:19

## 2019-08-23 RX ADMIN — Medication 25 MILLIGRAM(S): at 07:19

## 2019-08-23 RX ADMIN — APIXABAN 5 MILLIGRAM(S): 2.5 TABLET, FILM COATED ORAL at 18:05

## 2019-08-23 RX ADMIN — Medication 650 MILLIGRAM(S): at 22:50

## 2019-08-23 RX ADMIN — PANTOPRAZOLE SODIUM 40 MILLIGRAM(S): 20 TABLET, DELAYED RELEASE ORAL at 07:19

## 2019-08-23 RX ADMIN — Medication 650 MILLIGRAM(S): at 21:49

## 2019-08-23 NOTE — PHYSICAL THERAPY INITIAL EVALUATION ADULT - GENERAL OBSERVATIONS, REHAB EVAL
Chart reviewed. IE Completed. Patient without complaints of pain at rest, agreeable to PT. Patient received semi-supine, NAD, +IV hep lock, +[place] cath, +soiled linens 2/2 incontinent bowel movement, SARAY Brown cleared patient for treatment (as per SARAY Brown, patient was administered Lovenox on 8/22/19 at 6:00pm)

## 2019-08-23 NOTE — PHYSICAL THERAPY INITIAL EVALUATION ADULT - ORIENTATION, REHAB EVAL
place/person/states "19-two thousand" (re-oriented to 2019); states 'March', correctly chooses summer vs. winter however unable to provided summer months (re-oriented to Friday, August 23, 2019).

## 2019-08-23 NOTE — DIETITIAN INITIAL EVALUATION ADULT. - PERTINENT MEDS FT
MEDICATIONS  (STANDING):  aspirin enteric coated 81 milliGRAM(s) Oral daily  azithromycin  40 mG/mL Suspension 500 milliGRAM(s) Oral every 24 hours  enoxaparin Injectable 70 milliGRAM(s) SubCutaneous every 12 hours  heparin  flush 100 Units/mL Injectable 100 Unit(s) IV Push every other day  hydroxyurea 500 milliGRAM(s) Oral every 12 hours  metoprolol tartrate 25 milliGRAM(s) Oral two times a day  nystatin    Suspension 916951 Unit(s) Oral four times a day  pantoprazole    Tablet 40 milliGRAM(s) Oral before breakfast    MEDICATIONS  (PRN):  acetaminophen   Tablet .. 650 milliGRAM(s) Oral every 6 hours PRN Mild Pain (1 - 3)  calcium carbonate    500 mG (Tums) Chewable 3 Tablet(s) Chew every 6 hours PRN Dyspepsia  melatonin 5 milliGRAM(s) Oral at bedtime PRN Sleep

## 2019-08-23 NOTE — PHYSICAL THERAPY INITIAL EVALUATION ADULT - ASR EQUIP NEEDS DISCH PT EVAL
Patient owns rolling walker and wheelchair Patient owns rolling walker, rollator, and straight cane.

## 2019-08-23 NOTE — PHYSICAL THERAPY INITIAL EVALUATION ADULT - THERAPY FREQUENCY, PT EVAL
2-3x/week/Patient educated on frequency of inpatient therapy at [facility], patient verbalized understanding.

## 2019-08-23 NOTE — DISCHARGE NOTE PROVIDER - NSDCCPCAREPLAN_GEN_ALL_CORE_FT
PRINCIPAL DISCHARGE DIAGNOSIS  Diagnosis: Sepsis, due to unspecified organism  Assessment and Plan of Treatment: You were found to have pneumonia secondary to a legionella.        SECONDARY DISCHARGE DIAGNOSES  Diagnosis: Atrial fibrillation, unspecified type  Assessment and Plan of Treatment: You had a irregular heart rate and were found to have Atrial Fibrillation (Afib). You were placed on a beta blocker, Lopressor, which has safely and effectively treated your afib.    Diagnosis: Multiple myeloma, remission status unspecified  Assessment and Plan of Treatment: PRINCIPAL DISCHARGE DIAGNOSIS  Diagnosis: Sepsis, due to unspecified organism  Assessment and Plan of Treatment: You were found to have pneumonia secondary to a legionella.        SECONDARY DISCHARGE DIAGNOSES  Diagnosis: Atrial fibrillation, unspecified type  Assessment and Plan of Treatment: You had a irregular heart rate and were found to have Atrial Fibrillation (Afib). You were placed on a beta blocker, Lopressor, which has safely and effectively treated your afib. You will go home on 50mg of Toprolol XL, which is equivalent to the Lopressor.   You also were placed on Eliqus 5mg, which is an anti-coagulant medication to prevent blood clots.    Diagnosis: Multiple myeloma, remission status unspecified  Assessment and Plan of Treatment: PRINCIPAL DISCHARGE DIAGNOSIS  Diagnosis: Sepsis, due to unspecified organism  Assessment and Plan of Treatment: You were found to have pneumonia secondary to a legionella. You were treated with an antibiotic Azithromycin 500mg.        SECONDARY DISCHARGE DIAGNOSES  Diagnosis: Atrial fibrillation, unspecified type  Assessment and Plan of Treatment: You had a irregular heart rate and were found to have Atrial Fibrillation (Afib). You were placed on a beta blocker, Lopressor, which has safely and effectively treated your afib. You will go home on 50mg of Toprolol XL, which is equivalent to the Lopressor.   You also were placed on Eliqus 5mg, which is an anti-coagulant medication to prevent blood clots.    Diagnosis: Multiple myeloma, remission status unspecified  Assessment and Plan of Treatment: You are on chemotherapy for your multiple myeloma. Follow up with your Dr. Rubin appointment to discuss next steps and next treatment.

## 2019-08-23 NOTE — DIETITIAN INITIAL EVALUATION ADULT. - PERTINENT LABORATORY DATA
08-23  139  |  107  |  8   ----------------------------<  110<H>  3.9   |  24  |  0.59  Ca    7.7<L>      23 Aug 2019 08:00  Phos  2.0     08-22  Mg     1.9     08-23

## 2019-08-23 NOTE — PHYSICAL THERAPY INITIAL EVALUATION ADULT - GAIT DEVIATIONS NOTED, PT EVAL
decreased velocity of limb motion/decreased weight-shifting ability/decreased step length/slightly unsteady gait however no LOB/knee buckling noted; **significantly increased time required to complete task (~5 minutes to ambulate 30 feet x 1); **+fecal incontinence with ambulation/decreased monica

## 2019-08-23 NOTE — PROGRESS NOTE ADULT - PROBLEM SELECTOR PLAN 4
Pt was not on anticoagulation (h/o GI bleed) as outpatient or on rate control per PCP's office ([place]aby [phone]). Found to have RVR during hospital course and was tachy to 140s while on 7 Lachman on telemetry. No ischemic changes on EKG, trops negative x1, no wall motion abnormalities on echo. Started on Lopressor for rate control.   - Continue Lopressor 25 mg BID  - f/u ECG today

## 2019-08-23 NOTE — DISCHARGE NOTE PROVIDER - HOSPITAL COURSE
Patient is 84yo M with past medical history of multiple myeloma on chemotherapy (last treatment 8/16), Prostate Cancer(s/p SEED therapy), paroxsymal Afib (not an AC due to Hx of GIB), Recurrent DVTs, presents with symptoms of lethargy for one day.        Presented with lethargy, found to have severe sepsis 2/2 to legionella pneumonia.         Problem List/Main Diagnoses (system-based):     # Severe sepsis/legionella    - Was treated with vanc/zosyn and then transitioned to azithromycin once cultures speciated         # Afib w/ RVR    - was started on lopressor 5mg w/ resolution of afib episodes.        # Recurrent DVTs    -     Inpatient treatment course:                 New medications:     Labs to be followed outpatient:     Exam to be followed outpatient: Patient is 82yo M with past medical history of multiple myeloma on chemotherapy (last treatment 8/16), Prostate Cancer(s/p SEED therapy), paroxsymal Afib (not an AC due to Hx of GIB), Recurrent DVTs, presents with symptoms of lethargy for one day.        Presented with lethargy, found to have severe sepsis 2/2 to legionella pneumonia.         Problem List/Main Diagnoses (system-based):     # Severe sepsis/legionella    - Was treated with vanc/zosyn and then transitioned to azithromycin once cultures speciated.         # Afib w/ RVR    - was started on lopressor 5mg w/ resolution of afib episodes.    - apixaban 5mg        # Recurrent DVTs    - Doppler studies did not demonstrate evidence of DVT.                    New medications: Azithromycin    Labs to be followed outpatient:     Exam to be followed outpatient: Patient is 84yo M with past medical history of multiple myeloma on chemotherapy (last treatment 8/16), Prostate Cancer(s/p SEED therapy), paroxsymal Afib (not an AC due to Hx of GIB), Recurrent DVTs, presents with symptoms of lethargy for one day.        Presented with lethargy, found to have severe sepsis 2/2 to legionella pneumonia.         Problem List/Main Diagnoses (system-based):     # Severe sepsis/legionella    - Was treated with vanc/zosyn and then transitioned to azithromycin once cultures speciated.         # Afib w/ RVR    - was started on lopressor 25mg BID w/ resolution of afib episodes.    - apixaban 5mg        # Recurrent DVTs    - Doppler studies did not demonstrate evidence of DVT.                    New medications:  Toprolol XL 50mg once a day    Labs to be followed outpatient:     Exam to be followed outpatient:

## 2019-08-23 NOTE — PHYSICAL THERAPY INITIAL EVALUATION ADULT - PERTINENT HX OF CURRENT PROBLEM, REHAB EVAL
A 82yo M with PMH of multiple myeloma on Chemotherapy(last tx Friday), Prostate Cancer(s/p SEED therapy), paroxsymal Afib (not an AC due to Hx of GIB), Recurrent DVTs, presents with symptoms of lethargy for one day. Per patient wife at bedside patient has been more tired for the past day and this morning was noted to be sitting on the toilet and unable to get up due to fatigue. Please refer to H&P on [place] for remaining.

## 2019-08-23 NOTE — DISCHARGE NOTE PROVIDER - PROVIDER TOKENS
PROVIDER:[TOKEN:[[id number]]],FREE:[LAST:[brittani],FIRST:[],PHONE:[phone]],FAX:[(   [phone]    -],ADDRESS:[[address] [place] [address], [address]],FOLLOWUP:[2 weeks]]

## 2019-08-23 NOTE — DIETITIAN INITIAL EVALUATION ADULT. - PROBLEM SELECTOR PLAN 5
-Takes HCTZ 25 mg as an outpatient  -Consider adding diuretic due to pitting bilateral edema  -F/u Cardiology recommendations

## 2019-08-23 NOTE — DIETITIAN INITIAL EVALUATION ADULT. - ADD RECOMMEND
- MD please add mechanical soft to DASH/TLC diet order.  - Recommend ensure enlive 1 unit/day (350 kcals, 20 gm pro).  - Obtain weight q 2-3 days to trend weekly.  - MD to consider MVI/M.  - Replete lyes as needed. - Nutrition education: Importance of adequate Po intake; balanced meal selections.  Pt will likely benefit from continued follow-up and discussion with spouse regarding adequate intake.

## 2019-08-23 NOTE — DIETITIAN INITIAL EVALUATION ADULT. - ENERGY NEEDS
Current BW: 69.6kg; Ht: 170.2cm; BMI: 24.1; IBW: 67.2kg; %IBW: 104%  Current BW used for calculations as data between %.  Nutrient needs based on [facility] standards of care for older adults and oncology.

## 2019-08-23 NOTE — PROGRESS NOTE ADULT - SUBJECTIVE AND OBJECTIVE BOX
On interval followup, the right int jugular catheter was removed, The site is non-indurated, non-inflamed and non-tender. Notes, cultures and progress are followed.

## 2019-08-23 NOTE — DISCHARGE NOTE PROVIDER - CARE PROVIDER_API CALL
Kassandra Rubin)  HematologyOncology; Internal Medicine  [address] [address]  Phone: [phone]  Fax: [phone]  Follow Up Time:     Dr. brittani  [address] [place], [place], [address]  Phone: [phone]  Fax: (   )    -  Follow Up Time: 2 weeks

## 2019-08-23 NOTE — DISCHARGE NOTE PROVIDER - NSDCFUADDAPPT_GEN_ALL_CORE_FT
Appointment with Dr. Rubin September 3rd at 12pm.    Appointment with Dr. Thomsa September 7th at 11:15am

## 2019-08-23 NOTE — PHYSICAL THERAPY INITIAL EVALUATION ADULT - PHYSICAL ASSIST/NONPHYSICAL ASSIST: SUPINE/SIT, REHAB EVAL
verbal cues/1 person assist/able to bring B/L LEs to EOB with VCs; increased assist for trunk mobility/nonverbal cues (demo/gestures)

## 2019-08-23 NOTE — DIETITIAN INITIAL EVALUATION ADULT. - OTHER INFO
Mr. Chicas is a 83 y.o male admitted for weakness.  PMH sign for multiple myeloma on chemo, prostate CA s/p SEED therapy, A.fib not on AC, recurrent DVT's.  Pt met criteria for severe sepsis; antibiotics started.  Legionella PNA identified.  R IJ placed upon admission but now removed.    Pt resting in bed; noted is edentulous.  Doesn't have dentures with him and reports spouse has.  Pt provided some food preferences but limited report.  Pt AAO x3 per MD notes but Pt not able to provide nutritional Hx.  No pressure injuries noted.  NFKA.  No observations of muscle or fat mass losses noted.    Nutrition status classified at moderate risk.

## 2019-08-23 NOTE — PROGRESS NOTE ADULT - SUBJECTIVE AND OBJECTIVE BOX
INTERVAL HPI/OVERNIGHT EVENTS: IJ was removed, Hgb was stable, Passed trial of void.    SUBJECTIVE: Patient seen and examined at bedside. No complaints.    CONSTITUTIONAL: No weakness, fevers or chills  EYES/ENT: No visual changes;  No vertigo or throat pain   NECK: No pain or stiffness  RESPIRATORY: No cough, wheezing, hemoptysis; No shortness of breath  CARDIOVASCULAR: No chest pain or palpitations  GASTROINTESTINAL: No abdominal or epigastric pain. No nausea, vomiting, or hematemesis; No diarrhea or constipation. No melena or hematochezia.  GENITOURINARY: No dysuria, frequency or hematuria  NEUROLOGICAL: No numbness or weakness  SKIN: No itching, rashes    OBJECTIVE:    VITAL SIGNS:  ICU Vital Signs Last 24 Hrs  T(C): 37.1 (23 Aug 2019 13:15), Max: 37.2 (22 Aug 2019 13:30)  T(F): 98.7 (23 Aug 2019 13:15), Max: 98.9 (22 Aug 2019 13:30)  HR: 83 (23 Aug 2019 13:15) (78 - 100)  BP: 132/71 (23 Aug 2019 13:15) (119/69 - 132/71)  BP(mean): --  ABP: --  ABP(mean): --  RR: 17 (23 Aug 2019 13:15) (17 - 18)  SpO2: 97% (23 Aug 2019 13:15) (94% - 97%)        08-22 @ 07:01  -  08-23 @ 07:00  --------------------------------------------------------  IN: 0 mL / OUT: 250 mL / NET: -250 mL      CAPILLARY BLOOD GLUCOSE          PHYSICAL EXAM:    General: AOx2, NAD, resting comfortably in bed  HEENT: no LAD, moist mucous membranes; no oral thrush   Neck: no LAD, central line in place  Cardiac: regular rate and rhythm; normal S1, S2; no m/g/r  Resp: inspiratory wheezes auscultated in L lung field; no ronchi, crackles; no accessory muscle use, no retractions  Abd: soft, non-tender, non-distended  Ext: small ulcers at medial thighs bilaterally and base of scrotum; no erythema or purulence; bilateral pitting edema in LEs 1+    MEDICATIONS:  MEDICATIONS  (STANDING):  aspirin enteric coated 81 milliGRAM(s) Oral daily  azithromycin  40 mG/mL Suspension 500 milliGRAM(s) Oral every 24 hours  enoxaparin Injectable 70 milliGRAM(s) SubCutaneous every 12 hours  heparin  flush 100 Units/mL Injectable 100 Unit(s) IV Push every other day  hydroxyurea 500 milliGRAM(s) Oral every 12 hours  metoprolol tartrate 25 milliGRAM(s) Oral two times a day  nystatin    Suspension [id number] Unit(s) Oral four times a day  pantoprazole    Tablet 40 milliGRAM(s) Oral before breakfast    MEDICATIONS  (PRN):  acetaminophen   Tablet .. 650 milliGRAM(s) Oral every 6 hours PRN Mild Pain (1 - 3)  calcium carbonate    500 mG (Tums) Chewable 3 Tablet(s) Chew every 6 hours PRN Dyspepsia  melatonin 5 milliGRAM(s) Oral at bedtime PRN Sleep      ALLERGIES:  Allergies    No Known Allergies    Intolerances        LABS:                        12.2   7.38  )-----------( 280      ( 23 Aug 2019 08:00 )             38.1     08-23    139  |  107  |  8   ----------------------------<  110<H>  3.9   |  24  |  0.59    Ca    7.7<L>      23 Aug 2019 08:00  Phos  2.0     08-22  Mg     1.9     08-23            RADIOLOGY & ADDITIONAL TESTS: Reviewed.

## 2019-08-23 NOTE — DIETITIAN INITIAL EVALUATION ADULT. - PROBLEM SELECTOR PLAN 1
-likely secondary to pneumonia. Focal consolidation in LLL on CXR  Pt meeting 3/4 sepsis criteria given fever, tachycardia and leukocytosis along with elevated lactate. CXR with LLL infiltrate; likley CAP given no recent hospitalization, no signs//symptoms of aspiration. However given recent chemotherapy and immunocompromised status will cover for nosocomial infection including pseudomonas.   - s/p 2.5L NS bolus   - Lactate trended: 3.0 to 1.4  - s/p Vanc and Zosyn in ED, will continue with zosyn anti-pseudomonal dose  - UA negative  - f/up blood cultures

## 2019-08-23 NOTE — PHYSICAL THERAPY INITIAL EVALUATION ADULT - PHYSICAL ASSIST/NONPHYSICAL ASSIST: SIT/SUPINE, REHAB EVAL
nonverbal cues (demo/gestures)/1 person assist/verbal cues/fair eccentric trunk control; increased assist for B/L LEs onto bed surface

## 2019-08-23 NOTE — DIETITIAN INITIAL EVALUATION ADULT. - PROBLEM SELECTOR PLAN 3
-Afib with RVR  -Not on anticoagulation as an outpatient due to history of GI bleeds  -Continue monitoring on telemetry   - no ischemic changes on EKG  - trops negative x1   - patient with significant BL pitting edema 4+, no other signs of overload, no previous echo  - f/up echocardiogram   - Consider consult cardiology   - obtain collateral from PCP Dr. Nolen

## 2019-08-24 DIAGNOSIS — Z91.89 OTHER SPECIFIED PERSONAL RISK FACTORS, NOT ELSEWHERE CLASSIFIED: ICD-10-CM

## 2019-08-24 LAB
CULTURE RESULTS: SIGNIFICANT CHANGE UP
CULTURE RESULTS: SIGNIFICANT CHANGE UP
HCT VFR BLD CALC: 36.6 % — LOW (ref 39–50)
HGB BLD-MCNC: 11.7 G/DL — LOW (ref 13–17)
MCHC RBC-ENTMCNC: 30.8 PG — SIGNIFICANT CHANGE UP (ref 27–34)
MCHC RBC-ENTMCNC: 32 GM/DL — SIGNIFICANT CHANGE UP (ref 32–36)
MCV RBC AUTO: 96.3 FL — SIGNIFICANT CHANGE UP (ref 80–100)
NRBC # BLD: 2 /100 WBCS — HIGH (ref 0–0)
PLATELET # BLD AUTO: 278 K/UL — SIGNIFICANT CHANGE UP (ref 150–400)
RBC # BLD: 3.8 M/UL — LOW (ref 4.2–5.8)
RBC # FLD: 16 % — HIGH (ref 10.3–14.5)
SPECIMEN SOURCE: SIGNIFICANT CHANGE UP
SPECIMEN SOURCE: SIGNIFICANT CHANGE UP
WBC # BLD: 7.44 K/UL — SIGNIFICANT CHANGE UP (ref 3.8–10.5)
WBC # FLD AUTO: 7.44 K/UL — SIGNIFICANT CHANGE UP (ref 3.8–10.5)

## 2019-08-24 PROCEDURE — 99232 SBSQ HOSP IP/OBS MODERATE 35: CPT | Mod: GC

## 2019-08-24 RX ADMIN — APIXABAN 5 MILLIGRAM(S): 2.5 TABLET, FILM COATED ORAL at 06:44

## 2019-08-24 RX ADMIN — HYDROXYUREA 500 MILLIGRAM(S): 500 CAPSULE ORAL at 19:02

## 2019-08-24 RX ADMIN — PANTOPRAZOLE SODIUM 40 MILLIGRAM(S): 20 TABLET, DELAYED RELEASE ORAL at 06:44

## 2019-08-24 RX ADMIN — Medication 81 MILLIGRAM(S): at 11:38

## 2019-08-24 RX ADMIN — HYDROXYUREA 500 MILLIGRAM(S): 500 CAPSULE ORAL at 06:45

## 2019-08-24 RX ADMIN — AZITHROMYCIN 500 MILLIGRAM(S): 500 TABLET, FILM COATED ORAL at 11:39

## 2019-08-24 RX ADMIN — Medication 25 MILLIGRAM(S): at 06:44

## 2019-08-24 RX ADMIN — Medication 25 MILLIGRAM(S): at 19:32

## 2019-08-24 RX ADMIN — APIXABAN 5 MILLIGRAM(S): 2.5 TABLET, FILM COATED ORAL at 19:02

## 2019-08-24 NOTE — PROGRESS NOTE ADULT - ATTENDING COMMENTS
Patient seen and examined at bedside. Agree with exam, a/p as above with the following addendum/edits:     Briefly, 83 year old M w/ MM, prostate cancer, p/w lethargy, found to have Legionella PNA and a fib w/ RVR in the setting of sepsis. Initially admitted to  for a fib w/ RVR, now stepped down to [facility]. He has a history of GIB but when was on Coumadin and with a supratherapeutic INR. H/H stable here on full dose A/C, will transition from lovenox to Eliquis. On exam, he is pleasant, AAOx2, with MM, no lymphadenopathy, irregular HR, lungs w/ minimal crackles L base otherwise clear, abd soft NTND, R IJ TLC removed, díaz removed, extremities with trace edema b/l, able to lift legs against gravity. PT recommends ERROL. C/w azithro for legionella PNA. Check EKG for qtc.
Patient seen and examined at bedside. Agree with exam, a/p as above with the following addendum/edits:    Pending ERROL.
Patient seen and examined with house-staff during bedside rounds  Resident note read, including vitals, physical findings, laboratory data, and radiological reports.   Revisions included below.  Case discussed with House staff  Direct personal management at bedside  and extensive interpretation of data. Decision making of high complexity.
Patient seen and examined at bedside. Agree with exam, a/p as above with the following addendum/edits:     Briefly, 83 year old M w/ MM, prostate cancer, p/w lethargy, found to have Legionella PNA and a fib w/ RVR in the setting of sepsis. Initially admitted to  for a fib w/ RVR, now stepped down to [facility]. He has a history of GIB but unclear when and is not on A/C although he has a history of A fib w/ a high CHADsVasc and a history of DVTs. Will need to obtain more collateral from wife and PCP about discontinuation of A/C. He was started on full dose A/C for suspicion for DVT/PE? although incomplete Doppler showed no DVT. He is pending a repeat LE Doppler. On exam, he is pleasant, AAOx2, with MM, no lymphadenopathy, irregular HR, lungs w/ minimal crackles L base otherwise clear, abd soft NTND, R IJ TLC, +díaz, extremities with trace edema b/l, able to lift legs against gravity. H/H dropped but likely dilutional as Cr now improved, likely had pre-renal SARAH. F/u repeat H/H given history of GIB although no melena reported. Pending PT recs as well.
patient seen and examined  reviewed pertinent data, above chat   pe : elderly male, hard of hearing ; missing teeth; MMM; +RIJ TLC noted; s1s2, mild b/l rhonchi;  abdomen soft/ nt/nd;  +2-3 pitting edema of lower ext b/l, L>R, nontender     1. sepsis resolved 2/2 legionella PNA: c/w azithromycin. followup ctxs, check AM CXR.   2. hx of afib/  DVT, not on AC 2/2 GI bleed; restarted on AC in setting of rapid Afib;  currently on lovenox ; repeat dopplers ordered as pt refused majority of examination ; monitor CBC, monitor for signs of bleeding      rest of plan as above
Patient seen and examined; Events noted; Continue treatment for Legionella pneumonia

## 2019-08-24 NOTE — PROGRESS NOTE ADULT - SUBJECTIVE AND OBJECTIVE BOX
INTERVAL HPI/OVERNIGHT EVENTS: As per nurse and patient, no o/n events.    SUBJECTIVE: Patient was seen and examined at bedside.  Complains of nodule near his IJ site, otherwise no complaints at this time. Patient denies: fever, chills, dizziness, weakness, HA, Changes in vision, CP, palpitations, SOB, cough, N/V/D/C, dysuria, changes in bowel movements, LE edema. ROS otherwise negative.    VITAL SIGNS:  T(F): 98 (08-24-19 @ 06:34)  HR: 82 (08-24-19 @ 06:34)  BP: 156/91 (08-24-19 @ 06:34)  RR: 18 (08-24-19 @ 06:34)  SpO2: 98% (08-24-19 @ 06:34)  Wt(kg): --    PHYSICAL EXAM:    General: AOx2, NAD, resting comfortably in bed  HEENT: no LAD, moist mucous membranes; no oral thrush   Neck: no LAD, central line in place  Cardiac: regular rate and rhythm; normal S1, S2; no m/g/r  Resp: inspiratory wheezes auscultated in L lung field; no ronchi, crackles; no accessory muscle use, no retractions  Abd: soft, non-tender, non-distended  Ext: small ulcers at medial thighs bilaterally and base of scrotum; no erythema or purulence; bilateral pitting edema in LEs 1+    MEDICATIONS  (STANDING):  apixaban 5 milliGRAM(s) Oral two times a day  aspirin enteric coated 81 milliGRAM(s) Oral daily  azithromycin  40 mG/mL Suspension 500 milliGRAM(s) Oral every 24 hours  heparin  flush 100 Units/mL Injectable 100 Unit(s) IV Push every other day  hydroxyurea 500 milliGRAM(s) Oral every 12 hours  metoprolol tartrate 25 milliGRAM(s) Oral two times a day  pantoprazole    Tablet 40 milliGRAM(s) Oral before breakfast    MEDICATIONS  (PRN):  acetaminophen   Tablet .. 650 milliGRAM(s) Oral every 6 hours PRN Mild Pain (1 - 3)  calcium carbonate    500 mG (Tums) Chewable 3 Tablet(s) Chew every 6 hours PRN Dyspepsia  melatonin 5 milliGRAM(s) Oral at bedtime PRN Sleep      Allergies    No Known Allergies    Intolerances        LABS:                        11.7   7.44  )-----------( 278      ( 24 Aug 2019 08:50 )             36.6     08-23    139  |  107  |  8   ----------------------------<  110<H>  3.9   |  24  |  0.59    Ca    7.7<L>      23 Aug 2019 08:00  Mg     1.9     08-23            RADIOLOGY & ADDITIONAL TESTS:  Reviewed

## 2019-08-24 NOTE — PROGRESS NOTE ADULT - ASSESSMENT
83M w/ PMH multiple myeloma, prostate CA, afib with RVR, DVT admitted for sepsis and Legionella pneumonia, currently on azithromycin 500 mg qd. No longer meeting sepsis criteria or experiencing RVR. Awaiting ERROL placement on Monday.

## 2019-08-24 NOTE — PROGRESS NOTE ADULT - PROBLEM SELECTOR PLAN 4
Pt was not on anticoagulation (h/o GI bleed) as outpatient or on rate control per PCP's office ([place] [phone]). Found to have RVR during hospital course and was tachy to 140s while on 7 Lachman on telemetry. No ischemic changes on EKG, trops negative x1, no wall motion abnormalities on echo. Started on Lopressor for rate control.   - Continue Lopressor 25 mg BID  - on eliquis 5mg

## 2019-08-24 NOTE — PROGRESS NOTE ADULT - PROBLEM SELECTOR PLAN 3
Patient has recurrent history of DVT. Heparin drip started on 8/20 and d/c due to access issues at which point triple lumen central line was placed. Lovenox 70 mg q12 started for high suspicion of DVT/PE given current malignancy, h/o DVT.   - repeat Doppler US showed no evidence of DVT    #Anemia: Hgb 13.7 --> 11.9 --> 11.2 --> 11.7 per CBC this AM. No gross signs of GI bleed - no hematemesis, melena, tachycardia, hypotension.   - continue to trend Hgb

## 2019-08-25 PROCEDURE — 99232 SBSQ HOSP IP/OBS MODERATE 35: CPT | Mod: GC

## 2019-08-25 RX ORDER — TAMSULOSIN HYDROCHLORIDE 0.4 MG/1
0.4 CAPSULE ORAL AT BEDTIME
Refills: 0 | Status: DISCONTINUED | OUTPATIENT
Start: 2019-08-25 | End: 2019-08-28

## 2019-08-25 RX ADMIN — HYDROXYUREA 500 MILLIGRAM(S): 500 CAPSULE ORAL at 06:49

## 2019-08-25 RX ADMIN — Medication 81 MILLIGRAM(S): at 12:54

## 2019-08-25 RX ADMIN — Medication 25 MILLIGRAM(S): at 18:27

## 2019-08-25 RX ADMIN — PANTOPRAZOLE SODIUM 40 MILLIGRAM(S): 20 TABLET, DELAYED RELEASE ORAL at 06:49

## 2019-08-25 RX ADMIN — AZITHROMYCIN 500 MILLIGRAM(S): 500 TABLET, FILM COATED ORAL at 12:54

## 2019-08-25 RX ADMIN — HYDROXYUREA 500 MILLIGRAM(S): 500 CAPSULE ORAL at 18:27

## 2019-08-25 RX ADMIN — TAMSULOSIN HYDROCHLORIDE 0.4 MILLIGRAM(S): 0.4 CAPSULE ORAL at 21:35

## 2019-08-25 RX ADMIN — Medication 25 MILLIGRAM(S): at 06:49

## 2019-08-25 RX ADMIN — APIXABAN 5 MILLIGRAM(S): 2.5 TABLET, FILM COATED ORAL at 06:49

## 2019-08-25 RX ADMIN — APIXABAN 5 MILLIGRAM(S): 2.5 TABLET, FILM COATED ORAL at 18:27

## 2019-08-25 NOTE — PROGRESS NOTE ADULT - SUBJECTIVE AND OBJECTIVE BOX
OVERNIGHT EVENTS: PRUDENCIO    SUBJECTIVE / INTERVAL HPI: Patient seen and examined at bedside.     VITAL SIGNS:  Vital Signs Last 24 Hrs  T(C): 37.4 (25 Aug 2019 06:00), Max: 37.7 (24 Aug 2019 19:33)  T(F): 99.4 (25 Aug 2019 06:00), Max: 99.9 (24 Aug 2019 19:33)  HR: 76 (25 Aug 2019 06:00) (70 - 88)  BP: 168/83 (25 Aug 2019 06:00) (132/78 - 168/83)  BP(mean): 104 (24 Aug 2019 21:07) (104 - 104)  RR: 18 (25 Aug 2019 06:00) (16 - 18)  SpO2: 96% (25 Aug 2019 06:00) (96% - 96%)    PHYSICAL EXAM:    General: WDWN  Neck: supple  Cardiovascular: irregular  Respiratory: CTA b/l;   Gastrointestinal: soft, NT/ND; +BSx4  Extremities: WWP;       MEDICATIONS:  MEDICATIONS  (STANDING):  apixaban 5 milliGRAM(s) Oral two times a day  aspirin enteric coated 81 milliGRAM(s) Oral daily  azithromycin  40 mG/mL Suspension 500 milliGRAM(s) Oral every 24 hours  heparin  flush 100 Units/mL Injectable 100 Unit(s) IV Push every other day  hydroxyurea 500 milliGRAM(s) Oral every 12 hours  metoprolol tartrate 25 milliGRAM(s) Oral two times a day  pantoprazole    Tablet 40 milliGRAM(s) Oral before breakfast    MEDICATIONS  (PRN):  acetaminophen   Tablet .. 650 milliGRAM(s) Oral every 6 hours PRN Mild Pain (1 - 3)  calcium carbonate    500 mG (Tums) Chewable 3 Tablet(s) Chew every 6 hours PRN Dyspepsia  melatonin 5 milliGRAM(s) Oral at bedtime PRN Sleep      ALLERGIES:  Allergies    No Known Allergies    Intolerances        LABS:                        11.7   7.44  )-----------( 278      ( 24 Aug 2019 08:50 )             36.6               CAPILLARY BLOOD GLUCOSE          RADIOLOGY & ADDITIONAL TESTS: Reviewed.    ASSESSMENT:    PLAN:

## 2019-08-26 PROCEDURE — 99232 SBSQ HOSP IP/OBS MODERATE 35: CPT | Mod: GC

## 2019-08-26 RX ADMIN — HYDROXYUREA 500 MILLIGRAM(S): 500 CAPSULE ORAL at 17:18

## 2019-08-26 RX ADMIN — APIXABAN 5 MILLIGRAM(S): 2.5 TABLET, FILM COATED ORAL at 17:18

## 2019-08-26 RX ADMIN — Medication 25 MILLIGRAM(S): at 17:18

## 2019-08-26 RX ADMIN — AZITHROMYCIN 500 MILLIGRAM(S): 500 TABLET, FILM COATED ORAL at 12:26

## 2019-08-26 RX ADMIN — HYDROXYUREA 500 MILLIGRAM(S): 500 CAPSULE ORAL at 06:29

## 2019-08-26 RX ADMIN — APIXABAN 5 MILLIGRAM(S): 2.5 TABLET, FILM COATED ORAL at 06:29

## 2019-08-26 RX ADMIN — Medication 25 MILLIGRAM(S): at 06:29

## 2019-08-26 RX ADMIN — PANTOPRAZOLE SODIUM 40 MILLIGRAM(S): 20 TABLET, DELAYED RELEASE ORAL at 06:29

## 2019-08-26 RX ADMIN — Medication 81 MILLIGRAM(S): at 11:52

## 2019-08-26 RX ADMIN — TAMSULOSIN HYDROCHLORIDE 0.4 MILLIGRAM(S): 0.4 CAPSULE ORAL at 21:29

## 2019-08-26 NOTE — PROGRESS NOTE ADULT - SUBJECTIVE AND OBJECTIVE BOX
INTERVAL HPI/OVERNIGHT EVENTS: As per nurse and patient, no o/n events.    SUBJECTIVE: Patient was seen and examined at bedside.  No complaints at this time. Patient denies: fever, chills, dizziness, weakness, HA, Changes in vision, CP, palpitations, SOB, cough, N/V/D/C, dysuria, changes in bowel movements, LE edema. ROS otherwise negative.    VITAL SIGNS:  T(F): 99 (08-26-19 @ 13:44)  HR: 82 (08-26-19 @ 13:44)  BP: 118/74 (08-26-19 @ 13:44)  RR: 18 (08-26-19 @ 13:44)  SpO2: 95% (08-26-19 @ 13:44)  Wt(kg): --    PHYSICAL EXAM:    General: AOx2, NAD, resting comfortably in bed  HEENT: no LAD, moist mucous membranes; no oral thrush   Neck: no LAD, IJ site healing nicely  Cardiac: regular rate and rhythm; normal S1, S2; no m/g/r  Resp: inspiratory wheezes auscultated in L lung field; no ronchi, crackles; no accessory muscle use, no retractions  Abd: soft, non-tender, non-distended  Ext: small ulcers at medial thighs bilaterally and base of scrotum; no erythema or purulence    MEDICATIONS  (STANDING):  apixaban 5 milliGRAM(s) Oral two times a day  aspirin enteric coated 81 milliGRAM(s) Oral daily  azithromycin  40 mG/mL Suspension 500 milliGRAM(s) Oral every 24 hours  hydroxyurea 500 milliGRAM(s) Oral every 12 hours  metoprolol tartrate 25 milliGRAM(s) Oral two times a day  pantoprazole    Tablet 40 milliGRAM(s) Oral before breakfast  tamsulosin 0.4 milliGRAM(s) Oral at bedtime    MEDICATIONS  (PRN):  acetaminophen   Tablet .. 650 milliGRAM(s) Oral every 6 hours PRN Mild Pain (1 - 3)  calcium carbonate    500 mG (Tums) Chewable 3 Tablet(s) Chew every 6 hours PRN Dyspepsia  melatonin 5 milliGRAM(s) Oral at bedtime PRN Sleep      Allergies    No Known Allergies    Intolerances        LABS:                RADIOLOGY & ADDITIONAL TESTS:  Reviewed

## 2019-08-26 NOTE — PROGRESS NOTE ADULT - PROBLEM SELECTOR PLAN 4
Pt was not on anticoagulation (h/o GI bleed) as outpatient or on rate control per PCP's office ([place] [phone]). Found to have RVR during hospital course and was tachy to 140s while on 7 Lachman on telemetry. No ischemic changes on EKG, trops negative x1, no wall motion abnormalities on echo. Started on Lopressor for rate control.   - Continue Lopressor 25 mg BID  - Continue eliquis 5mg

## 2019-08-27 PROCEDURE — 99232 SBSQ HOSP IP/OBS MODERATE 35: CPT | Mod: GC

## 2019-08-27 RX ORDER — LATANOPROST 0.05 MG/ML
1 SOLUTION/ DROPS OPHTHALMIC; TOPICAL AT BEDTIME
Refills: 0 | Status: DISCONTINUED | OUTPATIENT
Start: 2019-08-27 | End: 2019-08-28

## 2019-08-27 RX ORDER — BRIMONIDINE TARTRATE 2 MG/MG
1 SOLUTION/ DROPS OPHTHALMIC
Refills: 0 | Status: DISCONTINUED | OUTPATIENT
Start: 2019-08-27 | End: 2019-08-28

## 2019-08-27 RX ADMIN — HYDROXYUREA 500 MILLIGRAM(S): 500 CAPSULE ORAL at 18:33

## 2019-08-27 RX ADMIN — APIXABAN 5 MILLIGRAM(S): 2.5 TABLET, FILM COATED ORAL at 06:32

## 2019-08-27 RX ADMIN — HYDROXYUREA 500 MILLIGRAM(S): 500 CAPSULE ORAL at 06:33

## 2019-08-27 RX ADMIN — TAMSULOSIN HYDROCHLORIDE 0.4 MILLIGRAM(S): 0.4 CAPSULE ORAL at 22:55

## 2019-08-27 RX ADMIN — Medication 25 MILLIGRAM(S): at 18:33

## 2019-08-27 RX ADMIN — Medication 81 MILLIGRAM(S): at 11:05

## 2019-08-27 RX ADMIN — Medication 25 MILLIGRAM(S): at 06:32

## 2019-08-27 RX ADMIN — LATANOPROST 1 DROP(S): 0.05 SOLUTION/ DROPS OPHTHALMIC; TOPICAL at 23:26

## 2019-08-27 RX ADMIN — PANTOPRAZOLE SODIUM 40 MILLIGRAM(S): 20 TABLET, DELAYED RELEASE ORAL at 06:32

## 2019-08-27 RX ADMIN — APIXABAN 5 MILLIGRAM(S): 2.5 TABLET, FILM COATED ORAL at 18:33

## 2019-08-27 NOTE — PROGRESS NOTE ADULT - SUBJECTIVE AND OBJECTIVE BOX
INTERVAL HPI/OVERNIGHT EVENTS: As per nurse and patient, no o/n events. Awaiting ERROL.    SUBJECTIVE: Patient was seen and examined at bedside.  No complaints at this time. Patient denies: fever, chills, dizziness, weakness, HA, Changes in vision, CP, palpitations, SOB, cough, N/V/D/C, dysuria, changes in bowel movements, LE edema. ROS otherwise negative.    VITAL SIGNS:  T(F): 99.3 (08-27-19 @ 04:54)  HR: 86 (08-27-19 @ 04:54)  BP: 127/71 (08-27-19 @ 04:54)  RR: 17 (08-27-19 @ 04:54)  SpO2: 97% (08-27-19 @ 04:54)  Wt(kg): --    PHYSICAL EXAM:    General: AOx2, NAD, resting comfortably in bed  HEENT: no LAD, moist mucous membranes; no oral thrush   Neck: no LAD, IJ site healing nicely  Cardiac: regular rate and rhythm; normal S1, S2; no m/g/r  Resp: no ronchi, crackles; no accessory muscle use, no retractions  Abd: soft, non-tender, non-distended  Ext: small ulcers at medial thighs bilaterally and base of scrotum; no erythema or purulence    MEDICATIONS  (STANDING):  apixaban 5 milliGRAM(s) Oral two times a day  aspirin enteric coated 81 milliGRAM(s) Oral daily  hydroxyurea 500 milliGRAM(s) Oral every 12 hours  metoprolol tartrate 25 milliGRAM(s) Oral two times a day  pantoprazole    Tablet 40 milliGRAM(s) Oral before breakfast  tamsulosin 0.4 milliGRAM(s) Oral at bedtime    MEDICATIONS  (PRN):  acetaminophen   Tablet .. 650 milliGRAM(s) Oral every 6 hours PRN Mild Pain (1 - 3)  calcium carbonate    500 mG (Tums) Chewable 3 Tablet(s) Chew every 6 hours PRN Dyspepsia  melatonin 5 milliGRAM(s) Oral at bedtime PRN Sleep      Allergies    No Known Allergies    Intolerances        LABS:                RADIOLOGY & ADDITIONAL TESTS:  Reviewed

## 2019-08-27 NOTE — PROGRESS NOTE ADULT - PROBLEM SELECTOR PLAN 4
Found to have RVR during hospital course and was tachy to 140s while on 7 Lachman on telemetry. No ischemic changes on EKG, trops negative x1, no wall motion abnormalities on echo. Started on Lopressor for rate control.   - Continue Lopressor 25 mg BID  - Continue eliquis 5mg

## 2019-08-27 NOTE — PROGRESS NOTE ADULT - PROBLEM SELECTOR PLAN 1
Pt presented following progressive lethargy with 3/4 SIRS criteria - tachycardia, febrile, leukocytosis, with LLL infiltrate on CXR. Had lactate 3.0 and met criteria for severe sepsis. Vancomycin and Zosyn started in ED given recent chemotherapy and immunocompromised status. Given 2.5L NS in ED. Lactate since downtrended to 1.4. Urine antigen assay positive for legionella and pt was switched to azithromycin 500 mg qd.   - Stopped azithromycin Pt presented following progressive lethargy with 3/4 SIRS criteria - tachycardia, febrile, leukocytosis, with LLL infiltrate on CXR. Had lactate 3.0 and met criteria for severe sepsis. Vancomycin and Zosyn started in ED given recent chemotherapy and immunocompromised status. Given 2.5L NS in ED. Lactate since downtrended to 1.4. Urine antigen assay positive for legionella and pt was switched to azithromycin 500 mg qd.   - completed azithromycin

## 2019-08-27 NOTE — PROGRESS NOTE ADULT - PROBLEM SELECTOR PLAN 3
ANTICOAGULATION FOLLOW-UP CLINIC VISIT    Patient Name:  Susan Ferguson  Date:  5/18/2017  Contact Type:  Face to Face    SUBJECTIVE:     Patient Findings     Positives No Problem Findings           OBJECTIVE    INR Protime   Date Value Ref Range Status   05/18/2017 2.5 (A) 0.86 - 1.14 Final     Factor 2 Assay   Date Value Ref Range Status   03/03/2009 19 (L) 60 - 140 % Final       ASSESSMENT / PLAN  INR assessment THER    Recheck INR In: 4 WEEKS    INR Location Clinic      Anticoagulation Summary as of 5/18/2017     INR goal 2.0-3.0   Today's INR 2.5   Maintenance plan 7.5 mg (5 mg x 1.5) on Mon, Wed, Fri; 5 mg (5 mg x 1) all other days   Full instructions 7.5 mg on Mon, Wed, Fri; 5 mg all other days   Weekly total 42.5 mg   No change documented Lien Ray RN   Plan last modified Socorro Bryant RN (2/9/2017)   Next INR check 6/15/2017   Priority INR   Target end date     Indications   Long-term (current) use of anticoagulants [Z79.01] [Z79.01]  Coagulation defect (HCC) [D68.9] [D68.9]         Anticoagulation Episode Summary     INR check location     Preferred lab     Send INR reminders to CS ANTICOAGULATION    Comments       Anticoagulation Care Providers     Provider Role Specialty Phone number    Lety Vela DO [facility] Internal Medicine [phone]            See the Encounter Report to view Anticoagulation Flowsheet and Dosing Calendar (Go to Encounters tab in chart review, and find the Anticoagulation Therapy Visit)    Dosage adjustment made based on physician directed care plan.    Lien Ray, RN                Patient has recurrent history of DVT. Heparin drip started on 8/20 and d/c due to access issues at which point triple lumen central line was placed. Lovenox 70 mg q12 started for high suspicion of DVT/PE given current malignancy, h/o DVT.   - repeat Doppler US showed no evidence of DVT  - on eliquis 5mg     #Anemia: Hgb 13.7 --> 11.9 --> 11.2 --> 11.7 per CBC this AM. No gross signs of GI bleed - no hematemesis, melena, tachycardia, hypotension.   - continue to trend Hgb

## 2019-08-28 ENCOUNTER — TRANSCRIPTION ENCOUNTER (OUTPATIENT)
Age: 84
End: 2019-08-28

## 2019-08-28 VITALS
RESPIRATION RATE: 18 BRPM | SYSTOLIC BLOOD PRESSURE: 152 MMHG | OXYGEN SATURATION: 94 % | DIASTOLIC BLOOD PRESSURE: 74 MMHG | TEMPERATURE: 99 F | HEART RATE: 81 BPM

## 2019-08-28 DIAGNOSIS — Z02.9 ENCOUNTER FOR ADMINISTRATIVE EXAMINATIONS, UNSPECIFIED: ICD-10-CM

## 2019-08-28 PROCEDURE — 83550 IRON BINDING TEST: CPT

## 2019-08-28 PROCEDURE — 84100 ASSAY OF PHOSPHORUS: CPT

## 2019-08-28 PROCEDURE — 85025 COMPLETE CBC W/AUTO DIFF WBC: CPT

## 2019-08-28 PROCEDURE — 93970 EXTREMITY STUDY: CPT

## 2019-08-28 PROCEDURE — 87040 BLOOD CULTURE FOR BACTERIA: CPT

## 2019-08-28 PROCEDURE — 82607 VITAMIN B-12: CPT

## 2019-08-28 PROCEDURE — 85027 COMPLETE CBC AUTOMATED: CPT

## 2019-08-28 PROCEDURE — 83880 ASSAY OF NATRIURETIC PEPTIDE: CPT

## 2019-08-28 PROCEDURE — 86706 HEP B SURFACE ANTIBODY: CPT

## 2019-08-28 PROCEDURE — 80074 ACUTE HEPATITIS PANEL: CPT

## 2019-08-28 PROCEDURE — 82553 CREATINE MB FRACTION: CPT

## 2019-08-28 PROCEDURE — 86850 RBC ANTIBODY SCREEN: CPT

## 2019-08-28 PROCEDURE — 82550 ASSAY OF CK (CPK): CPT

## 2019-08-28 PROCEDURE — 87389 HIV-1 AG W/HIV-1&-2 AB AG IA: CPT

## 2019-08-28 PROCEDURE — 93306 TTE W/DOPPLER COMPLETE: CPT

## 2019-08-28 PROCEDURE — 83735 ASSAY OF MAGNESIUM: CPT

## 2019-08-28 PROCEDURE — 83540 ASSAY OF IRON: CPT

## 2019-08-28 PROCEDURE — 87641 MR-STAPH DNA AMP PROBE: CPT

## 2019-08-28 PROCEDURE — 82330 ASSAY OF CALCIUM: CPT

## 2019-08-28 PROCEDURE — 83605 ASSAY OF LACTIC ACID: CPT

## 2019-08-28 PROCEDURE — 93005 ELECTROCARDIOGRAM TRACING: CPT

## 2019-08-28 PROCEDURE — 97161 PT EVAL LOW COMPLEX 20 MIN: CPT

## 2019-08-28 PROCEDURE — 71045 X-RAY EXAM CHEST 1 VIEW: CPT

## 2019-08-28 PROCEDURE — 87186 SC STD MICRODIL/AGAR DIL: CPT

## 2019-08-28 PROCEDURE — 85730 THROMBOPLASTIN TIME PARTIAL: CPT

## 2019-08-28 PROCEDURE — 82962 GLUCOSE BLOOD TEST: CPT

## 2019-08-28 PROCEDURE — 80048 BASIC METABOLIC PNL TOTAL CA: CPT

## 2019-08-28 PROCEDURE — 86901 BLOOD TYPING SEROLOGIC RH(D): CPT

## 2019-08-28 PROCEDURE — 85610 PROTHROMBIN TIME: CPT

## 2019-08-28 PROCEDURE — 84443 ASSAY THYROID STIM HORMONE: CPT

## 2019-08-28 PROCEDURE — 82746 ASSAY OF FOLIC ACID SERUM: CPT

## 2019-08-28 PROCEDURE — 97116 GAIT TRAINING THERAPY: CPT

## 2019-08-28 PROCEDURE — 84484 ASSAY OF TROPONIN QUANT: CPT

## 2019-08-28 PROCEDURE — 86900 BLOOD TYPING SEROLOGIC ABO: CPT

## 2019-08-28 PROCEDURE — 82728 ASSAY OF FERRITIN: CPT

## 2019-08-28 PROCEDURE — 81001 URINALYSIS AUTO W/SCOPE: CPT

## 2019-08-28 PROCEDURE — 36415 COLL VENOUS BLD VENIPUNCTURE: CPT

## 2019-08-28 PROCEDURE — 99239 HOSP IP/OBS DSCHRG MGMT >30: CPT | Mod: GC

## 2019-08-28 PROCEDURE — 96375 TX/PRO/DX INJ NEW DRUG ADDON: CPT

## 2019-08-28 PROCEDURE — 87086 URINE CULTURE/COLONY COUNT: CPT

## 2019-08-28 PROCEDURE — 87449 NOS EACH ORGANISM AG IA: CPT

## 2019-08-28 PROCEDURE — 96365 THER/PROPH/DIAG IV INF INIT: CPT

## 2019-08-28 PROCEDURE — 80053 COMPREHEN METABOLIC PANEL: CPT

## 2019-08-28 PROCEDURE — 99285 EMERGENCY DEPT VISIT HI MDM: CPT | Mod: 25

## 2019-08-28 RX ORDER — METOPROLOL TARTRATE 50 MG
1 TABLET ORAL
Qty: 0 | Refills: 0 | DISCHARGE
Start: 2019-08-28

## 2019-08-28 RX ORDER — LANOLIN ALCOHOL/MO/W.PET/CERES
1 CREAM (GRAM) TOPICAL
Qty: 0 | Refills: 0 | DISCHARGE
Start: 2019-08-28

## 2019-08-28 RX ORDER — BRIMONIDINE TARTRATE 2 MG/MG
1 SOLUTION/ DROPS OPHTHALMIC
Qty: 0 | Refills: 0 | DISCHARGE

## 2019-08-28 RX ORDER — PANTOPRAZOLE SODIUM 20 MG/1
1 TABLET, DELAYED RELEASE ORAL
Qty: 0 | Refills: 0 | DISCHARGE
Start: 2019-08-28

## 2019-08-28 RX ORDER — APIXABAN 2.5 MG/1
1 TABLET, FILM COATED ORAL
Qty: 0 | Refills: 0 | DISCHARGE
Start: 2019-08-28

## 2019-08-28 RX ORDER — HYDROXYUREA 500 MG/1
1 CAPSULE ORAL
Qty: 0 | Refills: 0 | DISCHARGE

## 2019-08-28 RX ORDER — LATANOPROST 0.05 MG/ML
1 SOLUTION/ DROPS OPHTHALMIC; TOPICAL
Qty: 0 | Refills: 0 | DISCHARGE

## 2019-08-28 RX ADMIN — PANTOPRAZOLE SODIUM 40 MILLIGRAM(S): 20 TABLET, DELAYED RELEASE ORAL at 06:12

## 2019-08-28 RX ADMIN — HYDROXYUREA 500 MILLIGRAM(S): 500 CAPSULE ORAL at 17:09

## 2019-08-28 RX ADMIN — BRIMONIDINE TARTRATE 1 DROP(S): 2 SOLUTION/ DROPS OPHTHALMIC at 17:09

## 2019-08-28 RX ADMIN — APIXABAN 5 MILLIGRAM(S): 2.5 TABLET, FILM COATED ORAL at 06:12

## 2019-08-28 RX ADMIN — HYDROXYUREA 500 MILLIGRAM(S): 500 CAPSULE ORAL at 06:12

## 2019-08-28 RX ADMIN — Medication 25 MILLIGRAM(S): at 17:08

## 2019-08-28 RX ADMIN — Medication 81 MILLIGRAM(S): at 12:46

## 2019-08-28 RX ADMIN — Medication 25 MILLIGRAM(S): at 06:12

## 2019-08-28 RX ADMIN — APIXABAN 5 MILLIGRAM(S): 2.5 TABLET, FILM COATED ORAL at 17:09

## 2019-08-28 NOTE — PROGRESS NOTE ADULT - PROBLEM SELECTOR PLAN 4
Patient has recurrent history of DVT. Heparin drip started on 8/20 and d/c due to access issues at which point triple lumen central line was placed. Lovenox 70 mg q12 started for high suspicion of DVT/PE given current malignancy, h/o DVT.   - repeat Doppler US showed no evidence of DVT  - on eliquis 5mg     #Anemia: Hgb 13.7 --> 11.9 --> 11.2 --> 11.7 per CBC this AM. No gross signs of GI bleed - no hematemesis, melena, tachycardia, hypotension.   - continue to trend Hgb

## 2019-08-28 NOTE — PROGRESS NOTE ADULT - PROBLEM SELECTOR PLAN 8
-Continue home medication of latanoprost and alphagan
Mullins placed on 7 Lachman for urinary retention. Pt is on 0.4 mg tamsulosin as home medication.   - TOV today: d/c Harpreet    #glaucoma  -Continue home meds of latanoprost and alphagan.  - consider restarting in event of LUTS
-Patient takes tamulosin 0.4 as a home medication  -Consider starting if signs of urinary retention
-flomax
Mullins placed on 7 Lachman for urinary retention. Pt is on 0.4 mg tamsulosin as home medication.   - TOV today: d/c Harpreet    #glaucoma  -Continue home meds of latanoprost and alphagan.  - consider restarting in event of LUTS
Mullins placed on 7 Lachman for urinary retention. Pt is on 0.4 mg tamsulosin as home medication.   - TOV today: d/c Mullins  - consider restarting in event of LUTS
Mullins placed on 7 Lachman for urinary retention. Pt is on 0.4 mg tamsulosin as home medication. Mullins removed.    #glaucoma  -Continue home meds of latanoprost and alphagan.
S/p seed procedure. Monitor urine output.    #BPH  Mullins placed on 7 Lachman for urinary retention. Pt is on 0.4 mg tamsulosin as home medication. Mullins removed.    #glaucoma  -Continue home meds of latanoprost and alphagan.
-Patient takes tamulosin 0.4 as a home medication  -Consider starting if signs of urinary retention
Mullins placed on 7 Lachman for urinary retention. Pt is on 0.4 mg tamsulosin as home medication.   - TOV today: d/c Mullins  - consider restarting in event of LUTS

## 2019-08-28 NOTE — PROGRESS NOTE ADULT - PROBLEM SELECTOR PLAN 9
F: S/p 2.5 L in ED  E: Replete K <3.5 and Mg > 2  N: Dash
F: s/p 2.5 L in ED  E: replete K <3.5 and Mg>2  N: DASH
-Continue home medication of latanoprost and alphagan
Continue home meds of latanoprost and alphagan.
F: s/p 2.5 L in ED  E: replete K <3.5 and Mg>2  N: DASH
-Continue home medication of latanoprost and alphagan
Continue home meds of latanoprost and alphagan.

## 2019-08-28 NOTE — DISCHARGE NOTE NURSING/CASE MANAGEMENT/SOCIAL WORK - PATIENT PORTAL LINK FT
You can access the FollowMyHealth Patient Portal offered by [facility] by registering at the following website: [email or web address]. By joining [employer]’s FollowMyHealth portal, you will also be able to view your health information using other applications (apps) compatible with our system.

## 2019-08-28 NOTE — PROGRESS NOTE ADULT - PROBLEM SELECTOR PROBLEM 8
BPH (benign prostatic hyperplasia)
Primary angle-closure glaucoma, unspecified glaucoma stage, unspecified laterality, unspecified primary angle-closure glaucoma type
BPH (benign prostatic hyperplasia)
Benign prostatic hyperplasia, unspecified whether lower urinary tract symptoms present
Prostate cancer
Benign prostatic hyperplasia, unspecified whether lower urinary tract symptoms present

## 2019-08-28 NOTE — PROGRESS NOTE ADULT - PROBLEM SELECTOR PROBLEM 1
Sepsis, due to unspecified organism
Sepsis, due to unspecified organism
Discharge planning issues
Sepsis, due to unspecified organism

## 2019-08-28 NOTE — PROGRESS NOTE ADULT - ASSESSMENT
83M w/ PMH multiple myeloma, prostate CA, afib with RVR, DVT admitted for sepsis and Legionella pneumonia, currently on azithromycin 500 mg qd. No longer meeting sepsis criteria or experiencing RVR. Awaiting ERROL placement.

## 2019-08-28 NOTE — PROGRESS NOTE ADULT - PROVIDER SPECIALTY LIST ADULT
Hospitalist
Internal Medicine
Surgery
Internal Medicine

## 2019-08-28 NOTE — PROGRESS NOTE ADULT - PROBLEM SELECTOR PROBLEM 6
Prostate cancer
Hypertension, unspecified type
Multiple myeloma not having achieved remission
Hypertension, unspecified type

## 2019-08-28 NOTE — PROGRESS NOTE ADULT - PROBLEM SELECTOR PLAN 5
-Takes HCTZ 25 mg as an outpatient  -Consider adding diuretic due to pitting bilateral edema  -F/u Cardiology recommendations
Per wife, receiving bortezomib treatment 2 weeks on, 1 week off. Also on dexamethasone as outpatient.   - f/u outpatient heme-onc for recs    #Thrombocytopenia: h/o per PCP. Takes hydroxyurea as outpatient. Platelet count now 239.
-Receiving treatment as an outpatient. 2 weeks on 1 week off, per wife.  -On Decadron as an outpatient  -Obtain collateral from hematology oncology
Found to have RVR during hospital course and was tachy to 140s while on 7 Lachman on telemetry. No ischemic changes on EKG, trops negative x1, no wall motion abnormalities on echo. Started on Lopressor for rate control.   - Continue Lopressor 25 mg BID  - Continue eliquis 5mg
Per wife, receiving bortezomib treatment 2 weeks on, 1 week off. Also on dexamethasone as outpatient.   - f/u outpatient heme-onc for recs    #Thrombocytopenia: h/o per PCP. Takes hydroxyurea as outpatient. Platelet count now 239.
-Receiving treatment as an outpatient. 2 weeks on 1 week off, per wife.  -On Decadron as an outpatient  -Obtain collateral from hematology oncology
Per wife, receiving bortezomib treatment 2 weeks on, 1 week off. Also on dexamethasone as outpatient.   - f/u outpatient heme-onc for recs    #Thrombocytopenia: h/o per PCP. Takes hydroxyurea as outpatient. Platelet count now 239.

## 2019-08-28 NOTE — PROGRESS NOTE ADULT - SUBJECTIVE AND OBJECTIVE BOX
INTERVAL HPI/OVERNIGHT EVENTS: As per nurse and patient, no o/n events. Awaiting ERROL.     SUBJECTIVE: Patient was seen and examined at bedside.  No complaints at this time. Patient denies: fever, chills, dizziness, weakness, HA, Changes in vision, CP, palpitations, SOB, cough, N/V/D/C, dysuria, changes in bowel movements, LE edema. ROS otherwise negative.    VITAL SIGNS:  T(F): 99 (08-28-19 @ 13:48)  HR: 81 (08-28-19 @ 13:48)  BP: 119/71 (08-28-19 @ 13:48)  RR: 18 (08-28-19 @ 13:48)  SpO2: 93% (08-28-19 @ 13:48)  Wt(kg): --    PHYSICAL EXAM:    General: AOx2, NAD, resting comfortably in bed  HEENT: no LAD, moist mucous membranes; no oral thrush   Neck: no LAD, IJ site healing nicely  Cardiac: regular rate and rhythm; normal S1, S2; no m/g/r  Resp: no ronchi, crackles; no accessory muscle use, no retractions  Abd: soft, non-tender, non-distended  Skin: small ulcers at medial thighs bilaterally and base of scrotum, and on buttock; no erythema or purulence      MEDICATIONS  (STANDING):  apixaban 5 milliGRAM(s) Oral two times a day  aspirin enteric coated 81 milliGRAM(s) Oral daily  brimonidine 0.2% Ophthalmic Solution 1 Drop(s) Both EYES daily  hydroxyurea 500 milliGRAM(s) Oral every 12 hours  latanoprost 0.005% Ophthalmic Solution 1 Drop(s) Both EYES at bedtime  metoprolol tartrate 25 milliGRAM(s) Oral two times a day  pantoprazole    Tablet 40 milliGRAM(s) Oral before breakfast  tamsulosin 0.4 milliGRAM(s) Oral at bedtime    MEDICATIONS  (PRN):  acetaminophen   Tablet .. 650 milliGRAM(s) Oral every 6 hours PRN Mild Pain (1 - 3)  calcium carbonate    500 mG (Tums) Chewable 3 Tablet(s) Chew every 6 hours PRN Dyspepsia  melatonin 5 milliGRAM(s) Oral at bedtime PRN Sleep      Allergies    No Known Allergies    Intolerances        LABS:                RADIOLOGY & ADDITIONAL TESTS:  Reviewed

## 2019-08-28 NOTE — DISCHARGE NOTE NURSING/CASE MANAGEMENT/SOCIAL WORK - NSDCFUADDAPPT_GEN_ALL_CORE_FT
Appointment with Dr. Rubin September 3rd at 12pm.    Appointment with Dr. Thomas September 7th at 11:15am

## 2019-08-28 NOTE — PROGRESS NOTE ADULT - PROBLEM SELECTOR PROBLEM 7
Benign prostatic hyperplasia, unspecified whether lower urinary tract symptoms present
Prostate cancer
Prostate cancer
Hypertension, unspecified type
Prostate cancer

## 2019-08-28 NOTE — PROGRESS NOTE ADULT - PROBLEM SELECTOR PLAN 1
Patient awaiting discharge to [facility]. Waiting to hear from insurance

## 2019-08-28 NOTE — DISCHARGE NOTE NURSING/CASE MANAGEMENT/SOCIAL WORK - NSDCPEPAMP_GEN_ALL_CORE
[facility] Surgical [facility]  Progress Note    [facility] Patient Status:  Inpatient    1963 MRN [id number]   Location [facility] 2W/SW Attending Jhonny Evangelista MD   [facility] Day # 2 PCP Abhinav Kilgore MD     Subjective:  Doing Left a message for echo department , echo pending completion -- pt is discharge pending.     Leatha Cintron “[facility]” pamphlet given

## 2019-08-28 NOTE — PROGRESS NOTE ADULT - PROBLEM SELECTOR PROBLEM 5
Hypertension, unspecified type
Multiple myeloma not having achieved remission
Atrial fibrillation, unspecified type
Multiple myeloma not having achieved remission

## 2019-08-28 NOTE — PROGRESS NOTE ADULT - PROBLEM SELECTOR PROBLEM 2
Pneumonia due to infectious organism, unspecified laterality, unspecified part of lung
Sepsis, due to unspecified organism
Pneumonia due to infectious organism, unspecified laterality, unspecified part of lung
Pneumonia due to infectious organism, unspecified laterality, unspecified part of lung

## 2019-08-28 NOTE — PROGRESS NOTE ADULT - PROBLEM SELECTOR PLAN 6
-S/p Seed procedure  -monitor urinary output
Takes Hctz 25 mg as outpatient. BP last 24h 125-151/71-93. Pt had bilateral pitting edema 1+ that has improved from previous documentations of 3+.  - now also on Lopressor 25 mg BID
-Takes HCTZ 25 mg as an outpatient  -Consider adding diuretic due to pitting bilateral edema  -F/u Cardiology recommendations
Per wife, receiving bortezomib treatment 2 weeks on, 1 week off. Also on dexamethasone as outpatient.   - f/u outpatient heme-onc for recs    #Thrombocytopenia: h/o per PCP. Takes hydroxyurea as outpatient. Platelet count now 239.
Takes Hctz 25 mg  - now also on Lopressor 25 mg BID
Takes Hctz 25 mg as outpatient. BP last 24h 125-151/71-93. Pt had bilateral pitting edema 1+ that has improved from previous documentations of 3+.  - now also on Lopressor 25 mg BID
-Takes HCTZ 25 mg as an outpatient  -Consider adding diuretic due to pitting bilateral edema  -F/u Cardiology recommendations
Takes Hctz 25 mg as outpatient. BP last 24h 125-151/71-93. Pt had bilateral pitting edema 1+ that has improved from previous documentations of 3+.  - now also on Lopressor 25 mg BID

## 2019-08-28 NOTE — DISCHARGE NOTE NURSING/CASE MANAGEMENT/SOCIAL WORK - NSDCPEWEB_GEN_ALL_CORE
[place] website --- [email or web address] website --- [email or web address]

## 2019-08-28 NOTE — DISCHARGE NOTE NURSING/CASE MANAGEMENT/SOCIAL WORK - NSDCPEEMAIL_GEN_ALL_CORE
[facility] email [email or web address]

## 2019-08-28 NOTE — PROGRESS NOTE ADULT - PROBLEM SELECTOR PROBLEM 10
Nutrition, metabolism, and development symptoms
Transition of care performed with sharing of clinical summary
Nutrition, metabolism, and development symptoms
Nutrition, metabolism, and development symptoms
Transition of care performed with sharing of clinical summary
Nutrition, metabolism, and development symptoms

## 2019-08-28 NOTE — PROGRESS NOTE ADULT - PROBLEM SELECTOR PLAN 2
Pt presented following progressive lethargy with 3/4 SIRS criteria - tachycardia, febrile, leukocytosis, with LLL infiltrate on CXR. Had lactate 3.0 and met criteria for severe sepsis. Vancomycin and Zosyn started in ED given recent chemotherapy and immunocompromised status. Given 2.5L NS in ED. Lactate since downtrended to 1.4. Urine antigen assay positive for legionella and pt was switched to azithromycin 500 mg qd.   - completed azithromycin

## 2019-08-28 NOTE — PROGRESS NOTE ADULT - PROBLEM SELECTOR PLAN 7
-Patient takes tamulosin 0.4 as a home medication  -Consider starting if signs of urinary retention
S/p seed procedure. Monitor urine output.
-S/p Seed procedure  -monitor urinary output
S/p seed procedure. Monitor urine output.
Takes Hctz 25 mg as outpatient. BP last 24h 125-151/71-93. Pt had bilateral pitting edema 1+ that has improved from previous documentations of 3+.  - now also on Lopressor 25 mg BID
-S/p Seed procedure  -monitor urinary output
S/p seed procedure. Monitor urine output.

## 2019-08-28 NOTE — PROGRESS NOTE ADULT - PROBLEM SELECTOR PROBLEM 9
Glaucoma
Nutrition, metabolism, and development symptoms
Nutrition, metabolism, and development symptoms
Glaucoma
Nutrition, metabolism, and development symptoms
Primary angle-closure glaucoma, unspecified glaucoma stage, unspecified laterality, unspecified primary angle-closure glaucoma type
Primary angle-closure glaucoma, unspecified glaucoma stage, unspecified laterality, unspecified primary angle-closure glaucoma type

## 2019-08-30 DIAGNOSIS — H40.20X0 UNSPECIFIED PRIMARY ANGLE-CLOSURE GLAUCOMA, STAGE UNSPECIFIED: ICD-10-CM

## 2019-08-30 DIAGNOSIS — A41.9 SEPSIS, UNSPECIFIED ORGANISM: ICD-10-CM

## 2019-08-30 DIAGNOSIS — I48.0 PAROXYSMAL ATRIAL FIBRILLATION: ICD-10-CM

## 2019-08-30 DIAGNOSIS — D64.9 ANEMIA, UNSPECIFIED: ICD-10-CM

## 2019-08-30 DIAGNOSIS — C90.00 MULTIPLE MYELOMA NOT HAVING ACHIEVED REMISSION: ICD-10-CM

## 2019-08-30 DIAGNOSIS — A48.1 LEGIONNAIRES' DISEASE: ICD-10-CM

## 2019-08-30 DIAGNOSIS — C61 MALIGNANT NEOPLASM OF PROSTATE: ICD-10-CM

## 2019-08-30 DIAGNOSIS — Z86.718 PERSONAL HISTORY OF OTHER VENOUS THROMBOSIS AND EMBOLISM: ICD-10-CM

## 2019-08-30 DIAGNOSIS — R33.8 OTHER RETENTION OF URINE: ICD-10-CM

## 2019-08-30 DIAGNOSIS — I10 ESSENTIAL (PRIMARY) HYPERTENSION: ICD-10-CM

## 2019-08-30 DIAGNOSIS — N40.1 BENIGN PROSTATIC HYPERPLASIA WITH LOWER URINARY TRACT SYMPTOMS: ICD-10-CM

## 2019-08-30 DIAGNOSIS — R53.1 WEAKNESS: ICD-10-CM

## 2019-10-22 NOTE — PROGRESS NOTE ADULT - PROBLEM/PLAN-5
[FreeTextEntry2] : small bruice above left eyebrow on forehead, small soft tissue swelling about 1.5 cm in diameter, skull intact with no tenderness to touch
[NL] : warm
DISPLAY PLAN FREE TEXT

## 2019-11-20 NOTE — DIETITIAN INITIAL EVALUATION ADULT. - +GENDER
Recent PHQ 2/9 Score    PHQ 2:  Date Adult PHQ 2 Score   11/20/2019 0       PHQ 9:      Statement Selected

## 2019-12-01 ENCOUNTER — EMERGENCY (EMERGENCY)
Facility: HOSPITAL | Age: 84
LOS: 1 days | Discharge: HOME | End: 2019-12-01
Attending: EMERGENCY MEDICINE | Admitting: EMERGENCY MEDICINE
Payer: MEDICARE

## 2019-12-01 VITALS
SYSTOLIC BLOOD PRESSURE: 132 MMHG | TEMPERATURE: 97 F | HEART RATE: 68 BPM | RESPIRATION RATE: 16 BRPM | DIASTOLIC BLOOD PRESSURE: 76 MMHG | OXYGEN SATURATION: 98 %

## 2019-12-01 VITALS
WEIGHT: 137.57 LBS | TEMPERATURE: 98 F | RESPIRATION RATE: 18 BRPM | HEART RATE: 66 BPM | OXYGEN SATURATION: 100 % | DIASTOLIC BLOOD PRESSURE: 61 MMHG | SYSTOLIC BLOOD PRESSURE: 108 MMHG

## 2019-12-01 DIAGNOSIS — Z98.89 OTHER SPECIFIED POSTPROCEDURAL STATES: Chronic | ICD-10-CM

## 2019-12-01 PROCEDURE — 73030 X-RAY EXAM OF SHOULDER: CPT

## 2019-12-01 PROCEDURE — 99283 EMERGENCY DEPT VISIT LOW MDM: CPT

## 2019-12-01 PROCEDURE — 99284 EMERGENCY DEPT VISIT MOD MDM: CPT

## 2019-12-01 PROCEDURE — 73030 X-RAY EXAM OF SHOULDER: CPT | Mod: 26,RT

## 2019-12-01 RX ORDER — ACETAMINOPHEN 500 MG
650 TABLET ORAL ONCE
Refills: 0 | Status: COMPLETED | OUTPATIENT
Start: 2019-12-01 | End: 2019-12-01

## 2019-12-01 RX ADMIN — Medication 650 MILLIGRAM(S): at 11:33

## 2019-12-01 RX ADMIN — Medication 650 MILLIGRAM(S): at 00:00

## 2019-12-01 NOTE — ED PROVIDER NOTE - OBJECTIVE STATEMENT
84M from [facility] rehab ([phone]) and nursing PMH multiple myeloma, prostate ca (s/p SEED therapy), pAfib, recurrent DVTs, dementia, BPH, PVD p/w per paperwork: "s/p fall, to r/o fx."  Called rehab: SARAY roth: Yesterday 6pm pt was found on floor in room next to bed. Usually ambulatory w/o any cane. Portable XR supposed to come this morning but didn't so sent to ER.   meds: eliquis, tylenol prn, asa, eye gtt, hydroxyurea, melatonin, metoprolol, omeprazole, flomax, vit Bm zyprexa 84M from [facility] rehab ([phone]) and nursing [facility] multiple myeloma, prostate ca (s/p SEED therapy), pAfib, recurrent DVTs, dementia, BPH, PVD p/w per paperwork: "s/p fall, to r/o fx."   Called rehab: Per RN ira: Yesterday 6pm pt was found on floor in room next to bed. Usually ambulatory w/o any cane. Portable XR supposed to come tp rehab this morning but didn't so sent to ER.   Per pt - at home he uses walker but he doesn't use walker at the rehab. Has had several falls. States he was trying to walk to bathroom w/o any cane and just fell. Denies hitting head, LOC, vision changes, focal weakness/numbness, neck/back pain, SOB/CP, HA, abd pain, NVD, black/bloody stool, urinary complaints, URI symptoms.   meds: eliquis, tylenol prn, asa, eye gtt, hydroxyurea, melatonin, metoprolol, omeprazole, flomax, vit Bm zyprexa

## 2019-12-01 NOTE — ED PROVIDER NOTE - PATIENT PORTAL LINK FT
You can access the FollowMyHealth Patient Portal offered by [facility] by registering at the following website: [email or web address]. By joining [facility]’s FollowMyHealth portal, you will also be able to view your health information using other applications (apps) compatible with our system.

## 2019-12-01 NOTE — ED ADULT NURSE NOTE - CHPI ED NUR SYMPTOMS NEG
no fever/no tingling/no numbness/no vomiting/no weakness/no deformity/no abrasion/no bleeding/no loss of consciousness

## 2019-12-01 NOTE — ED ADULT NURSE NOTE - OBJECTIVE STATEMENT
Patient states was walking yesterday when fell, denies feeling dizzy or lighheaded, denies hitting head, c/o of right shoulder and right hip pain, states does not use a walker or cane when walking.  No obvious deformity noted.

## 2019-12-01 NOTE — ED ADULT NURSE REASSESSMENT NOTE - NS ED NURSE REASSESS COMMENT FT1
Patient asleep, does not appear to be in any pain or distress s/p Tylenol PO, vital signs stable.  Xray done, results pending.  Fall precaution observed.

## 2019-12-01 NOTE — ED PROVIDER NOTE - NSFOLLOWUPINSTRUCTIONS_ED_ALL_ED_FT
Can take tylenol 650mg every 6hrs as needed for pain or fever.  Patient would likely benefit from physical therapy evaluation and possibly a walker.   Stay well hydrated.  Return for fevers, persistent vomit, uncontrolled pain, worsening breathing, worsening lightheaded.  Follow up with primary doctor within 1-2 days.   Follow up with orthopedist for persistent pain.

## 2019-12-01 NOTE — ED PROVIDER NOTE - PHYSICAL EXAMINATION
Slightly decreased ROM R shoulder and R knee (denies any R knee pain). +Minimal R proximal humerus ttp. no overlying skin changes. No spinal ttp, neck FROM. Strength 5/5. No other bony ttp, FROM all other extremities. Normal equal distal pulses. sensation intact.   [place]

## 2019-12-01 NOTE — ED ADULT NURSE NOTE - CHIEF COMPLAINT QUOTE
patient BIBA from [facility] s/p fall yesterday. as per EMS unknown LOC. patient complains of right shoulder and hip pain. pain when raising arm. denies headache, dizziness. patient is on eliquis

## 2019-12-01 NOTE — ED ADULT NURSE NOTE - NSIMPLEMENTINTERV_GEN_ALL_ED
Implemented All Fall Risk Interventions:  [place] to call system. Call bell, personal items and telephone within reach. Instruct patient to call for assistance. Room bathroom lighting operational. Non-slip footwear when patient is off stretcher. Physically safe environment: no spills, clutter or unnecessary equipment. Stretcher in lowest position, wheels locked, appropriate side rails in place. Provide visual cue, wrist band, yellow gown, etc. Monitor gait and stability. Monitor for mental status changes and reorient to person, place, and time. Review medications for side effects contributing to fall risk. Reinforce activity limits and safety measures with patient and family.

## 2019-12-01 NOTE — ED PROVIDER NOTE - CLINICAL SUMMARY MEDICAL DECISION MAKING FREE TEXT BOX
84M from [facility] rehab ([phone]) and nursing [facility] multiple myeloma, prostate ca (s/p SEED therapy), pAfib, recurrent DVTs, dementia, BPH, PVD p/w fall yesterday while attempting to walk w/o cane. Pt feels like his baseline w/o any symptoms. Rehab concerned for fx and sent to ER. Vitals wnl, exam as above.   ddx: Likely mechanical fall. Possible fx.  XR, symptom control, reassess.

## 2019-12-01 NOTE — ED ADULT NURSE REASSESSMENT NOTE - NS ED NURSE REASSESS COMMENT FT1
Xray resulted, no pain or other symptom complaint, vital signs stable, discharged back to nursing home.  Transport pending.

## 2019-12-01 NOTE — ED ADULT NURSE REASSESSMENT NOTE - NS ED NURSE REASSESS COMMENT FT1
Patient oriented to self, place and situation, disoriented to time, c/o of right shoulder and hip pain, w/ decreased ROM to right arm and hip, tenderness on palpation of right shoulder.  No obvious deformity noted.  Vital signs stable.  Tylenol 650mg PO adminsitered. Brought to Xray.  Fall precaution observed.

## 2019-12-01 NOTE — ED PROVIDER NOTE - PROGRESS NOTE DETAILS
[place]: XR no fx. Pt remains feeling like normal self. Clinically no indication for further emergent ED workup or hospitalization at this time. d/w ?collin from rehab, will Can take tylenol 650mg AND/OR motrin 600mg every 6hrs as needed for pain.  Follow up with primary doctor within 1-2 days.  Return for persistent fever/vomit, uncontrolled pain, focal weakness/numbness, worsening dizziness.   Follow up with spine specialist for persistent pain.. discussed importance of adequate supervision/gait assistance. Nathalia: updated wife at bedside. confirms that pt uses walker at home.

## 2019-12-01 NOTE — ED ADULT TRIAGE NOTE - CHIEF COMPLAINT QUOTE
patient BIBA from [facility] s/p fall yesterday. as per EMS unknown LOC. patient complains of right shoulder and hip pain. pain when raising arm. denies headache, dizziness. patient BIBA from [facility] s/p fall yesterday. as per EMS unknown LOC. patient complains of right shoulder and hip pain. pain when raising arm. denies headache, dizziness. patient is on eliquis

## 2019-12-06 DIAGNOSIS — M25.511 PAIN IN RIGHT SHOULDER: ICD-10-CM

## 2019-12-09 NOTE — PATIENT PROFILE ADULT - NSPROPTRIGHTBILLOFRIGHTS_GEN_A_NUR
Patient's  called requesting appt for his wife to follow up with Dr. Corey Salvador for medications and general issues. Patient is also experiencing cold symptoms for past week. Requesting early am appt only.   First early am appt he can bring her is 12/
patient

## 2020-02-22 ENCOUNTER — EMERGENCY (EMERGENCY)
Facility: HOSPITAL | Age: 85
LOS: 1 days | Discharge: HOME | End: 2020-02-22
Attending: EMERGENCY MEDICINE | Admitting: EMERGENCY MEDICINE
Payer: COMMERCIAL

## 2020-02-22 VITALS
DIASTOLIC BLOOD PRESSURE: 76 MMHG | OXYGEN SATURATION: 96 % | HEART RATE: 74 BPM | RESPIRATION RATE: 20 BRPM | SYSTOLIC BLOOD PRESSURE: 126 MMHG | TEMPERATURE: 98 F

## 2020-02-22 DIAGNOSIS — Z98.89 OTHER SPECIFIED POSTPROCEDURAL STATES: Chronic | ICD-10-CM

## 2020-02-22 LAB
ALBUMIN SERPL ELPH-MCNC: 3.2 G/DL — LOW (ref 3.3–5)
ALBUMIN SERPL ELPH-MCNC: 3.3 G/DL — SIGNIFICANT CHANGE UP (ref 3.3–5)
ALP SERPL-CCNC: 46 U/L — SIGNIFICANT CHANGE UP (ref 40–120)
ALP SERPL-CCNC: SIGNIFICANT CHANGE UP U/L (ref 40–120)
ALT FLD-CCNC: 10 U/L — SIGNIFICANT CHANGE UP (ref 10–45)
ALT FLD-CCNC: SIGNIFICANT CHANGE UP U/L (ref 10–45)
ANION GAP SERPL CALC-SCNC: 10 MMOL/L — SIGNIFICANT CHANGE UP (ref 5–17)
ANION GAP SERPL CALC-SCNC: 10 MMOL/L — SIGNIFICANT CHANGE UP (ref 5–17)
APPEARANCE UR: ABNORMAL
AST SERPL-CCNC: 25 U/L — SIGNIFICANT CHANGE UP (ref 10–40)
AST SERPL-CCNC: SIGNIFICANT CHANGE UP U/L (ref 10–40)
BACTERIA # UR AUTO: PRESENT /HPF
BASOPHILS # BLD AUTO: 0.02 K/UL — SIGNIFICANT CHANGE UP (ref 0–0.2)
BASOPHILS NFR BLD AUTO: 0.3 % — SIGNIFICANT CHANGE UP (ref 0–2)
BILIRUB SERPL-MCNC: 0.4 MG/DL — SIGNIFICANT CHANGE UP (ref 0.2–1.2)
BILIRUB SERPL-MCNC: 0.4 MG/DL — SIGNIFICANT CHANGE UP (ref 0.2–1.2)
BILIRUB UR-MCNC: NEGATIVE — SIGNIFICANT CHANGE UP
BUN SERPL-MCNC: 18 MG/DL — SIGNIFICANT CHANGE UP (ref 7–23)
BUN SERPL-MCNC: 19 MG/DL — SIGNIFICANT CHANGE UP (ref 7–23)
CALCIUM SERPL-MCNC: 8.8 MG/DL — SIGNIFICANT CHANGE UP (ref 8.4–10.5)
CALCIUM SERPL-MCNC: 9.1 MG/DL — SIGNIFICANT CHANGE UP (ref 8.4–10.5)
CHLORIDE SERPL-SCNC: 101 MMOL/L — SIGNIFICANT CHANGE UP (ref 96–108)
CHLORIDE SERPL-SCNC: 104 MMOL/L — SIGNIFICANT CHANGE UP (ref 96–108)
CO2 SERPL-SCNC: 23 MMOL/L — SIGNIFICANT CHANGE UP (ref 22–31)
CO2 SERPL-SCNC: 23 MMOL/L — SIGNIFICANT CHANGE UP (ref 22–31)
COLOR SPEC: YELLOW — SIGNIFICANT CHANGE UP
COMMENT - URINE: SIGNIFICANT CHANGE UP
COMMENT - URINE: SIGNIFICANT CHANGE UP
CREAT SERPL-MCNC: 0.89 MG/DL — SIGNIFICANT CHANGE UP (ref 0.5–1.3)
CREAT SERPL-MCNC: 0.95 MG/DL — SIGNIFICANT CHANGE UP (ref 0.5–1.3)
DIFF PNL FLD: ABNORMAL
EOSINOPHIL # BLD AUTO: 0.02 K/UL — SIGNIFICANT CHANGE UP (ref 0–0.5)
EOSINOPHIL NFR BLD AUTO: 0.3 % — SIGNIFICANT CHANGE UP (ref 0–6)
EPI CELLS # UR: SIGNIFICANT CHANGE UP /HPF (ref 0–5)
GLUCOSE SERPL-MCNC: 119 MG/DL — HIGH (ref 70–99)
GLUCOSE SERPL-MCNC: 120 MG/DL — HIGH (ref 70–99)
GLUCOSE UR QL: NEGATIVE — SIGNIFICANT CHANGE UP
HCT VFR BLD CALC: 30.6 % — LOW (ref 39–50)
HGB BLD-MCNC: 9.3 G/DL — LOW (ref 13–17)
HYALINE CASTS # UR AUTO: SIGNIFICANT CHANGE UP /LPF (ref 0–2)
IMM GRANULOCYTES NFR BLD AUTO: 1.4 % — SIGNIFICANT CHANGE UP (ref 0–1.5)
KETONES UR-MCNC: NEGATIVE — SIGNIFICANT CHANGE UP
LEUKOCYTE ESTERASE UR-ACNC: ABNORMAL
LIDOCAIN IGE QN: 22 U/L — SIGNIFICANT CHANGE UP (ref 7–60)
LYMPHOCYTES # BLD AUTO: 1.97 K/UL — SIGNIFICANT CHANGE UP (ref 1–3.3)
LYMPHOCYTES # BLD AUTO: 33.3 % — SIGNIFICANT CHANGE UP (ref 13–44)
MCHC RBC-ENTMCNC: 28.2 PG — SIGNIFICANT CHANGE UP (ref 27–34)
MCHC RBC-ENTMCNC: 30.4 GM/DL — LOW (ref 32–36)
MCV RBC AUTO: 92.7 FL — SIGNIFICANT CHANGE UP (ref 80–100)
MONOCYTES # BLD AUTO: 0.9 K/UL — SIGNIFICANT CHANGE UP (ref 0–0.9)
MONOCYTES NFR BLD AUTO: 15.2 % — HIGH (ref 2–14)
NEUTROPHILS # BLD AUTO: 2.93 K/UL — SIGNIFICANT CHANGE UP (ref 1.8–7.4)
NEUTROPHILS NFR BLD AUTO: 49.5 % — SIGNIFICANT CHANGE UP (ref 43–77)
NITRITE UR-MCNC: NEGATIVE — SIGNIFICANT CHANGE UP
NRBC # BLD: 0 /100 WBCS — SIGNIFICANT CHANGE UP (ref 0–0)
PH UR: 6 — SIGNIFICANT CHANGE UP (ref 5–8)
PLATELET # BLD AUTO: 189 K/UL — SIGNIFICANT CHANGE UP (ref 150–400)
POTASSIUM SERPL-MCNC: 4.4 MMOL/L — SIGNIFICANT CHANGE UP (ref 3.5–5.3)
POTASSIUM SERPL-MCNC: SIGNIFICANT CHANGE UP MMOL/L (ref 3.5–5.3)
POTASSIUM SERPL-SCNC: 4.4 MMOL/L — SIGNIFICANT CHANGE UP (ref 3.5–5.3)
POTASSIUM SERPL-SCNC: SIGNIFICANT CHANGE UP MMOL/L (ref 3.5–5.3)
PROT SERPL-MCNC: 6.8 G/DL — SIGNIFICANT CHANGE UP (ref 6–8.3)
PROT SERPL-MCNC: 7.4 G/DL — SIGNIFICANT CHANGE UP (ref 6–8.3)
PROT UR-MCNC: 30 MG/DL
RBC # BLD: 3.3 M/UL — LOW (ref 4.2–5.8)
RBC # FLD: 18.8 % — HIGH (ref 10.3–14.5)
RBC CASTS # UR COMP ASSIST: >20 /HPF — SIGNIFICANT CHANGE UP
SODIUM SERPL-SCNC: 134 MMOL/L — LOW (ref 135–145)
SODIUM SERPL-SCNC: 137 MMOL/L — SIGNIFICANT CHANGE UP (ref 135–145)
SP GR SPEC: 1.02 — SIGNIFICANT CHANGE UP (ref 1–1.03)
UROBILINOGEN FLD QL: 0.2 E.U./DL — SIGNIFICANT CHANGE UP
WBC # BLD: 5.92 K/UL — SIGNIFICANT CHANGE UP (ref 3.8–10.5)
WBC # FLD AUTO: 5.92 K/UL — SIGNIFICANT CHANGE UP (ref 3.8–10.5)
WBC UR QL: >20 /HPF — SIGNIFICANT CHANGE UP

## 2020-02-22 PROCEDURE — 99284 EMERGENCY DEPT VISIT MOD MDM: CPT | Mod: 25

## 2020-02-22 PROCEDURE — 87086 URINE CULTURE/COLONY COUNT: CPT

## 2020-02-22 PROCEDURE — 96365 THER/PROPH/DIAG IV INF INIT: CPT | Mod: XU

## 2020-02-22 PROCEDURE — 99285 EMERGENCY DEPT VISIT HI MDM: CPT

## 2020-02-22 PROCEDURE — 36415 COLL VENOUS BLD VENIPUNCTURE: CPT

## 2020-02-22 PROCEDURE — 74177 CT ABD & PELVIS W/CONTRAST: CPT

## 2020-02-22 PROCEDURE — 80053 COMPREHEN METABOLIC PANEL: CPT

## 2020-02-22 PROCEDURE — 85025 COMPLETE CBC W/AUTO DIFF WBC: CPT

## 2020-02-22 PROCEDURE — 74177 CT ABD & PELVIS W/CONTRAST: CPT | Mod: 26

## 2020-02-22 PROCEDURE — 81001 URINALYSIS AUTO W/SCOPE: CPT

## 2020-02-22 PROCEDURE — 83690 ASSAY OF LIPASE: CPT

## 2020-02-22 RX ORDER — IOHEXOL 300 MG/ML
30 INJECTION, SOLUTION INTRAVENOUS ONCE
Refills: 0 | Status: COMPLETED | OUTPATIENT
Start: 2020-02-22 | End: 2020-02-22

## 2020-02-22 RX ORDER — CEFTRIAXONE 500 MG/1
1000 INJECTION, POWDER, FOR SOLUTION INTRAMUSCULAR; INTRAVENOUS ONCE
Refills: 0 | Status: COMPLETED | OUTPATIENT
Start: 2020-02-22 | End: 2020-02-22

## 2020-02-22 RX ADMIN — CEFTRIAXONE 100 MILLIGRAM(S): 500 INJECTION, POWDER, FOR SOLUTION INTRAMUSCULAR; INTRAVENOUS at 20:24

## 2020-02-22 RX ADMIN — CEFTRIAXONE 1000 MILLIGRAM(S): 500 INJECTION, POWDER, FOR SOLUTION INTRAMUSCULAR; INTRAVENOUS at 21:00

## 2020-02-22 RX ADMIN — IOHEXOL 30 MILLILITER(S): 300 INJECTION, SOLUTION INTRAVENOUS at 17:11

## 2020-02-22 NOTE — ED ADULT NURSE REASSESSMENT NOTE - NS ED NURSE REASSESS COMMENT FT1
pt in stretcher drinking oral contrast to prep for CT with the aide of wife at bedside. No change in pain at this time.

## 2020-02-22 NOTE — ED PROVIDER NOTE - CLINICAL SUMMARY MEDICAL DECISION MAKING FREE TEXT BOX
lower abdominal / penile pain past few days after díaz placement.  tender suprapubic but díaz in place, draining yellow urine, no bleeding.  labs/ ct ordered and significant for normal wbc/ renal function but imaging with significant cystitis that correlates with pain.  will treat with antibiotic. ceftriaxone given.  no systemic symptoms. lower abdominal / penile pain past few days after díaz placement.  tender suprapubic but díaz in place, draining yellow urine, no bleeding.  labs/ ct ordered and significant for normal wbc/ renal function but imaging with significant cystitis that correlates with pain.  will treat with antibiotic. ceftriaxone given.  no systemic symptoms.  dc to [facility] on cefpodoxime.  return precautions discussed

## 2020-02-22 NOTE — ED PROVIDER NOTE - PATIENT PORTAL LINK FT
You can access the FollowMyHealth Patient Portal offered by [facility] by registering at the following website: [email or web address]. By joining [employer]’s FollowMyHealth portal, you will also be able to view your health information using other applications (apps) compatible with our system.

## 2020-02-22 NOTE — ED ADULT NURSE NOTE - CHIEF COMPLAINT QUOTE
PT BIBA from [facility], c/o penile pain. as per EMS  "a Mullins Catheter was placed 3 days ago and now pt has pain, Mullins draining well.  "

## 2020-02-22 NOTE — ED PROVIDER NOTE - OBJECTIVE STATEMENT
here with hematuria after díaz placement 3 days ago followed by lower abdominal pain and pain in penile area.  Hematuria has since resolved but pain persists.  Denies fever/chills.  Says it "hurts all over down there."  Denies similar pain in past.

## 2020-02-22 NOTE — ED ADULT NURSE NOTE - OBJECTIVE STATEMENT
83 yo M pmhx dementia, BPH, prostate CA presents to ED co R groin/ suprapubic pain s/p díaz change Friday evening. Díaz catheter connected and draining urine upon arrival, no bleeding noted in drainage bag, small brown clod noted in tubing. upon assessment abdomen is nondistended and 83 yo M pmhx dementia, BPH, prostate CA presents to ED co R groin/ suprapubic pain accompanied with burnin sensation in urethra s/p díaz change Friday evening. Díaz catheter connected and draining urine upon arrival, no bleeding noted in drainage bag, small brown clod noted in tubing. upon assessment abdomen is nondistended, tenderness to suprapubic area and R groin. Pt and wife at bedside denies recent fevers/ chills, blood in díza tubing, changes in bowel patterns.

## 2020-02-22 NOTE — ED PROVIDER NOTE - NSFOLLOWUPINSTRUCTIONS_ED_ALL_ED_FT
Your tests today show infection in your urine/ bladder.  You were given a dose of ceftriaxone and should continue cefpodoxime 100mg twice a day for next 7 days.  Please see your primary care provider.   Return to the ER if symptoms worsen or other concerns.    Urinary Tract Infection in Men    WHAT YOU NEED TO KNOW:    A urinary tract infection (UTI) is caused by bacteria that get inside your urinary tract. Most bacteria that enter your urinary tract come out when you urinate. If the bacteria stay in your urinary tract, you may get an infection. Your urinary tract includes your kidneys, ureters, bladder, and urethra. Urine is made in your kidneys, and it flows from the ureters to the bladder. Urine leaves the bladder through the urethra. A UTI is more common in your lower urinary tract, which includes your bladder and urethra.          DISCHARGE INSTRUCTIONS:    Return to the emergency department if:     You are urinating very little or not at all.      You have a high fever with shaking chills.       You have side or back pain that gets worse.    Contact your healthcare provider if:     You have a mild fever.      You do not feel better after 2 days of taking antibiotics.      You are vomiting.       You have new symptoms, such as blood or pus in your urine.       You have questions or concerns about your condition or care.    Medicines:     Antibiotics help fight a bacterial infection.       Medicines may be given to decrease pain and burning when you urinate. They will also help decrease the feeling that you need to urinate often. These medicines will make your urine orange or red.      Take your medicine as directed. Contact your healthcare provider if you think your medicine is not helping or if you have side effects. Tell him of her if you are allergic to any medicine. Keep a list of the medicines, vitamins, and herbs you take. Include the amounts, and when and why you take them. Bring the list or the pill bottles to follow-up visits. Carry your medicine list with you in case of an emergency.    Prevent another UTI:     Empty your bladder often. Urinate and empty your bladder as soon as you feel the need. Do not hold your urine for long periods of time.      Drink liquids as directed. Ask how much liquid to drink each day and which liquids are best for you. You may need to drink more liquids than usual to help flush out the bacteria. Do not drink alcohol, caffeine, or citrus juices. These can irritate your bladder and increase your symptoms. Your healthcare provider may recommend cranberry juice to help prevent a UTI.      Urinate after you have sex. This can help flush out bacteria passed during sex.      Do pelvic muscle exercises often. Pelvic muscle exercises may help you start and stop urinating. Strong pelvic muscles may help you empty your bladder easier. Squeeze these muscles tightly for 5 seconds like you are trying to hold back urine. Then relax for 5 seconds. Gradually work up to squeezing for 10 seconds. Do 3 sets of 15 repetitions a day, or as directed.    Follow up with your healthcare provider as directed: Write down your questions so you remember to ask them during your visits.

## 2020-02-22 NOTE — ED ADULT NURSE NOTE - NSIMPLEMENTINTERV_GEN_ALL_ED
Implemented All Universal Safety Interventions:  [place] to call system. Call bell, personal items and telephone within reach. Instruct patient to call for assistance. Room bathroom lighting operational. Non-slip footwear when patient is off stretcher. Physically safe environment: no spills, clutter or unnecessary equipment. Stretcher in lowest position, wheels locked, appropriate side rails in place.

## 2020-02-22 NOTE — ED ADULT TRIAGE NOTE - CHIEF COMPLAINT QUOTE
PT BIBA from [facility], c/o penile pain. as per EMS  "a Mullins Catheter was placed 3 days ago and now pt has pain, Mullins draining well.  "

## 2020-02-23 VITALS
SYSTOLIC BLOOD PRESSURE: 165 MMHG | DIASTOLIC BLOOD PRESSURE: 85 MMHG | OXYGEN SATURATION: 99 % | HEART RATE: 82 BPM | RESPIRATION RATE: 16 BRPM

## 2020-02-24 LAB
CULTURE RESULTS: NO GROWTH — SIGNIFICANT CHANGE UP
SPECIMEN SOURCE: SIGNIFICANT CHANGE UP

## 2020-02-27 DIAGNOSIS — N30.90 CYSTITIS, UNSPECIFIED WITHOUT HEMATURIA: ICD-10-CM

## 2020-02-27 DIAGNOSIS — R10.30 LOWER ABDOMINAL PAIN, UNSPECIFIED: ICD-10-CM

## 2020-03-15 ENCOUNTER — INPATIENT (INPATIENT)
Facility: HOSPITAL | Age: 85
LOS: 8 days | Discharge: NURSING, REHAB OR LONG-TERM CARE | DRG: 864 | End: 2020-03-24
Attending: INTERNAL MEDICINE | Admitting: INTERNAL MEDICINE
Payer: MEDICARE

## 2020-03-15 VITALS
DIASTOLIC BLOOD PRESSURE: 60 MMHG | OXYGEN SATURATION: 100 % | HEART RATE: 93 BPM | SYSTOLIC BLOOD PRESSURE: 126 MMHG | RESPIRATION RATE: 18 BRPM

## 2020-03-15 DIAGNOSIS — C61 MALIGNANT NEOPLASM OF PROSTATE: ICD-10-CM

## 2020-03-15 DIAGNOSIS — I82.409 ACUTE EMBOLISM AND THROMBOSIS OF UNSPECIFIED DEEP VEINS OF UNSPECIFIED LOWER EXTREMITY: ICD-10-CM

## 2020-03-15 DIAGNOSIS — Z29.9 ENCOUNTER FOR PROPHYLACTIC MEASURES, UNSPECIFIED: ICD-10-CM

## 2020-03-15 DIAGNOSIS — C90.00 MULTIPLE MYELOMA NOT HAVING ACHIEVED REMISSION: ICD-10-CM

## 2020-03-15 DIAGNOSIS — R63.8 OTHER SYMPTOMS AND SIGNS CONCERNING FOOD AND FLUID INTAKE: ICD-10-CM

## 2020-03-15 DIAGNOSIS — H40.9 UNSPECIFIED GLAUCOMA: ICD-10-CM

## 2020-03-15 DIAGNOSIS — N40.0 BENIGN PROSTATIC HYPERPLASIA WITHOUT LOWER URINARY TRACT SYMPTOMS: ICD-10-CM

## 2020-03-15 DIAGNOSIS — Z98.89 OTHER SPECIFIED POSTPROCEDURAL STATES: Chronic | ICD-10-CM

## 2020-03-15 DIAGNOSIS — Z91.89 OTHER SPECIFIED PERSONAL RISK FACTORS, NOT ELSEWHERE CLASSIFIED: ICD-10-CM

## 2020-03-15 LAB
ALBUMIN SERPL ELPH-MCNC: 3.2 G/DL — LOW (ref 3.3–5)
ALP SERPL-CCNC: 51 U/L — SIGNIFICANT CHANGE UP (ref 40–120)
ALT FLD-CCNC: 10 U/L — SIGNIFICANT CHANGE UP (ref 10–45)
ANION GAP SERPL CALC-SCNC: 10 MMOL/L — SIGNIFICANT CHANGE UP (ref 5–17)
APPEARANCE UR: CLEAR — SIGNIFICANT CHANGE UP
APTT BLD: 35.4 SEC — SIGNIFICANT CHANGE UP (ref 27.5–36.3)
AST SERPL-CCNC: 18 U/L — SIGNIFICANT CHANGE UP (ref 10–40)
BASOPHILS # BLD AUTO: 0.05 K/UL — SIGNIFICANT CHANGE UP (ref 0–0.2)
BASOPHILS NFR BLD AUTO: 0.8 % — SIGNIFICANT CHANGE UP (ref 0–2)
BILIRUB SERPL-MCNC: 0.3 MG/DL — SIGNIFICANT CHANGE UP (ref 0.2–1.2)
BILIRUB UR-MCNC: NEGATIVE — SIGNIFICANT CHANGE UP
BUN SERPL-MCNC: 30 MG/DL — HIGH (ref 7–23)
CALCIUM SERPL-MCNC: 8.9 MG/DL — SIGNIFICANT CHANGE UP (ref 8.4–10.5)
CHLORIDE SERPL-SCNC: 104 MMOL/L — SIGNIFICANT CHANGE UP (ref 96–108)
CO2 SERPL-SCNC: 23 MMOL/L — SIGNIFICANT CHANGE UP (ref 22–31)
COLOR SPEC: YELLOW — SIGNIFICANT CHANGE UP
CREAT SERPL-MCNC: 1.16 MG/DL — SIGNIFICANT CHANGE UP (ref 0.5–1.3)
CRP SERPL-MCNC: 3.91 MG/DL — HIGH (ref 0–0.4)
DIFF PNL FLD: ABNORMAL
EOSINOPHIL # BLD AUTO: 0 K/UL — SIGNIFICANT CHANGE UP (ref 0–0.5)
EOSINOPHIL NFR BLD AUTO: 0 % — SIGNIFICANT CHANGE UP (ref 0–6)
GLUCOSE SERPL-MCNC: 157 MG/DL — HIGH (ref 70–99)
GLUCOSE UR QL: NEGATIVE — SIGNIFICANT CHANGE UP
HCT VFR BLD CALC: 25.6 % — LOW (ref 39–50)
HGB BLD-MCNC: 7.9 G/DL — LOW (ref 13–17)
INR BLD: 1.83 — HIGH (ref 0.88–1.16)
KETONES UR-MCNC: NEGATIVE — SIGNIFICANT CHANGE UP
LACTATE SERPL-SCNC: 1.4 MMOL/L — SIGNIFICANT CHANGE UP (ref 0.5–2)
LDH SERPL L TO P-CCNC: 241 U/L — SIGNIFICANT CHANGE UP (ref 50–242)
LEUKOCYTE ESTERASE UR-ACNC: ABNORMAL
LYMPHOCYTES # BLD AUTO: 1.46 K/UL — SIGNIFICANT CHANGE UP (ref 1–3.3)
LYMPHOCYTES # BLD AUTO: 24.2 % — SIGNIFICANT CHANGE UP (ref 13–44)
MCHC RBC-ENTMCNC: 28.1 PG — SIGNIFICANT CHANGE UP (ref 27–34)
MCHC RBC-ENTMCNC: 30.9 GM/DL — LOW (ref 32–36)
MCV RBC AUTO: 91.1 FL — SIGNIFICANT CHANGE UP (ref 80–100)
MONOCYTES # BLD AUTO: 0.6 K/UL — SIGNIFICANT CHANGE UP (ref 0–0.9)
MONOCYTES NFR BLD AUTO: 10 % — SIGNIFICANT CHANGE UP (ref 2–14)
NEUTROPHILS # BLD AUTO: 3.82 K/UL — SIGNIFICANT CHANGE UP (ref 1.8–7.4)
NEUTROPHILS NFR BLD AUTO: 61.6 % — SIGNIFICANT CHANGE UP (ref 43–77)
NITRITE UR-MCNC: POSITIVE
NRBC # BLD: SIGNIFICANT CHANGE UP /100 WBCS (ref 0–0)
PH UR: 6.5 — SIGNIFICANT CHANGE UP (ref 5–8)
PLATELET # BLD AUTO: 175 K/UL — SIGNIFICANT CHANGE UP (ref 150–400)
POTASSIUM SERPL-MCNC: 5 MMOL/L — SIGNIFICANT CHANGE UP (ref 3.5–5.3)
POTASSIUM SERPL-SCNC: 5 MMOL/L — SIGNIFICANT CHANGE UP (ref 3.5–5.3)
PROCALCITONIN SERPL-MCNC: 0.36 NG/ML — HIGH (ref 0.02–0.1)
PROT SERPL-MCNC: 7.1 G/DL — SIGNIFICANT CHANGE UP (ref 6–8.3)
PROT UR-MCNC: 30 MG/DL
PROTHROM AB SERPL-ACNC: 21.2 SEC — HIGH (ref 10–12.9)
RAPID RVP RESULT: SIGNIFICANT CHANGE UP
RBC # BLD: 2.81 M/UL — LOW (ref 4.2–5.8)
RBC # FLD: 19.3 % — HIGH (ref 10.3–14.5)
SODIUM SERPL-SCNC: 137 MMOL/L — SIGNIFICANT CHANGE UP (ref 135–145)
SP GR SPEC: 1.01 — SIGNIFICANT CHANGE UP (ref 1–1.03)
UROBILINOGEN FLD QL: 0.2 E.U./DL — SIGNIFICANT CHANGE UP
WBC # BLD: 6.04 K/UL — SIGNIFICANT CHANGE UP (ref 3.8–10.5)
WBC # FLD AUTO: 6.04 K/UL — SIGNIFICANT CHANGE UP (ref 3.8–10.5)

## 2020-03-15 PROCEDURE — 93010 ELECTROCARDIOGRAM REPORT: CPT

## 2020-03-15 PROCEDURE — 99285 EMERGENCY DEPT VISIT HI MDM: CPT

## 2020-03-15 PROCEDURE — 71045 X-RAY EXAM CHEST 1 VIEW: CPT | Mod: 26

## 2020-03-15 RX ORDER — HYDROXYUREA 500 MG/1
500 CAPSULE ORAL EVERY 12 HOURS
Refills: 0 | Status: DISCONTINUED | OUTPATIENT
Start: 2020-03-15 | End: 2020-03-24

## 2020-03-15 RX ORDER — PANTOPRAZOLE SODIUM 20 MG/1
40 TABLET, DELAYED RELEASE ORAL
Refills: 0 | Status: DISCONTINUED | OUTPATIENT
Start: 2020-03-15 | End: 2020-03-24

## 2020-03-15 RX ORDER — ACETAMINOPHEN 500 MG
650 TABLET ORAL EVERY 6 HOURS
Refills: 0 | Status: DISCONTINUED | OUTPATIENT
Start: 2020-03-15 | End: 2020-03-24

## 2020-03-15 RX ORDER — INFLUENZA VIRUS VACCINE 15; 15; 15; 15 UG/.5ML; UG/.5ML; UG/.5ML; UG/.5ML
0.5 SUSPENSION INTRAMUSCULAR ONCE
Refills: 0 | Status: COMPLETED | OUTPATIENT
Start: 2020-03-15 | End: 2020-03-15

## 2020-03-15 RX ORDER — PIPERACILLIN AND TAZOBACTAM 4; .5 G/20ML; G/20ML
3.38 INJECTION, POWDER, LYOPHILIZED, FOR SOLUTION INTRAVENOUS ONCE
Refills: 0 | Status: COMPLETED | OUTPATIENT
Start: 2020-03-15 | End: 2020-03-15

## 2020-03-15 RX ORDER — LATANOPROST 0.05 MG/ML
1 SOLUTION/ DROPS OPHTHALMIC; TOPICAL AT BEDTIME
Refills: 0 | Status: DISCONTINUED | OUTPATIENT
Start: 2020-03-15 | End: 2020-03-24

## 2020-03-15 RX ORDER — LANOLIN ALCOHOL/MO/W.PET/CERES
5 CREAM (GRAM) TOPICAL AT BEDTIME
Refills: 0 | Status: DISCONTINUED | OUTPATIENT
Start: 2020-03-15 | End: 2020-03-24

## 2020-03-15 RX ORDER — METOPROLOL TARTRATE 50 MG
25 TABLET ORAL EVERY 12 HOURS
Refills: 0 | Status: DISCONTINUED | OUTPATIENT
Start: 2020-03-15 | End: 2020-03-24

## 2020-03-15 RX ORDER — APIXABAN 2.5 MG/1
5 TABLET, FILM COATED ORAL EVERY 12 HOURS
Refills: 0 | Status: DISCONTINUED | OUTPATIENT
Start: 2020-03-15 | End: 2020-03-24

## 2020-03-15 RX ORDER — BRIMONIDINE TARTRATE 2 MG/MG
1 SOLUTION/ DROPS OPHTHALMIC
Refills: 0 | Status: DISCONTINUED | OUTPATIENT
Start: 2020-03-15 | End: 2020-03-24

## 2020-03-15 RX ORDER — TAMSULOSIN HYDROCHLORIDE 0.4 MG/1
0.4 CAPSULE ORAL AT BEDTIME
Refills: 0 | Status: DISCONTINUED | OUTPATIENT
Start: 2020-03-15 | End: 2020-03-24

## 2020-03-15 RX ADMIN — Medication 1 TABLET(S): at 19:32

## 2020-03-15 RX ADMIN — APIXABAN 5 MILLIGRAM(S): 2.5 TABLET, FILM COATED ORAL at 19:31

## 2020-03-15 RX ADMIN — HYDROXYUREA 500 MILLIGRAM(S): 500 CAPSULE ORAL at 22:18

## 2020-03-15 RX ADMIN — TAMSULOSIN HYDROCHLORIDE 0.4 MILLIGRAM(S): 0.4 CAPSULE ORAL at 22:19

## 2020-03-15 RX ADMIN — LATANOPROST 1 DROP(S): 0.05 SOLUTION/ DROPS OPHTHALMIC; TOPICAL at 22:20

## 2020-03-15 RX ADMIN — Medication 25 MILLIGRAM(S): at 19:31

## 2020-03-15 RX ADMIN — PIPERACILLIN AND TAZOBACTAM 200 GRAM(S): 4; .5 INJECTION, POWDER, LYOPHILIZED, FOR SOLUTION INTRAVENOUS at 12:50

## 2020-03-15 NOTE — H&P ADULT - ASSESSMENT
84M with hx multiple myeloma, prostate CA (s/p brachytherapy), pAfib (on eliquis), recurrent DVTs, dementia, BPH, PVD, presents with subjective fever? possibly up to 101.3 (per ED provider from [facility]) from [facility]. Admit for COVID-19 rule out given subjective fever history [facility] where multiple COVID-19 positive patients have been noted.

## 2020-03-15 NOTE — ED ADULT NURSE NOTE - OBJECTIVE STATEMENT
85 y/o male w/ hx of multiple myeloma, afib, DVT, dementia, BPH, presents to ED via EMS from [facility] c/o possible fever and COVID-19 exposure. Pt reports he feels fine. Denies fever, chills, SOB, CP, cough, and any other sx. Pt refused temperature.

## 2020-03-15 NOTE — ED PROVIDER NOTE - PROGRESS NOTE DETAILS
Nathalia: left voicemail for wife to update her. Klepfish: Mild anemia and mild SARAH, other labs grossly at pt's baseline. UA+, has chronic díaz. Given prior fever will tx for possible UTI. RVP/COVID testing pending. Will admit for further care. no call back from wife.

## 2020-03-15 NOTE — H&P ADULT - PROBLEM SELECTOR PLAN 5
Hx of prostate ca, s/p brachytherapy, not active issue  - continue to monitor clinically  - Obtain collateral from patient's PCP/oncologist

## 2020-03-15 NOTE — ED PROVIDER NOTE - CLINICAL SUMMARY MEDICAL DECISION MAKING FREE TEXT BOX
84M from [facility] rehab ([phone]) and nursing [facility] multiple myeloma, prostate ca (s/p SEED therapy), pAfib, recurrent DVTs, dementia, BPH, PVD p/w fever?  Attempted to call NH x3 w/ no response. Per pt - he feels fine. States they just sent him here, does not know why. Reportedly, there has been confirmed COVID-19+ patient on his floor. d/w dr. colin who referred pt to ED - pt has cough and fever 101.5, +confirmed case of COVID on his floor. Vitals wnl, exam as above. Refusing rectal temp. Well appearing.   ddx: Concern for possible COVID. Possible bacterial infection.  sepsis labs, RVP/UA/CXR. Pt well appearing w/ vitals wnl. Does not meet sepsis criteria. Will hold on abx for now. Will reassess.

## 2020-03-15 NOTE — H&P ADULT - PROBLEM SELECTOR PLAN 2
Hx of BPH, on chronic indwelling díaz  - U/A grossly positive, but patient asymptomatic, afebrile, no elevated WBC: will hold abx for now

## 2020-03-15 NOTE — H&P ADULT - NSHPPHYSICALEXAM_GEN_ALL_CORE
VITAL SIGNS:  T(C): --  T(F): --  HR: 93 (03-15-20 @ 11:16) (93 - 93)  BP: 126/60 (03-15-20 @ 11:16) (126/60 - 126/60)  BP(mean): --  RR: 18 (03-15-20 @ 11:16) (18 - 18)  SpO2: 100% (03-15-20 @ 11:16) (100% - 100%)  Wt(kg): --    PHYSICAL EXAM:    Constitutional: Elderly male, llying comfortably in bed, NAD  Head: Nc/At  Eyes: PERRL, EOMI, clear conjunctiva  ENT: no nasal discharge; MMM  Neck: supple; no JVD  Respiratory: CTA b/l, no wheezes, no crackles  Cardiac: +S1/S2, +RRR, no murmurs, rubs, or gallops  Gastrointestinal: soft, non-tender, non-distended, normoactive bowel sounds x4  Extremities: WWP, no clubbing or cyanosis, no peripheral edema  Vascular: 2+ radial and DP pulses b/l  Dermatologic: skin warm, dry and intact, no rashes, wounds  Neurologic: AAOx2 (person place, not to time), wheelchair/bedbound at baseline

## 2020-03-15 NOTE — ED PROVIDER NOTE - PHYSICAL EXAMINATION
1+ b/l LE edema w/ hyperpigmented skin, baseline b/l LE weakness.   well appearing. 1+ b/l LE edema w/ hyperpigmented skin, baseline b/l LE weakness.   well appearing.  díaz in place

## 2020-03-15 NOTE — H&P ADULT - PROBLEM SELECTOR PLAN 9
1) PCP Contacted on Admission: (Y/N) --> Name & Phone #: Dr. Rooney (Dr. Shruthi chance)  2) Date of Contact with PCP: 3/15/2020  3) PCP Contacted at Discharge: (Y/N, N/A)  4) Summary of Handoff Given to PCP:   5) Post-Discharge Appointment Date and Location:

## 2020-03-15 NOTE — H&P ADULT - HISTORY OF PRESENT ILLNESS
84M with hx multiple myeloma, prostate CA (s/p brachytherapy), pAfib (on eliquis), recurrent DVTs, dementia, BPH, PVD, presents with subjective fever? possibly up to 101.3 (per ED provider from [facility]) from [facility]. Patient feels completely "fine." He is a poor historian, and is not sure why [facility] sent him to [facility]. ED provider informed medicine team that there was supposedly a confirmed COVID-19 (positive) patient on the same floor as Mr. Chicas. Otherwise, patient is without any symptoms. Simply states he is a bit tired from everything that has happened so far. Denies fever, chills, nausea, vomiting, changes in bowel movements, changes in urinary habits, lightheadedness/dizziness, myalgia/arthralgia. Patient is bedbound/wheelchair bound at baseline.    ED vitals: No recorded temperature, HR 93, /60, RR18, O2 sat 100% on RA  ED labs: WBC 6.04, Hb 7.9 (baseline 9-11), 1.7% metamyelocytes, INR 1.83, BUN 30, Cr 1.16, Albumin 3.2, Gluc 157  ED studies: grossly positive UA (chronic díaz), RVP negative, Chest X-ray is unremarkable  ED course: zosyn 3.375 given in ED

## 2020-03-15 NOTE — H&P ADULT - PROBLEM SELECTOR PLAN 3
Hx of multiple myeloma; was receiving bortezomib treatment 2 weeks on, 1 week off. Also on dexamethasone as outpatient (no longer).  - f/u outpatient heme-onc for recs  - C/w home medication hydroxyurea 500 mg q12h  - Obtain collateral from patient's PCP/oncologist

## 2020-03-15 NOTE — H&P ADULT - PROBLEM SELECTOR PLAN 6
Patient has recurrent history of DVT. Heparin drip was started in 8/2019 admission and switched to lovenox 70 mg q12h, but now on eliquis (for DVT/Afib)  - c/w anticoag w/ apixaban as per below    #Afib  Was originally not on anticoag or rate control per outpatient or on rate control per PCP's office ([place], [phone]). Found to have RVR during hospital course in 8/19  - C/w eliquis  - C/w metoprolol tartrate 25 mg BID

## 2020-03-15 NOTE — H&P ADULT - PROBLEM SELECTOR PLAN 1
Patient is asymptomatic. Exposure risk: floormate was positive, and patient comes from [facility] (high risk exposure site, given possible transmission from multiple other people, physical therapists, physiatrists, PCP, etc.).  - F/u COVID-19 PCR  - Not lymphopenic, f/u serum ferritin, procal, CRP, and LDH  - Supportive care  - Holding antibiotics for now

## 2020-03-15 NOTE — ED PROVIDER NOTE - OBJECTIVE STATEMENT
84M from [facility] rehab ([phone]) and nursing [facility] multiple myeloma, prostate ca (s/p SEED therapy), pAfib, recurrent DVTs, dementia, BPH, PVD p/w fever  Attempted to call NH x3 w/ no response.   meds: tylenol, eliquis, brimonidine gtt, hydoxyurea, latanoprost, melatonin, metoprolol, omeprazole, flomax, zyprexa, vit B  DNR/DNI 84M from [facility] rehab ([phone]) and nursing [facility] multiple myeloma, prostate ca (s/p SEED therapy), pAfib, recurrent DVTs, dementia, BPH, PVD p/w fever?  Attempted to call NH x3 w/ no response. Per pt - he feels fine. States they just sent him here, does not know why. Reportedly, there has been confirmed COVID-19+ patient on his floor. Denies f/c, SOB/CP, cough, NVD, abd pain, urinary complaints, black/bloody stool, URI symptoms. Pt is wheelchair bound.   meds: tylenol, eliquis, brimonidine gtt, hydoxyurea, latanoprost, melatonin, metoprolol, omeprazole, flomax, zyprexa, vit B  DNR/DNI

## 2020-03-15 NOTE — H&P ADULT - NSHPLABSRESULTS_GEN_ALL_CORE
LABS:                         7.9    6.04  )-----------( 175      ( 15 Mar 2020 12:16 )             25.6     03-15    137  |  104  |  30<H>  ----------------------------<  157<H>  5.0   |  23  |  1.16    Ca    8.9      15 Mar 2020 12:16    TPro  7.1  /  Alb  3.2<L>  /  TBili  0.3  /  DBili  x   /  AST  18  /  ALT  10  /  AlkPhos  51  03-15    PT/INR - ( 15 Mar 2020 12:16 )   PT: 21.2 sec;   INR: 1.83          PTT - ( 15 Mar 2020 12:16 )  PTT:35.4 sec  Urinalysis Basic - ( 15 Mar 2020 12:22 )    Color: Yellow / Appearance: Clear / S.015 / pH: x  Gluc: x / Ketone: NEGATIVE  / Bili: Negative / Urobili: 0.2 E.U./dL   Blood: x / Protein: 30 mg/dL / Nitrite: POSITIVE   Leuk Esterase: Large / RBC: Many /HPF / WBC > 10 /HPF   Sq Epi: x / Non Sq Epi: 0-5 /HPF / Bacteria: Many /HPF      Lactate, Blood: 1.4 mmol/L (03-15 @ 12:16)

## 2020-03-16 LAB
CULTURE RESULTS: SIGNIFICANT CHANGE UP
SPECIMEN SOURCE: SIGNIFICANT CHANGE UP

## 2020-03-16 RX ADMIN — APIXABAN 5 MILLIGRAM(S): 2.5 TABLET, FILM COATED ORAL at 17:27

## 2020-03-16 RX ADMIN — Medication 650 MILLIGRAM(S): at 00:22

## 2020-03-16 RX ADMIN — BRIMONIDINE TARTRATE 1 DROP(S): 2 SOLUTION/ DROPS OPHTHALMIC at 06:21

## 2020-03-16 RX ADMIN — PANTOPRAZOLE SODIUM 40 MILLIGRAM(S): 20 TABLET, DELAYED RELEASE ORAL at 06:21

## 2020-03-16 RX ADMIN — BRIMONIDINE TARTRATE 1 DROP(S): 2 SOLUTION/ DROPS OPHTHALMIC at 17:28

## 2020-03-16 RX ADMIN — TAMSULOSIN HYDROCHLORIDE 0.4 MILLIGRAM(S): 0.4 CAPSULE ORAL at 21:25

## 2020-03-16 RX ADMIN — Medication 25 MILLIGRAM(S): at 17:27

## 2020-03-16 RX ADMIN — HYDROXYUREA 500 MILLIGRAM(S): 500 CAPSULE ORAL at 06:22

## 2020-03-16 RX ADMIN — LATANOPROST 1 DROP(S): 0.05 SOLUTION/ DROPS OPHTHALMIC; TOPICAL at 21:25

## 2020-03-16 RX ADMIN — APIXABAN 5 MILLIGRAM(S): 2.5 TABLET, FILM COATED ORAL at 06:21

## 2020-03-16 RX ADMIN — HYDROXYUREA 500 MILLIGRAM(S): 500 CAPSULE ORAL at 17:28

## 2020-03-16 RX ADMIN — Medication 1 TABLET(S): at 08:54

## 2020-03-16 RX ADMIN — Medication 25 MILLIGRAM(S): at 06:21

## 2020-03-16 RX ADMIN — Medication 650 MILLIGRAM(S): at 01:20

## 2020-03-16 NOTE — DIETITIAN INITIAL EVALUATION ADULT. - PROBLEM SELECTOR PLAN 6
Patient has recurrent history of DVT. Heparin drip was started in 8/2019 admission and switched to lovenox 70 mg q12h, but now on eliquis (for DVT/Afib)  - c/w anticoag w/ apixaban as per below    #Afib  Was originally not on anticoag or rate control per outpatient or on rate control per PCP's office ([place], [phone]). Found to have RVR during hospital course in 8/19  - C/w eliquis  - C/w metoprolol tartrate 25 mg BID

## 2020-03-16 NOTE — DIETITIAN INITIAL EVALUATION ADULT. - OTHER INFO
83y/o male with history of Multiple Myeloma, Prostate CA, Glaucoma, BPH, PVD ,AFib, Dementia admitted to r/o Corona Virus. No N/V/D/or pain reported. Possible constipation per RN  Skin reported intact([id number]).Fair intake reported. RD obtained information from RN and chart due to r/o COVID-19.Patient is presently 82% of IBW.Increased need due to fevers. RD will monitor closely. Request close monitoring of supplements.

## 2020-03-16 NOTE — PROGRESS NOTE ADULT - ASSESSMENT
84M with hx multiple myeloma, prostate CA (s/p brachytherapy), pAfib (on eliquis), recurrent DVTs, dementia, BPH, PVD, presents with subjective fever? possibly up to 101.3 (per ED provider from [facility]) from [facility]. Admit for COVID-19 rule out given subjective fever [facility] COVID-19 positive patients have been noted.

## 2020-03-16 NOTE — DIETITIAN INITIAL EVALUATION ADULT. - ENERGY NEEDS
Ht:6ft 2inches,IBW:190lbs +/-10%,82% of IBW.BMI:20.Adjusted for age demands and increased fluid needs for fevers

## 2020-03-16 NOTE — PROGRESS NOTE ADULT - PROBLEM SELECTOR PLAN 1
Patient is asymptomatic. Exposure risk: floormate was positive, and patient comes from [facility] (high risk exposure site, given possible transmission from multiple other people, physical therapists, physiatrists, PCP, etc.).  - F/u COVID-19 PCR  - Not lymphopenic, serum ferritin ferritin 325, procal 0.36, CRP 3.91, and   - Supportive care  - Holding antibiotics for now

## 2020-03-16 NOTE — PROGRESS NOTE ADULT - SUBJECTIVE AND OBJECTIVE BOX
*** NOTE IN PROGRESS ***    INTERVAL HPI/OVERNIGHT EVENTS: No acute events overnight.    SUBJECTIVE: Patient seen and examined at bedside.     OBJECTIVE:    VITAL SIGNS:  ICU Vital Signs Last 24 Hrs  T(C): 36.9 (16 Mar 2020 09:03), Max: 37.5 (15 Mar 2020 18:18)  T(F): 98.4 (16 Mar 2020 09:03), Max: 99.5 (15 Mar 2020 18:18)  HR: 59 (16 Mar 2020 09:03) (59 - 93)  BP: 108/58 (16 Mar 2020 09:03) (108/58 - 148/66)  BP(mean): --  ABP: --  ABP(mean): --  RR: 16 (16 Mar 2020 09:03) (16 - 18)  SpO2: 98% (16 Mar 2020 09:03) (96% - 100%)        03-15 @ 07:01  -  03-16 @ 07:00  --------------------------------------------------------  IN: 600 mL / OUT: 600 mL / NET: 0 mL      CAPILLARY BLOOD GLUCOSE          PHYSICAL EXAM:  Constitutional: Elderly male, lying comfortably in bed, NAD  Head: Nc/At  Eyes: PERRL, EOMI, clear conjunctiva  ENT: no nasal discharge; MMM  Neck: supple; no JVD  Respiratory: CTA b/l, no wheezes, no crackles  Cardiac: +S1/S2, +RRR, no murmurs, rubs, or gallops  Gastrointestinal: soft, non-tender, non-distended, normoactive bowel sounds x4  Extremities: WWP, no clubbing or cyanosis, no peripheral edema  Vascular: 2+ radial and DP pulses b/l  Dermatologic: skin warm, dry and intact, no rashes, wounds  Neurologic: AAOx2 (person place, not to time), wheelchair/bedbound at baseline        MEDICATIONS:  MEDICATIONS  (STANDING):  apixaban 5 milliGRAM(s) Oral every 12 hours  brimonidine 0.2% Ophthalmic Solution 1 Drop(s) Both EYES two times a day  hydroxyurea 500 milliGRAM(s) Oral every 12 hours  influenza   Vaccine 0.5 milliLiter(s) IntraMuscular once  latanoprost 0.005% Ophthalmic Solution 1 Drop(s) Both EYES at bedtime  metoprolol tartrate 25 milliGRAM(s) Oral every 12 hours  multivitamin 1 Tablet(s) Oral daily  pantoprazole    Tablet 40 milliGRAM(s) Oral before breakfast  tamsulosin 0.4 milliGRAM(s) Oral at bedtime    MEDICATIONS  (PRN):  acetaminophen   Tablet .. 650 milliGRAM(s) Oral every 6 hours PRN Mild Pain (1 - 3)  melatonin 5 milliGRAM(s) Oral at bedtime PRN Insomnia      ALLERGIES:  Allergies    No Known Allergies    Intolerances        LABS:                        7.9    6.04  )-----------( 175      ( 15 Mar 2020 12:16 )             25.6     03-15    137  |  104  |  30<H>  ----------------------------<  157<H>  5.0   |  23  |  1.16    Ca    8.9      15 Mar 2020 12:16    TPro  7.1  /  Alb  3.2<L>  /  TBili  0.3  /  DBili  x   /  AST  18  /  ALT  10  /  AlkPhos  51  03-15    PT/INR - ( 15 Mar 2020 12:16 )   PT: 21.2 sec;   INR: 1.83          PTT - ( 15 Mar 2020 12:16 )  PTT:35.4 sec  Urinalysis Basic - ( 15 Mar 2020 12:22 )    Color: Yellow / Appearance: Clear / S.015 / pH: x  Gluc: x / Ketone: NEGATIVE  / Bili: Negative / Urobili: 0.2 E.U./dL   Blood: x / Protein: 30 mg/dL / Nitrite: POSITIVE   Leuk Esterase: Large / RBC: Many /HPF / WBC > 10 /HPF   Sq Epi: x / Non Sq Epi: 0-5 /HPF / Bacteria: Many /HPF        RADIOLOGY & ADDITIONAL TESTS: Reviewed. INTERVAL HPI/OVERNIGHT EVENTS: No acute events overnight.    SUBJECTIVE: Patient seen and examined at bedside. Poor appetite, but trying to eat more today. Still has slight cough. SOB about the same as before. No acute complaints, just wants to know the plan. Denies fever, nausea, vomiting, chest pain, abdominal pain, changes in bowel movements or urination.     OBJECTIVE:    VITAL SIGNS:  ICU Vital Signs Last 24 Hrs  T(C): 36.9 (16 Mar 2020 09:03), Max: 37.5 (15 Mar 2020 18:18)  T(F): 98.4 (16 Mar 2020 09:03), Max: 99.5 (15 Mar 2020 18:18)  HR: 59 (16 Mar 2020 09:03) (59 - 93)  BP: 108/58 (16 Mar 2020 09:03) (108/58 - 148/66)  BP(mean): --  ABP: --  ABP(mean): --  RR: 16 (16 Mar 2020 09:03) (16 - 18)  SpO2: 98% (16 Mar 2020 09:03) (96% - 100%)        03-15 @ 07:01  -  03-16 @ 07:00  --------------------------------------------------------  IN: 600 mL / OUT: 600 mL / NET: 0 mL      CAPILLARY BLOOD GLUCOSE          PHYSICAL EXAM:  Constitutional: Elderly male, lying comfortably in bed, NAD  Head: Nc/At  Eyes: PERRL, EOMI, clear conjunctiva  ENT: no nasal discharge; MMM  Neck: supple; no JVD  Respiratory: CTA b/l, no wheezes, no crackles  Cardiac: +S1/S2, +RRR, no murmurs, rubs, or gallops  Gastrointestinal: soft, non-tender, non-distended, normoactive bowel sounds x4  Extremities: WWP, no clubbing or cyanosis, no peripheral edema  Vascular: 2+ radial and DP pulses b/l  Dermatologic: skin warm, dry and intact, no rashes, wounds  Neurologic: AAOx2 (person place, not to time), wheelchair/bedbound at baseline        MEDICATIONS:  MEDICATIONS  (STANDING):  apixaban 5 milliGRAM(s) Oral every 12 hours  brimonidine 0.2% Ophthalmic Solution 1 Drop(s) Both EYES two times a day  hydroxyurea 500 milliGRAM(s) Oral every 12 hours  influenza   Vaccine 0.5 milliLiter(s) IntraMuscular once  latanoprost 0.005% Ophthalmic Solution 1 Drop(s) Both EYES at bedtime  metoprolol tartrate 25 milliGRAM(s) Oral every 12 hours  multivitamin 1 Tablet(s) Oral daily  pantoprazole    Tablet 40 milliGRAM(s) Oral before breakfast  tamsulosin 0.4 milliGRAM(s) Oral at bedtime    MEDICATIONS  (PRN):  acetaminophen   Tablet .. 650 milliGRAM(s) Oral every 6 hours PRN Mild Pain (1 - 3)  melatonin 5 milliGRAM(s) Oral at bedtime PRN Insomnia      ALLERGIES:  Allergies    No Known Allergies    Intolerances        LABS:                        7.9    6.04  )-----------( 175      ( 15 Mar 2020 12:16 )             25.6     03-15    137  |  104  |  30<H>  ----------------------------<  157<H>  5.0   |  23  |  1.16    Ca    8.9      15 Mar 2020 12:16    TPro  7.1  /  Alb  3.2<L>  /  TBili  0.3  /  DBili  x   /  AST  18  /  ALT  10  /  AlkPhos  51  03-15    PT/INR - ( 15 Mar 2020 12:16 )   PT: 21.2 sec;   INR: 1.83          PTT - ( 15 Mar 2020 12:16 )  PTT:35.4 sec  Urinalysis Basic - ( 15 Mar 2020 12:22 )    Color: Yellow / Appearance: Clear / S.015 / pH: x  Gluc: x / Ketone: NEGATIVE  / Bili: Negative / Urobili: 0.2 E.U./dL   Blood: x / Protein: 30 mg/dL / Nitrite: POSITIVE   Leuk Esterase: Large / RBC: Many /HPF / WBC > 10 /HPF   Sq Epi: x / Non Sq Epi: 0-5 /HPF / Bacteria: Many /HPF        RADIOLOGY & ADDITIONAL TESTS: Reviewed. INTERVAL HPI/OVERNIGHT EVENTS: No acute events overnight.    SUBJECTIVE: Patient seen and examined at bedside. Poor appetite, but trying to eat more today. Still has slight cough. SOB about the same as before. No acute complaints, just wants to know the plan. Denies fever, nausea, vomiting, chest pain, abdominal pain, changes in bowel movements or urination.     OBJECTIVE:    VITAL SIGNS:  ICU Vital Signs Last 24 Hrs   T(C): 36.9 (16 Mar 2020 09:03), Max: 37.5 (15 Mar 2020 18:18)  T(F): 98.4 (16 Mar 2020 09:03), Max: 99.5 (15 Mar 2020 18:18)  HR: 59 (16 Mar 2020 09:03) (59 - 93)  BP: 108/58 (16 Mar 2020 09:03) (108/58 - 148/66)  BP(mean): --  ABP: --  ABP(mean): --  RR: 16 (16 Mar 2020 09:03) (16 - 18)  SpO2: 98% (16 Mar 2020 09:03) (96% - 100%)        03-15 @ 07:01  -  03-16 @ 07:00  --------------------------------------------------------  IN: 600 mL / OUT: 600 mL / NET: 0 mL      CAPILLARY BLOOD GLUCOSE          PHYSICAL EXAM:  Constitutional: Elderly male, lying comfortably in bed, NAD  Head: Nc/At  Eyes: PERRL, EOMI, clear conjunctiva  ENT: no nasal discharge; MMM  Neck: supple; no JVD  Respiratory: CTA b/l, no wheezes, no crackles  Cardiac: +S1/S2, +RRR, no murmurs, rubs, or gallops  Gastrointestinal: soft, non-tender, non-distended, normoactive bowel sounds x4  Extremities: WWP, no clubbing or cyanosis, no peripheral edema  Vascular: 2+ radial and DP pulses b/l  Dermatologic: skin warm, dry and intact, no rashes, wounds  Neurologic: AAOx2 (person place, not to time), wheelchair/bedbound at baseline        MEDICATIONS:  MEDICATIONS  (STANDING):  apixaban 5 milliGRAM(s) Oral every 12 hours  brimonidine 0.2% Ophthalmic Solution 1 Drop(s) Both EYES two times a day  hydroxyurea 500 milliGRAM(s) Oral every 12 hours  influenza   Vaccine 0.5 milliLiter(s) IntraMuscular once  latanoprost 0.005% Ophthalmic Solution 1 Drop(s) Both EYES at bedtime  metoprolol tartrate 25 milliGRAM(s) Oral every 12 hours  multivitamin 1 Tablet(s) Oral daily  pantoprazole    Tablet 40 milliGRAM(s) Oral before breakfast  tamsulosin 0.4 milliGRAM(s) Oral at bedtime    MEDICATIONS  (PRN):  acetaminophen   Tablet .. 650 milliGRAM(s) Oral every 6 hours PRN Mild Pain (1 - 3)  melatonin 5 milliGRAM(s) Oral at bedtime PRN Insomnia      ALLERGIES:  Allergies    No Known Allergies    Intolerances        LABS:                        7.9    6.04  )-----------( 175      ( 15 Mar 2020 12:16 )             25.6     03-15    137  |  104  |  30<H>  ----------------------------<  157<H>  5.0   |  23  |  1.16    Ca    8.9      15 Mar 2020 12:16    TPro  7.1  /  Alb  3.2<L>  /  TBili  0.3  /  DBili  x   /  AST  18  /  ALT  10  /  AlkPhos  51  03-15    PT/INR - ( 15 Mar 2020 12:16 )   PT: 21.2 sec;   INR: 1.83          PTT - ( 15 Mar 2020 12:16 )  PTT:35.4 sec  Urinalysis Basic - ( 15 Mar 2020 12:22 )    Color: Yellow / Appearance: Clear / S.015 / pH: x  Gluc: x / Ketone: NEGATIVE  / Bili: Negative / Urobili: 0.2 E.U./dL   Blood: x / Protein: 30 mg/dL / Nitrite: POSITIVE   Leuk Esterase: Large / RBC: Many /HPF / WBC > 10 /HPF   Sq Epi: x / Non Sq Epi: 0-5 /HPF / Bacteria: Many /HPF        RADIOLOGY & ADDITIONAL TESTS: Reviewed.

## 2020-03-16 NOTE — DIETITIAN INITIAL EVALUATION ADULT. - PROBLEM SELECTOR PLAN 1
Patient is asymptomatic. Exposure risk: floormate was positive, and patient comes from [facility] (high risk exposure site, given possible transmission from multiple other people, physical therapists, physiatrists, PCP, etc.).  - F/u COVID-19 PCR  - Not lymphopenic, f/u serum ferritin, procal, CRP, and LDH  - Supportive care  - Holding antibiotics for now

## 2020-03-16 NOTE — PROGRESS NOTE ADULT - PROBLEM SELECTOR PLAN 6
Patient has recurrent history of DVT. Heparin drip was started in 8/2019 admission and switched to lovenox 70 mg q12h, but now on eliquis (for DVT/Afib)  - c/w anticoag w/ apixaban as per below    #Afib  Was originally not on anticoag or rate control per outpatient or on rate control per PCP's office ([place], [phone]). Found to have RVR during hospital course in 8/19  - C/w eliquis  - C/w metoprolol tartrate 25 mg BID

## 2020-03-16 NOTE — PROGRESS NOTE ADULT - SUBJECTIVE AND OBJECTIVE BOX
Interventional, Pulmonary, Critical, Chest Special Procedures.    Pt was seen and fully examined by myself.     Time spent with patient in minutes:  PPE COVID-19 protocol used throughout entire encounter  Patient is a 84y old  Male who presents with a chief complaint of COVID-19 rule out (16 Mar 2020 09:51)    HPI:  84M with hx multiple myeloma, prostate CA (s/p brachytherapy), pAfib (on eliquis), recurrent DVTs, dementia, BPH, PVD, presents with subjective fever? possibly up to 101.3 (per ED provider from [facility]) from [facility]. Patient feels completely "fine." He is a poor historian, and is not sure why [facility] sent [facility] to [facility]. ED provider informed medicine team that there was supposedly a confirmed COVID-19 (positive) patient on the same floor as Mr. Chicas. Otherwise, patient is without any symptoms. Simply states he is a bit tired from everything that has happened so far. Denies fever, chills, nausea, vomiting, changes in bowel movements, changes in urinary habits, lightheadedness/dizziness, myalgia/arthralgia. Patient is bedbound/wheelchair bound at baseline.    ED vitals: No recorded temperature, HR 93, /60, RR18, O2 sat 100% on RA  ED labs: WBC 6.04, Hb 7.9 (baseline 9-11), 1.7% metamyelocytes, INR 1.83, BUN 30, Cr 1.16, Albumin 3.2, Gluc 157  ED studies: grossly positive UA (chronic díaz), RVP negative, Chest X-ray is unremarkable  ED course: zosyn 3.375 given in ED (15 Mar 2020 14:51)      REVIEW OF SYSTEMS:  Constitutional: No fever, weight loss, chills or fatigue  Eyes: No eye pain, visual disturbances, or discharge  ENMT:  No difficulty hearing, tinnitus, vertigo; No sinus or throat pain. No epistaxis, dysphagia, dysphonia, hoarseness or odynophagia  Neck: No pain, stiffness or neck swelling.  No masses or deformities  Respiratory: No cough, wheezing, hemoptysis  - COPD  - ILD   - PE   - ASTHMA     - PNEUMONIA  Cardiovascular: No chest pain, dysrhythmia, palpitations, dizziness or edema - CAD   - CHF   - HTN  Gastrointestinal: No abdominal or epigastric pain. No nausea, vomiting or hematemesis; No diarrhea or constipation. No melena or hematochezia, Icterus.          Genitourinary: No dysuria, frequency, hematuria or incontinence   - CKD/SRAAH      - ESRD  Neurological: No headaches, memory loss, loss of strength, numbness or tremors      -DEMENTIA     - STROKE    - SEIZURE  Skin: No itching, burning, rashes or lesions   Lymph Nodes: No enlarged glands  Endocrine: No heat or cold intolerance; No hair loss       - DM     - THYROID DISORDER  Musculoskeletal: No joint pain or swelling; No muscle, back or extremity pain, No edema  Psychiatric: No depression, anxiety, mood swings or difficulty sleeping  Heme/Lymph: No easy bruising or bleeding gums         - ANEMIA      - CANCER   -COAGULOPATHY  Allergy and Immunologic: No hives or eczema    PAST MEDICAL & SURGICAL HISTORY:  BPH (benign prostatic hyperplasia)  Glaucoma  DVT (deep venous thrombosis)  Myeloma  Prostate cancer  H/O hernia repair    FAMILY HISTORY:    SOCIAL HISTORY:      - Tobacco     - ETOH    Allergies    No Known Allergies    Intolerances      Vital Signs Last 24 Hrs  T(C): 36.8 (16 Mar 2020 13:49), Max: 37.5 (15 Mar 2020 18:18)  T(F): 98.3 (16 Mar 2020 13:49), Max: 99.5 (15 Mar 2020 18:18)  HR: 67 (16 Mar 2020 13:49) (59 - 72)  BP: 108/69 (16 Mar 2020 13:49) (108/58 - 148/66)  BP(mean): --  RR: 16 (16 Mar 2020 13:49) (16 - 18)  SpO2: 94% (16 Mar 2020 13:49) (94% - 98%)    03-15 @ 07:01  -  03-16 @ 07:00  --------------------------------------------------------  IN: 600 mL / OUT: 600 mL / NET: 0 mL          MEDICATIONS:  MEDICATIONS  (STANDING):  apixaban 5 milliGRAM(s) Oral every 12 hours  brimonidine 0.2% Ophthalmic Solution 1 Drop(s) Both EYES two times a day  hydroxyurea 500 milliGRAM(s) Oral every 12 hours  influenza   Vaccine 0.5 milliLiter(s) IntraMuscular once  latanoprost 0.005% Ophthalmic Solution 1 Drop(s) Both EYES at bedtime  metoprolol tartrate 25 milliGRAM(s) Oral every 12 hours  multivitamin 1 Tablet(s) Oral daily  pantoprazole    Tablet 40 milliGRAM(s) Oral before breakfast  tamsulosin 0.4 milliGRAM(s) Oral at bedtime    MEDICATIONS  (PRN):  acetaminophen   Tablet .. 650 milliGRAM(s) Oral every 6 hours PRN Mild Pain (1 - 3)  melatonin 5 milliGRAM(s) Oral at bedtime PRN Insomnia      PHYSICAL EXAM:  Comfortable, no distress  Eyes: PERRL, EOM intact; conjunctiva and sclera clear  Head: Normocephalic;  No Trauma  ENMT: No nasal discharge, hoarseness, cough or hemoptysis  Neck: Supple; non tender; no masses or deformities.    No JVD  Respiratory: CTA,  - WHEEZING   - RHONCHI  - RALES  - CRACKLES.  Diminished breath sounds  BILATERAL  RIGHT  LEFT  Cardiovascular: Regular rate and rhythm. S1 and S2 Normal; No murmurs, gallops or rubs     - PPM/AICD  Gastrointestinal: Soft non-tender, non-distended; Normal bowel sounds; No hepatosplenomegaly.     -PEG    -  GT   - DÍAZ  Genitourinary: No costovertebral angle tenderness. No dysuria  Extremities: AROM, No clubbing, cyanosis or edema    Vascular: Peripheral pulses palpable 2+ bilaterally  Neurological: Alert and responisve to stimuli   Skin: Warm and dry. No obvious rash  Lymph Nodes: No acute cervical or supraclavicular adenopathy  Psychiatric: Cooperative and appropriate mood    DEVICES:  - DENTURES   +IV R / L     - ETUBE   -TRACH   -CTUBE  R / L    LABS:                          7.9    6.04  )-----------( 175      ( 15 Mar 2020 12:16 )             25.6     03-15    137  |  104  |  30<H>  ----------------------------<  157<H>  5.0   |  23  |  1.16    Ca    8.9      15 Mar 2020 12:16    TPro  7.1  /  Alb  3.2<L>  /  TBili  0.3  /  DBili  x   /  AST  18  /  ALT  10  /  AlkPhos  51  03-15    PT/INR - ( 15 Mar 2020 12:16 )   PT: 21.2 sec;   INR: 1.83          PTT - ( 15 Mar 2020 12:16 )  PTT:35.4 sec  RADIOLOGY & ADDITIONAL STUDIES (The following images were personally reviewed):

## 2020-03-17 LAB
ANION GAP SERPL CALC-SCNC: 9 MMOL/L — SIGNIFICANT CHANGE UP (ref 5–17)
BUN SERPL-MCNC: 21 MG/DL — SIGNIFICANT CHANGE UP (ref 7–23)
CALCIUM SERPL-MCNC: 8.5 MG/DL — SIGNIFICANT CHANGE UP (ref 8.4–10.5)
CHLORIDE SERPL-SCNC: 104 MMOL/L — SIGNIFICANT CHANGE UP (ref 96–108)
CO2 SERPL-SCNC: 24 MMOL/L — SIGNIFICANT CHANGE UP (ref 22–31)
CREAT SERPL-MCNC: 0.85 MG/DL — SIGNIFICANT CHANGE UP (ref 0.5–1.3)
CULTURE RESULTS: SIGNIFICANT CHANGE UP
CULTURE RESULTS: SIGNIFICANT CHANGE UP
GLUCOSE SERPL-MCNC: 110 MG/DL — HIGH (ref 70–99)
HCT VFR BLD CALC: 27.2 % — LOW (ref 39–50)
HGB BLD-MCNC: 8.2 G/DL — LOW (ref 13–17)
MAGNESIUM SERPL-MCNC: 1.8 MG/DL — SIGNIFICANT CHANGE UP (ref 1.6–2.6)
MCHC RBC-ENTMCNC: 28 PG — SIGNIFICANT CHANGE UP (ref 27–34)
MCHC RBC-ENTMCNC: 30.1 GM/DL — LOW (ref 32–36)
MCV RBC AUTO: 92.8 FL — SIGNIFICANT CHANGE UP (ref 80–100)
NRBC # BLD: 3 /100 WBCS — HIGH (ref 0–0)
PHOSPHATE SERPL-MCNC: 3.6 MG/DL — SIGNIFICANT CHANGE UP (ref 2.5–4.5)
PLATELET # BLD AUTO: 167 K/UL — SIGNIFICANT CHANGE UP (ref 150–400)
POTASSIUM SERPL-MCNC: 4.5 MMOL/L — SIGNIFICANT CHANGE UP (ref 3.5–5.3)
POTASSIUM SERPL-SCNC: 4.5 MMOL/L — SIGNIFICANT CHANGE UP (ref 3.5–5.3)
RBC # BLD: 2.93 M/UL — LOW (ref 4.2–5.8)
RBC # FLD: 19.7 % — HIGH (ref 10.3–14.5)
SARS-COV-2 RNA SPEC QL NAA+PROBE: (no result)
SODIUM SERPL-SCNC: 137 MMOL/L — SIGNIFICANT CHANGE UP (ref 135–145)
WBC # BLD: 3.57 K/UL — LOW (ref 3.8–10.5)
WBC # FLD AUTO: 3.57 K/UL — LOW (ref 3.8–10.5)

## 2020-03-17 RX ORDER — MAGNESIUM SULFATE 500 MG/ML
1 VIAL (ML) INJECTION ONCE
Refills: 0 | Status: COMPLETED | OUTPATIENT
Start: 2020-03-17 | End: 2020-03-17

## 2020-03-17 RX ADMIN — Medication 1 TABLET(S): at 11:44

## 2020-03-17 RX ADMIN — PANTOPRAZOLE SODIUM 40 MILLIGRAM(S): 20 TABLET, DELAYED RELEASE ORAL at 05:06

## 2020-03-17 RX ADMIN — APIXABAN 5 MILLIGRAM(S): 2.5 TABLET, FILM COATED ORAL at 05:06

## 2020-03-17 RX ADMIN — HYDROXYUREA 500 MILLIGRAM(S): 500 CAPSULE ORAL at 05:06

## 2020-03-17 RX ADMIN — BRIMONIDINE TARTRATE 1 DROP(S): 2 SOLUTION/ DROPS OPHTHALMIC at 05:06

## 2020-03-17 RX ADMIN — Medication 25 MILLIGRAM(S): at 05:06

## 2020-03-17 RX ADMIN — BRIMONIDINE TARTRATE 1 DROP(S): 2 SOLUTION/ DROPS OPHTHALMIC at 18:33

## 2020-03-17 RX ADMIN — APIXABAN 5 MILLIGRAM(S): 2.5 TABLET, FILM COATED ORAL at 18:32

## 2020-03-17 RX ADMIN — LATANOPROST 1 DROP(S): 0.05 SOLUTION/ DROPS OPHTHALMIC; TOPICAL at 21:15

## 2020-03-17 RX ADMIN — HYDROXYUREA 500 MILLIGRAM(S): 500 CAPSULE ORAL at 18:33

## 2020-03-17 RX ADMIN — Medication 25 MILLIGRAM(S): at 18:32

## 2020-03-17 RX ADMIN — TAMSULOSIN HYDROCHLORIDE 0.4 MILLIGRAM(S): 0.4 CAPSULE ORAL at 21:15

## 2020-03-17 NOTE — PROGRESS NOTE ADULT - PROBLEM SELECTOR PLAN 6
Patient has recurrent history of DVT. Heparin drip was started in 8/2019 admission and switched to lovenox 70 mg q12h, but now on eliquis (for DVT/Afib)  - c/w anticoag w/ apixaban as per below    #Afib  Was originally not on anticoag or rate control per outpatient or on rate control per PCP's office ([place], [phone]). Found to have RVR during hospital course in 8/19  - C/w eliquis  - C/w metoprolol tartrate 25 mg BID

## 2020-03-17 NOTE — PROGRESS NOTE ADULT - SUBJECTIVE AND OBJECTIVE BOX
Interventional, Pulmonary, Critical, Chest Special Procedures.    Pt was seen and fully examined by myself.     Time spent with patient in minutes:97 including PPE preparation and disposal  Full PPE worn  Patient is a 84y old  Male who presents with a chief complaint of COVID-19 rule out (17 Mar 2020 12:27) The patient now eupneic on RA and afebrile, Awake and not engaging    HPI:  84M with hx multiple myeloma, prostate CA (s/p brachytherapy), pAfib (on eliquis), recurrent DVTs, dementia, BPH, PVD, presents with subjective fever? possibly up to 101.3 (per ED provider from [facility]) from [facility]. Patient feels completely "fine." He is a poor historian, and is not sure why [facility] sent him to [facility]. ED provider informed medicine team that there was supposedly a confirmed COVID-19 (positive) patient on the same floor as Mr. Chicas. Otherwise, patient is without any symptoms. Simply states he is a bit tired from everything that has happened so far. Denies fever, chills, nausea, vomiting, changes in bowel movements, changes in urinary habits, lightheadedness/dizziness, myalgia/arthralgia. Patient is bedbound/wheelchair bound at baseline.    ED vitals: No recorded temperature, HR 93, /60, RR18, O2 sat 100% on RA  ED labs: WBC 6.04, Hb 7.9 (baseline 9-11), 1.7% metamyelocytes, INR 1.83, BUN 30, Cr 1.16, Albumin 3.2, Gluc 157  ED studies: grossly positive UA (chronic díaz), RVP negative, Chest X-ray is unremarkable  ED course: zosyn 3.375 given in ED (15 Mar 2020 14:51)      REVIEW OF SYSTEMS: as above  Constitutional: No fever, weight loss, chills or fatigue  Eyes: No eye pain, visual disturbances, or discharge  ENMT:  No difficulty hearing, tinnitus, vertigo; No sinus or throat pain. No epistaxis, dysphagia, dysphonia, hoarseness or odynophagia  Neck: No pain, stiffness or neck swelling.  No masses or deformities  Respiratory: No cough, wheezing, hemoptysis  - COPD  - ILD   - PE   - ASTHMA     - PNEUMONIA  Cardiovascular: No chest pain, dysrhythmia, palpitations, dizziness or edema - CAD   - CHF   - HTN  Gastrointestinal: No abdominal or epigastric pain. No nausea, vomiting or hematemesis; No diarrhea or constipation. No melena or hematochezia, Icterus.          Genitourinary: No dysuria, frequency, hematuria or incontinence   - CKD/SARAH      - ESRD  Neurological: No headaches, memory loss, loss of strength, numbness or tremors      -DEMENTIA     - STROKE    - SEIZURE  Skin: No itching, burning, rashes or lesions   Lymph Nodes: No enlarged glands  Endocrine: No heat or cold intolerance; No hair loss       - DM     - THYROID DISORDER  Musculoskeletal: No joint pain or swelling; No muscle, back or extremity pain, No edema  Psychiatric: No depression, anxiety, mood swings or difficulty sleeping  Heme/Lymph: No easy bruising or bleeding gums         - ANEMIA      - CANCER   -COAGULOPATHY  Allergy and Immunologic: No hives or eczema    PAST MEDICAL & SURGICAL HISTORY:  BPH (benign prostatic hyperplasia)  Glaucoma  DVT (deep venous thrombosis)  Myeloma  Prostate cancer  H/O hernia repair    FAMILY HISTORY:    SOCIAL HISTORY:      - Tobacco     - ETOH    Allergies    No Known Allergies    Intolerances      Vital Signs Last 24 Hrs  T(C): 37.1 (17 Mar 2020 12:02), Max: 37.3 (17 Mar 2020 05:58)  T(F): 98.8 (17 Mar 2020 12:02), Max: 99.1 (17 Mar 2020 05:58)  HR: 71 (17 Mar 2020 12:02) (71 - 82)  BP: 143/91 (17 Mar 2020 12:02) (143/91 - 156/81)  BP(mean): --  RR: 17 (17 Mar 2020 12:02) (17 - 18)  SpO2: 98% (17 Mar 2020 12:02) (98% - 98%)    03-16 @ 07:01  -  03-17 @ 07:00  --------------------------------------------------------  IN: 0 mL / OUT: 700 mL / NET: -700 mL          MEDICATIONS:  MEDICATIONS  (STANDING):  apixaban 5 milliGRAM(s) Oral every 12 hours  brimonidine 0.2% Ophthalmic Solution 1 Drop(s) Both EYES two times a day  hydroxyurea 500 milliGRAM(s) Oral every 12 hours  influenza   Vaccine 0.5 milliLiter(s) IntraMuscular once  latanoprost 0.005% Ophthalmic Solution 1 Drop(s) Both EYES at bedtime  metoprolol tartrate 25 milliGRAM(s) Oral every 12 hours  multivitamin 1 Tablet(s) Oral daily  pantoprazole    Tablet 40 milliGRAM(s) Oral before breakfast  tamsulosin 0.4 milliGRAM(s) Oral at bedtime    MEDICATIONS  (PRN):  acetaminophen   Tablet .. 650 milliGRAM(s) Oral every 6 hours PRN Mild Pain (1 - 3)  melatonin 5 milliGRAM(s) Oral at bedtime PRN Insomnia      PHYSICAL EXAM:  Un Comfortable, no immediate distress  Eyes: PERRL, EOM intact; conjunctiva and sclera clear  Head: Normocephalic;  No Trauma  ENMT: No nasal discharge, hoarseness, cough or hemoptysis  Neck: Supple; non tender; no masses or deformities.    No JVD  Respiratory: CTA,  - WHEEZING   - RHONCHI  - RALES  - CRACKLES.  Diminished breath sounds  BILATERAL  RIGHT  LEFT  Cardiovascular: Regular rate and rhythm. S1 and S2 Normal; No murmurs, gallops or rubs     - PPM/AICD  Gastrointestinal: Soft non-tender, non-distended; Normal bowel sounds; No hepatosplenomegaly.     -PEG    -  GT   - DÍAZ  Genitourinary: No costovertebral angle tenderness. No dysuria  Extremities: AROM, No clubbing, cyanosis or edema    Vascular: Peripheral pulses palpable 2+ bilaterally  Neurological: Alert and responisve to stimuli   Skin: Warm and dry. No obvious rash  Lymph Nodes: No acute cervical or supraclavicular adenopathy  Psychiatric: Cooperative and appropriate mood    DEVICES:  - DENTURES   +IV R / L     - ETUBE   -TRACH   -CTUBE  R / L    LABS:                          8.2    3.57  )-----------( 167      ( 17 Mar 2020 07:33 )             27.2     03-17    137  |  104  |  21  ----------------------------<  110<H>  4.5   |  24  |  0.85    Ca    8.5      17 Mar 2020 07:33  Phos  3.6     03-17  Mg     1.8     03-17        RADIOLOGY & ADDITIONAL STUDIES (The following images were personally reviewed):CXR series

## 2020-03-17 NOTE — PROGRESS NOTE ADULT - ASSESSMENT
84M with hx multiple myeloma, prostate CA (s/p brachytherapy), pAfib (on eliquis), recurrent DVTs, dementia, BPH, PVD, presents with subjective fever? possibly up to 101.3 (per ED provider from [facility]) from [facility]. Admit for COVID-19 rule out given subjective fever [facility] where multiple COVID-19 positive patients have been noted.

## 2020-03-17 NOTE — PROGRESS NOTE ADULT - ASSESSMENT
ASSESSMENT/[id number] with hx multiple myeloma, prostate CA (s/p brachytherapy), pAfib (on eliquis), recurrent DVTs, dementia, BPH, PVD, presents with subjective fever? possibly up to 101.3 (per ED provider from [facility]out) from [facility]. Admit for COVID-19 rule out given subjective fever history in a [facility] where multiple COVID-19 positive patients have been noted.    1. O2 attempt off  2. Bronchodilators:  Atrovent/ albuterol q 4 – 6 hours as needed  3. Corticosteroids: off  4. ID/Antibiotics: off pending CX/  5. Cardiac/HTN: optimize BP  6. GI: Rx/ prophylaxis c PPI/H2B  7. Heme: Rx/VT prophylaxis c SQH/SCD/AC pt on Eliquis  8. Aspiration precautions at all times  Discussed with managing team

## 2020-03-17 NOTE — PROGRESS NOTE ADULT - SUBJECTIVE AND OBJECTIVE BOX
INTERVAL HPI/OVERNIGHT EVENTS: No acute events overnight.    SUBJECTIVE: Patient seen and examined at bedside. Feels okay today. Eating is so-so. No acute complaints other than having a chronic díaz catheter. Denies pain anywhere, cough, or trouble breathing.    OBJECTIVE:    VITAL SIGNS:  ICU Vital Signs Last 24 Hrs  T(C): 37.1 (17 Mar 2020 12:02), Max: 37.3 (17 Mar 2020 05:58)  T(F): 98.8 (17 Mar 2020 12:02), Max: 99.1 (17 Mar 2020 05:58)  HR: 71 (17 Mar 2020 12:02) (67 - 82)  BP: 143/91 (17 Mar 2020 12:02) (108/69 - 156/81)  BP(mean): --  ABP: --  ABP(mean): --  RR: 17 (17 Mar 2020 12:02) (16 - 18)  SpO2: 98% (17 Mar 2020 12:02) (94% - 98%)        03-16 @ 07:01  -  03-17 @ 07:00  --------------------------------------------------------  IN: 0 mL / OUT: 700 mL / NET: -700 mL      CAPILLARY BLOOD GLUCOSE          PHYSICAL EXAM:  Constitutional: Elderly male, lying comfortably in bed, NAD  Head: NC/AT  Eyes: PERRL, EOMI, clear conjunctiva  ENT: no nasal discharge; MMM  Neck: supple; no JVD  Respiratory: CTA b/l, no wheezes, no crackles  Cardiac: +S1/S2, +RRR, no murmurs, rubs, or gallops  Gastrointestinal: soft, non-tender, non-distended, normoactive bowel sounds x4  ; Díaz catheter  Extremities: WWP, no clubbing or cyanosis, no peripheral edema  Vascular: 2+ radial and DP pulses b/l  Dermatologic: skin warm, dry and intact, no rashes, wounds  Neurologic: AAOx2 (person place, not to time), wheelchair/bedbound at baseline        MEDICATIONS:  MEDICATIONS  (STANDING):  apixaban 5 milliGRAM(s) Oral every 12 hours  brimonidine 0.2% Ophthalmic Solution 1 Drop(s) Both EYES two times a day  hydroxyurea 500 milliGRAM(s) Oral every 12 hours  influenza   Vaccine 0.5 milliLiter(s) IntraMuscular once  latanoprost 0.005% Ophthalmic Solution 1 Drop(s) Both EYES at bedtime  metoprolol tartrate 25 milliGRAM(s) Oral every 12 hours  multivitamin 1 Tablet(s) Oral daily  pantoprazole    Tablet 40 milliGRAM(s) Oral before breakfast  tamsulosin 0.4 milliGRAM(s) Oral at bedtime    MEDICATIONS  (PRN):  acetaminophen   Tablet .. 650 milliGRAM(s) Oral every 6 hours PRN Mild Pain (1 - 3)  melatonin 5 milliGRAM(s) Oral at bedtime PRN Insomnia      ALLERGIES:  Allergies    No Known Allergies    Intolerances        LABS:                        8.2    3.57  )-----------( 167      ( 17 Mar 2020 07:33 )             27.2     03-17    137  |  104  |  21  ----------------------------<  110<H>  4.5   |  24  |  0.85    Ca    8.5      17 Mar 2020 07:33  Phos  3.6     03-17  Mg     1.8     03-17            RADIOLOGY & ADDITIONAL TESTS: Reviewed.

## 2020-03-17 NOTE — PROGRESS NOTE ADULT - PROBLEM SELECTOR PLAN 1
Patient is asymptomatic. Exposure risk: floormate was positive, and patient comes from [facility] (high risk exposure site, given possible transmission from multiple other people, physical therapists, physiatrists, PCP, etc.).  - F/u COVID-19 PCR  - Not lymphopenic, serum ferritin ferritin 325, procal 0.36, CRP 3.91, and   - Supportive care  - Holding antibiotics for now

## 2020-03-18 LAB
-  AMPICILLIN/SULBACTAM: SIGNIFICANT CHANGE UP
-  AMPICILLIN: SIGNIFICANT CHANGE UP
-  AMPICILLIN: SIGNIFICANT CHANGE UP
-  CEFAZOLIN: SIGNIFICANT CHANGE UP
-  CEFAZOLIN: SIGNIFICANT CHANGE UP
-  CEFEPIME: SIGNIFICANT CHANGE UP
-  CEFTAZIDIME: SIGNIFICANT CHANGE UP
-  CEFTRIAXONE: SIGNIFICANT CHANGE UP
-  CIPROFLOXACIN: SIGNIFICANT CHANGE UP
-  CIPROFLOXACIN: SIGNIFICANT CHANGE UP
-  DAPTOMYCIN: SIGNIFICANT CHANGE UP
-  ERYTHROMYCIN: SIGNIFICANT CHANGE UP
-  GENTAMICIN: SIGNIFICANT CHANGE UP
-  GENTAMICIN: SIGNIFICANT CHANGE UP
-  LEVOFLOXACIN: SIGNIFICANT CHANGE UP
-  LEVOFLOXACIN: SIGNIFICANT CHANGE UP
-  LINEZOLID: SIGNIFICANT CHANGE UP
-  NITROFURANTOIN: SIGNIFICANT CHANGE UP
-  NITROFURANTOIN: SIGNIFICANT CHANGE UP
-  OXACILLIN: SIGNIFICANT CHANGE UP
-  PENICILLIN: SIGNIFICANT CHANGE UP
-  PIPERACILLIN/TAZOBACTAM: SIGNIFICANT CHANGE UP
-  RIFAMPIN: SIGNIFICANT CHANGE UP
-  TETRACYCLINE: SIGNIFICANT CHANGE UP
-  TETRACYCLINE: SIGNIFICANT CHANGE UP
-  TOBRAMYCIN: SIGNIFICANT CHANGE UP
-  TRIMETHOPRIM/SULFAMETHOXAZOLE: SIGNIFICANT CHANGE UP
-  VANCOMYCIN: SIGNIFICANT CHANGE UP
ALBUMIN SERPL ELPH-MCNC: 3.2 G/DL — LOW (ref 3.3–5)
ALP SERPL-CCNC: 53 U/L — SIGNIFICANT CHANGE UP (ref 40–120)
ALT FLD-CCNC: 16 U/L — SIGNIFICANT CHANGE UP (ref 10–45)
ANION GAP SERPL CALC-SCNC: 11 MMOL/L — SIGNIFICANT CHANGE UP (ref 5–17)
AST SERPL-CCNC: 30 U/L — SIGNIFICANT CHANGE UP (ref 10–40)
BASOPHILS # BLD AUTO: 0.01 K/UL — SIGNIFICANT CHANGE UP (ref 0–0.2)
BASOPHILS NFR BLD AUTO: 0.3 % — SIGNIFICANT CHANGE UP (ref 0–2)
BILIRUB SERPL-MCNC: 0.3 MG/DL — SIGNIFICANT CHANGE UP (ref 0.2–1.2)
BUN SERPL-MCNC: 17 MG/DL — SIGNIFICANT CHANGE UP (ref 7–23)
CALCIUM SERPL-MCNC: 8.6 MG/DL — SIGNIFICANT CHANGE UP (ref 8.4–10.5)
CHLORIDE SERPL-SCNC: 101 MMOL/L — SIGNIFICANT CHANGE UP (ref 96–108)
CO2 SERPL-SCNC: 23 MMOL/L — SIGNIFICANT CHANGE UP (ref 22–31)
CREAT SERPL-MCNC: 0.87 MG/DL — SIGNIFICANT CHANGE UP (ref 0.5–1.3)
CULTURE RESULTS: SIGNIFICANT CHANGE UP
CULTURE RESULTS: SIGNIFICANT CHANGE UP
EOSINOPHIL # BLD AUTO: 0.01 K/UL — SIGNIFICANT CHANGE UP (ref 0–0.5)
EOSINOPHIL NFR BLD AUTO: 0.3 % — SIGNIFICANT CHANGE UP (ref 0–6)
GLUCOSE SERPL-MCNC: 113 MG/DL — HIGH (ref 70–99)
HCT VFR BLD CALC: 29.2 % — LOW (ref 39–50)
HGB BLD-MCNC: 9 G/DL — LOW (ref 13–17)
IMM GRANULOCYTES NFR BLD AUTO: 1.6 % — HIGH (ref 0–1.5)
LYMPHOCYTES # BLD AUTO: 1.14 K/UL — SIGNIFICANT CHANGE UP (ref 1–3.3)
LYMPHOCYTES # BLD AUTO: 36 % — SIGNIFICANT CHANGE UP (ref 13–44)
MAGNESIUM SERPL-MCNC: 1.7 MG/DL — SIGNIFICANT CHANGE UP (ref 1.6–2.6)
MCHC RBC-ENTMCNC: 28.3 PG — SIGNIFICANT CHANGE UP (ref 27–34)
MCHC RBC-ENTMCNC: 30.8 GM/DL — LOW (ref 32–36)
MCV RBC AUTO: 91.8 FL — SIGNIFICANT CHANGE UP (ref 80–100)
METHOD TYPE: SIGNIFICANT CHANGE UP
MONOCYTES # BLD AUTO: 0.63 K/UL — SIGNIFICANT CHANGE UP (ref 0–0.9)
MONOCYTES NFR BLD AUTO: 19.9 % — HIGH (ref 2–14)
NEUTROPHILS # BLD AUTO: 1.33 K/UL — LOW (ref 1.8–7.4)
NEUTROPHILS NFR BLD AUTO: 41.9 % — LOW (ref 43–77)
NRBC # BLD: 5 /100 WBCS — HIGH (ref 0–0)
ORGANISM # SPEC MICROSCOPIC CNT: SIGNIFICANT CHANGE UP
PHOSPHATE SERPL-MCNC: 3.6 MG/DL — SIGNIFICANT CHANGE UP (ref 2.5–4.5)
PLATELET # BLD AUTO: 188 K/UL — SIGNIFICANT CHANGE UP (ref 150–400)
POTASSIUM SERPL-MCNC: 4.4 MMOL/L — SIGNIFICANT CHANGE UP (ref 3.5–5.3)
POTASSIUM SERPL-SCNC: 4.4 MMOL/L — SIGNIFICANT CHANGE UP (ref 3.5–5.3)
PROT SERPL-MCNC: 7.4 G/DL — SIGNIFICANT CHANGE UP (ref 6–8.3)
RBC # BLD: 3.18 M/UL — LOW (ref 4.2–5.8)
RBC # FLD: 19.4 % — HIGH (ref 10.3–14.5)
SODIUM SERPL-SCNC: 135 MMOL/L — SIGNIFICANT CHANGE UP (ref 135–145)
WBC # BLD: 3.18 K/UL — LOW (ref 3.8–10.5)
WBC # FLD AUTO: 3.18 K/UL — LOW (ref 3.8–10.5)

## 2020-03-18 RX ORDER — MAGNESIUM SULFATE 500 MG/ML
1 VIAL (ML) INJECTION ONCE
Refills: 0 | Status: DISCONTINUED | OUTPATIENT
Start: 2020-03-18 | End: 2020-03-18

## 2020-03-18 RX ADMIN — BRIMONIDINE TARTRATE 1 DROP(S): 2 SOLUTION/ DROPS OPHTHALMIC at 18:11

## 2020-03-18 RX ADMIN — TAMSULOSIN HYDROCHLORIDE 0.4 MILLIGRAM(S): 0.4 CAPSULE ORAL at 22:13

## 2020-03-18 RX ADMIN — PANTOPRAZOLE SODIUM 40 MILLIGRAM(S): 20 TABLET, DELAYED RELEASE ORAL at 05:21

## 2020-03-18 RX ADMIN — Medication 25 MILLIGRAM(S): at 05:21

## 2020-03-18 RX ADMIN — HYDROXYUREA 500 MILLIGRAM(S): 500 CAPSULE ORAL at 05:24

## 2020-03-18 RX ADMIN — Medication 25 MILLIGRAM(S): at 18:10

## 2020-03-18 RX ADMIN — APIXABAN 5 MILLIGRAM(S): 2.5 TABLET, FILM COATED ORAL at 05:21

## 2020-03-18 RX ADMIN — BRIMONIDINE TARTRATE 1 DROP(S): 2 SOLUTION/ DROPS OPHTHALMIC at 05:24

## 2020-03-18 RX ADMIN — HYDROXYUREA 500 MILLIGRAM(S): 500 CAPSULE ORAL at 18:10

## 2020-03-18 RX ADMIN — Medication 100 GRAM(S): at 12:33

## 2020-03-18 RX ADMIN — Medication 1 TABLET(S): at 18:10

## 2020-03-18 RX ADMIN — APIXABAN 5 MILLIGRAM(S): 2.5 TABLET, FILM COATED ORAL at 18:10

## 2020-03-18 RX ADMIN — LATANOPROST 1 DROP(S): 0.05 SOLUTION/ DROPS OPHTHALMIC; TOPICAL at 22:12

## 2020-03-18 NOTE — PROGRESS NOTE ADULT - ASSESSMENT
84M with hx multiple myeloma, prostate CA (s/p brachytherapy), pAfib (on eliquis), recurrent DVTs, dementia, BPH, PVD, presents from [facility] with subjective fevers now confirmed COVID +.

## 2020-03-18 NOTE — PROGRESS NOTE ADULT - PROBLEM SELECTOR PLAN 6
Patient has recurrent history of DVT. Heparin drip was started in 8/2019 admission and switched to lovenox 70 mg q12h, but now on eliquis (for DVT/Afib)  - c/w anticoag w/ apixaban as per below    #Afib  Was originally not on anticoag or rate control per outpatient or on rate control per PCP's office ([place], [phone]). Found to have RVR during hospital course in 8/19  - C/w eliquis  - C/w metoprolol tartrate 25 mg BID

## 2020-03-18 NOTE — PROGRESS NOTE ADULT - SUBJECTIVE AND OBJECTIVE BOX
Interval / Overnight: PRUDENCIO    Subjective: Mr. Daniels tells me his left upper leg was feeling number and painful. He also endorses some mild pain while urinating. Otherwise he denies fevers, chills, sob, cp, cough. He denies n/v.     VITAL SIGNS:  Vital Signs Last 24 Hrs  T(C): 36.9 (18 Mar 2020 12:56), Max: 37.7 (17 Mar 2020 20:22)  T(F): 98.5 (18 Mar 2020 12:56), Max: 99.8 (17 Mar 2020 20:22)  HR: 63 (18 Mar 2020 12:56) (63 - 92)  BP: 131/74 (18 Mar 2020 12:56) (131/74 - 167/68)  BP(mean): --  RR: 18 (18 Mar 2020 12:56) (16 - 18)  SpO2: 95% (18 Mar 2020 12:56) (95% - 100%)    PHYSICAL EXAM:    General: in NAD, lying comfortably in bed, non-ill appearing   HEENT: normocephalic, atraumatic; PERRL, anicteric sclera; MMM  Neck: supple, no thyromegaly, no lymphadenopathy  Cardiovascular: +S1/S2, RRR, no M/G/R  Respiratory: Breathing comfortably, no accessory muscle use; clear to auscultation B/L; no wheezing, no rales, no rhonchi  Gastrointestinal: soft, NT/ND; +BSx4  Extremities: WWP  : brown urine from díaz L  Neurological: Alert to person and place, not date.     MEDICATIONS:  MEDICATIONS  (STANDING):  apixaban 5 milliGRAM(s) Oral every 12 hours  brimonidine 0.2% Ophthalmic Solution 1 Drop(s) Both EYES two times a day  hydroxyurea 500 milliGRAM(s) Oral every 12 hours  influenza   Vaccine 0.5 milliLiter(s) IntraMuscular once  latanoprost 0.005% Ophthalmic Solution 1 Drop(s) Both EYES at bedtime  metoprolol tartrate 25 milliGRAM(s) Oral every 12 hours  multivitamin 1 Tablet(s) Oral daily  pantoprazole    Tablet 40 milliGRAM(s) Oral before breakfast  tamsulosin 0.4 milliGRAM(s) Oral at bedtime    MEDICATIONS  (PRN):  acetaminophen   Tablet .. 650 milliGRAM(s) Oral every 6 hours PRN Mild Pain (1 - 3)  melatonin 5 milliGRAM(s) Oral at bedtime PRN Insomnia      ALLERGIES:  Allergies    No Known Allergies    Intolerances        LABS:                        9.0    3.18  )-----------( 188      ( 18 Mar 2020 06:21 )             29.2     03-18    135  |  101  |  17  ----------------------------<  113<H>  4.4   |  23  |  0.87    Ca    8.6      18 Mar 2020 06:21  Phos  3.6     03-18  Mg     1.7     03-18    TPro  7.4  /  Alb  3.2<L>  /  TBili  0.3  /  DBili  x   /  AST  30  /  ALT  16  /  AlkPhos  53  03-18        CAPILLARY BLOOD GLUCOSE          RADIOLOGY & ADDITIONAL TESTS: Reviewed.

## 2020-03-18 NOTE — PROGRESS NOTE ADULT - SUBJECTIVE AND OBJECTIVE BOX
Interventional, Pulmonary, Critical, Chest Special Procedures.    Pt was seen and fully examined by myself.     Time spent with patient in minutes:200  High risk  Full PPE assembled and worn throughout encounter    Patient is a 84y old  Male who presents with a chief complaint of COVID-19 rule out (17 Mar 2020 14:39)    HPI:  84M with hx multiple myeloma, prostate CA (s/p brachytherapy), pAfib (on eliquis), recurrent DVTs, dementia, BPH, PVD, presents with subjective fever? possibly up to 101.3 (per ED provider from signout) from [facility]. Patient feels completely "fine." He is a poor historian, and is not sure why [facility] sent him to [facility]. ED provider informed medicine team that there was supposedly a confirmed COVID-19 (positive) patient on the same floor as Mr. Chicas. Otherwise, patient is without any symptoms. Simply states he is a bit tired from everything that has happened so far. Denies fever, chills, nausea, vomiting, changes in bowel movements, changes in urinary habits, lightheadedness/dizziness, myalgia/arthralgia. Patient is bedbound/wheelchair bound at baseline.    ED vitals: No recorded temperature, HR 93, /60, RR18, O2 sat 100% on RA  ED labs: WBC 6.04, Hb 7.9 (baseline 9-11), 1.7% metamyelocytes, INR 1.83, BUN 30, Cr 1.16, Albumin 3.2, Gluc 157  ED studies: grossly positive UA (chronic díaz), RVP negative, Chest X-ray is unremarkable  ED course: zosyn 3.375 given in ED (15 Mar 2020 14:51)      REVIEW OF SYSTEMS:  as above  Constitutional: No fever, weight loss, chills or fatigue  Eyes: No eye pain, visual disturbances, or discharge  ENMT:  No difficulty hearing, tinnitus, vertigo; No sinus or throat pain. No epistaxis, dysphagia, dysphonia, hoarseness or odynophagia  Neck: No pain, stiffness or neck swelling.  No masses or deformities  Respiratory: No cough, wheezing, hemoptysis  - COPD  - ILD   - PE   - ASTHMA     - PNEUMONIA  Cardiovascular: No chest pain, dysrhythmia, palpitations, dizziness or edema - CAD   - CHF   - HTN  Gastrointestinal: No abdominal or epigastric pain. No nausea, vomiting or hematemesis; No diarrhea or constipation. No melena or hematochezia, Icterus.          Genitourinary: No dysuria, frequency, hematuria or incontinence   - CKD/SARAH      - ESRD  Neurological: No headaches, memory loss, loss of strength, numbness or tremors      -DEMENTIA     - STROKE    - SEIZURE  Skin: No itching, burning, rashes or lesions   Lymph Nodes: No enlarged glands  Endocrine: No heat or cold intolerance; No hair loss       - DM     - THYROID DISORDER  Musculoskeletal: No joint pain or swelling; No muscle, back or extremity pain, No edema  Psychiatric: No depression, anxiety, mood swings or difficulty sleeping  Heme/Lymph: No easy bruising or bleeding gums         - ANEMIA      - CANCER   -COAGULOPATHY    PAST MEDICAL & SURGICAL HISTORY:  BPH (benign prostatic hyperplasia)  Glaucoma  DVT (deep venous thrombosis)  Myeloma  Prostate cancer  H/O hernia repair    FAMILY HISTORY:    SOCIAL HISTORY:      - Tobacco     - ETOH    Allergies    No Known Allergies    Intolerances      Vital Signs Last 24 Hrs  T(C): 36.9 (18 Mar 2020 12:56), Max: 37.7 (17 Mar 2020 20:22)  T(F): 98.5 (18 Mar 2020 12:56), Max: 99.8 (17 Mar 2020 20:22)  HR: 63 (18 Mar 2020 12:56) (63 - 92)  BP: 131/74 (18 Mar 2020 12:56) (131/74 - 167/68)  BP(mean): --  RR: 18 (18 Mar 2020 12:56) (16 - 18)  SpO2: 95% (18 Mar 2020 12:56) (95% - 100%)    03-17 @ 07:01  -  03-18 @ 07:00  --------------------------------------------------------  IN: 0 mL / OUT: 2350 mL / NET: -2350 mL          MEDICATIONS:  MEDICATIONS  (STANDING):  apixaban 5 milliGRAM(s) Oral every 12 hours  brimonidine 0.2% Ophthalmic Solution 1 Drop(s) Both EYES two times a day  hydroxyurea 500 milliGRAM(s) Oral every 12 hours  influenza   Vaccine 0.5 milliLiter(s) IntraMuscular once  latanoprost 0.005% Ophthalmic Solution 1 Drop(s) Both EYES at bedtime  metoprolol tartrate 25 milliGRAM(s) Oral every 12 hours  multivitamin 1 Tablet(s) Oral daily  pantoprazole    Tablet 40 milliGRAM(s) Oral before breakfast  tamsulosin 0.4 milliGRAM(s) Oral at bedtime    MEDICATIONS  (PRN):  acetaminophen   Tablet .. 650 milliGRAM(s) Oral every 6 hours PRN Mild Pain (1 - 3)  melatonin 5 milliGRAM(s) Oral at bedtime PRN Insomnia      PHYSICAL EXAM:  Un Comfortable, no immediate distress  Eyes: PERRL, EOM intact; conjunctiva and sclera clear  Head: Normocephalic;  No Trauma  ENMT: No nasal discharge, hoarseness, cough or hemoptysis  Neck: Supple; non tender; no masses or deformities.    No JVD  Respiratory: CTA,  - WHEEZING   - RHONCHI  - RALES  - CRACKLES.  Diminished breath sounds  BILATERAL  RIGHT  LEFT  Cardiovascular: Regular rate and rhythm. S1 and S2 Normal; No murmurs, gallops or rubs     - PPM/AICD  Gastrointestinal: Soft non-tender, non-distended; Normal bowel sounds; No hepatosplenomegaly.     -PEG    -  GT   - DÍAZ  Genitourinary: No costovertebral angle tenderness. No dysuria  Extremities: AROM, No clubbing, cyanosis or edema    Vascular: Peripheral pulses palpable 2+ bilaterally  Neurological: Alert and responisve to stimuli   Skin: Warm and dry. No obvious rash  Lymph Nodes: No acute cervical or supraclavicular adenopathy  Psychiatric: Cooperative and appropriate mood    DEVICES:  - DENTURES   +IV R / L     - ETUBE   -TRACH   -CTUBE  R / L    LABS:                          9.0    3.18  )-----------( 188      ( 18 Mar 2020 06:21 )             29.2     03-18    135  |  101  |  17  ----------------------------<  113<H>  4.4   |  23  |  0.87    Ca    8.6      18 Mar 2020 06:21  Phos  3.6     03-18  Mg     1.7     03-18    TPro  7.4  /  Alb  3.2<L>  /  TBili  0.3  /  DBili  x   /  AST  30  /  ALT  16  /  AlkPhos  53  03-18      < from: Xray Chest 1 View-PORTABLE IMMEDIATE (03.15.20 @ 13:00) >  EXAM:  XR CHEST PORTABLE IMMED 1V                          PROCEDURE DATE:  03/15/2020          INTERPRETATION:  Clinical History: Sepsis    Frontal examination of the chest demonstrates mild cardiomegaly. No acute infiltrates. Mild dextroscoliosisthoracic spine.    Impression: No acute infiltrates    < end of copied text >  RADIOLOGY & ADDITIONAL STUDIES (The following images were personally reviewed):

## 2020-03-18 NOTE — PROGRESS NOTE ADULT - PROBLEM SELECTOR PLAN 1
Patient is asymptomatic. Exposure risk: floormate was positive, and [facility] (high risk exposure site, given possible transmission from multiple other people, physical therapists, physiatrists, PCP, etc.).  - COVID PCR +  - Not lymphopenic, serum ferritin ferritin 325, procal 0.36, CRP 3.91, and   - Supportive care

## 2020-03-18 NOTE — PROGRESS NOTE ADULT - PROBLEM SELECTOR PLAN 2
Hx of BPH, on chronic indwelling díaz  - U/A grossly positive, but patient asymptomatic, afebrile, no elevated WBC: will hold abx for now  - Ucx showing > 100K CFUs for morgonella, stenotrophomonas, MRSA, enterobacter

## 2020-03-18 NOTE — PROGRESS NOTE ADULT - ASSESSMENT
ASSESSMENT/[id number] with hx multiple myeloma, prostate CA (s/p brachytherapy), pAfib (on eliquis), recurrent DVTs, dementia, BPH, PVD, presents with subjective fever? possibly up to 101.3 (per ED provider from [facility]out) from [facility]. Admit for COVID-19.    1. O2 attempt off  2. Bronchodilators:  Atrovent/ albuterol q 4 – 6 hours as needed  3. Corticosteroids: off  4. ID/Antibiotics: off pending CX/  5. Cardiac/HTN: optimize BP  6. GI: Rx/ prophylaxis c PPI/H2B  7. Heme: Rx/VT prophylaxis c SQH/SCD/AC pt on Eliquis  8. Aspiration precautions at all times  Discussed with managing team

## 2020-03-19 LAB
-  AMPICILLIN/SULBACTAM: SIGNIFICANT CHANGE UP
-  AMPICILLIN: SIGNIFICANT CHANGE UP
-  CEFAZOLIN: SIGNIFICANT CHANGE UP
-  CEFEPIME: SIGNIFICANT CHANGE UP
-  CEFTRIAXONE: SIGNIFICANT CHANGE UP
-  GENTAMICIN: SIGNIFICANT CHANGE UP
-  MEROPENEM: SIGNIFICANT CHANGE UP
-  MEROPENEM: SIGNIFICANT CHANGE UP
-  NITROFURANTOIN: SIGNIFICANT CHANGE UP
-  PIPERACILLIN/TAZOBACTAM: SIGNIFICANT CHANGE UP
-  TOBRAMYCIN: SIGNIFICANT CHANGE UP
-  TRIMETHOPRIM/SULFAMETHOXAZOLE: SIGNIFICANT CHANGE UP
CULTURE RESULTS: SIGNIFICANT CHANGE UP
METHOD TYPE: SIGNIFICANT CHANGE UP
METHOD TYPE: SIGNIFICANT CHANGE UP
ORGANISM # SPEC MICROSCOPIC CNT: SIGNIFICANT CHANGE UP

## 2020-03-19 PROCEDURE — 99233 SBSQ HOSP IP/OBS HIGH 50: CPT

## 2020-03-19 RX ADMIN — Medication 1 TABLET(S): at 17:18

## 2020-03-19 RX ADMIN — APIXABAN 5 MILLIGRAM(S): 2.5 TABLET, FILM COATED ORAL at 17:18

## 2020-03-19 RX ADMIN — LATANOPROST 1 DROP(S): 0.05 SOLUTION/ DROPS OPHTHALMIC; TOPICAL at 22:21

## 2020-03-19 RX ADMIN — Medication 25 MILLIGRAM(S): at 17:18

## 2020-03-19 RX ADMIN — Medication 25 MILLIGRAM(S): at 06:58

## 2020-03-19 RX ADMIN — HYDROXYUREA 500 MILLIGRAM(S): 500 CAPSULE ORAL at 06:58

## 2020-03-19 RX ADMIN — PANTOPRAZOLE SODIUM 40 MILLIGRAM(S): 20 TABLET, DELAYED RELEASE ORAL at 06:58

## 2020-03-19 RX ADMIN — BRIMONIDINE TARTRATE 1 DROP(S): 2 SOLUTION/ DROPS OPHTHALMIC at 06:58

## 2020-03-19 RX ADMIN — BRIMONIDINE TARTRATE 1 DROP(S): 2 SOLUTION/ DROPS OPHTHALMIC at 17:18

## 2020-03-19 RX ADMIN — APIXABAN 5 MILLIGRAM(S): 2.5 TABLET, FILM COATED ORAL at 06:57

## 2020-03-19 RX ADMIN — HYDROXYUREA 500 MILLIGRAM(S): 500 CAPSULE ORAL at 17:19

## 2020-03-19 NOTE — CONSULT NOTE ADULT - SUBJECTIVE AND OBJECTIVE BOX
HPI:  84M with hx multiple myeloma, prostate CA (s/p brachytherapy), pAfib (on eliquis), recurrent DVTs, dementia, BPH, PVD, presents with subjective fever? possibly up to 101.3 (per ED provider from [facility]) from [facility]. Patient feels completely "fine." He is a poor historian, and is not sure why [facility] sent him to [facility]. ED provider informed medicine team that there was supposedly a confirmed COVID-19 (positive) patient on the same floor as Mr. Chicas. Otherwise, patient is without any symptoms. Simply states he is a bit tired from everything that has happened so far. Denies fever, chills, nausea, vomiting, changes in bowel movements, changes in urinary habits, lightheadedness/dizziness, myalgia/arthralgia. Patient is bedbound/wheelchair bound at baseline.    ED vitals: No recorded temperature, HR 93, /60, RR18, O2 sat 100% on RA  ED labs: WBC 6.04, Hb 7.9 (baseline 9-11), 1.7% metamyelocytes, INR 1.83, BUN 30, Cr 1.16, Albumin 3.2, Gluc 157  ED studies: grossly positive UA (chronic díaz), RVP negative, Chest X-ray is unremarkable  ED course: zosyn 3.375 given in ED (15 Mar 2020 14:51)      PAST MEDICAL & SURGICAL HISTORY:  BPH (benign prostatic hyperplasia)  Glaucoma  DVT (deep venous thrombosis)  Myeloma  Prostate cancer  H/O hernia repair        REVIEW OF SYSTEMS:    General:	 no weakness; no fevers, no chills  Skin/Breast: no rash  Respiratory and Thorax: no SOB, +cough  Cardiovascular:	No chest pain  Gastrointestinal:	 no nausea, vomiting , diarrhea  Genitourinary:	no dysuria, no difficulty urinating, no hematuria  Musculoskeletal:	no weakness, no joint swelling/pain  Neurological:	no focal weakness/numbness  Endocrine:	no polyuria, no polydipsia      ANTIBIOTICS:  MEDICATIONS  (STANDING):  apixaban 5 milliGRAM(s) Oral every 12 hours  brimonidine 0.2% Ophthalmic Solution 1 Drop(s) Both EYES two times a day  hydroxyurea 500 milliGRAM(s) Oral every 12 hours  influenza   Vaccine 0.5 milliLiter(s) IntraMuscular once  latanoprost 0.005% Ophthalmic Solution 1 Drop(s) Both EYES at bedtime  metoprolol tartrate 25 milliGRAM(s) Oral every 12 hours  multivitamin 1 Tablet(s) Oral daily  pantoprazole    Tablet 40 milliGRAM(s) Oral before breakfast  tamsulosin 0.4 milliGRAM(s) Oral at bedtime    MEDICATIONS  (PRN):  acetaminophen   Tablet .. 650 milliGRAM(s) Oral every 6 hours PRN Mild Pain (1 - 3)  melatonin 5 milliGRAM(s) Oral at bedtime PRN Insomnia      Allergies    No Known Allergies    Intolerances        SOCIAL HISTORY:    FAMILY HISTORY:      Vital Signs Last 24 Hrs  T(C): 36.9 (19 Mar 2020 13:57), Max: 37.4 (19 Mar 2020 05:50)  T(F): 98.4 (19 Mar 2020 13:57), Max: 99.4 (19 Mar 2020 05:50)  HR: 64 (19 Mar 2020 13:57) (64 - 76)  BP: 115/65 (19 Mar 2020 13:57) (113/55 - 154/65)  BP(mean): --  RR: 18 (19 Mar 2020 13:57) (17 - 18)  SpO2: 97% (19 Mar 2020 13:57) (94% - 97%)    PHYSICAL EXAM:  Constitutional:Well-developed, well nourished  Eyes:JANES, EOMI  Ear/Nose/Throat: no oral lesion, no sinus tenderness on percussion	  Neck:no JVD, no lymphadenopathy, supple  Respiratory: CTA lynn  Cardiovascular: S1S2 RRR, no murmurs  Gastrointestinal:soft, (+) BS, no HSM  Extremities:no e/e/c  Vascular: DP Pulse:	right normal; left normal            LABS:                        9.0    3.18  )-----------( 188      ( 18 Mar 2020 06:21 )             29.2     03-18    135  |  101  |  17  ----------------------------<  113<H>  4.4   |  23  |  0.87    Ca    8.6      18 Mar 2020 06:21  Phos  3.6     03-18  Mg     1.7     03-18    TPro  7.4  /  Alb  3.2<L>  /  TBili  0.3  /  DBili  x   /  AST  30  /  ALT  16  /  AlkPhos  53  03-18          MICROBIOLOGY: Culture - Urine (03.15.20 @ 14:28)    -  Ampicillin/Sulbactam: R >16/8 Enterobacter, Citrobacter, and Serratia may develop resistance during prolonged therapy (3-4 days)    -  Ampicillin/Sulbactam: R >16/8 Enterobacter, Citrobacter, and Serratia may develop resistance during prolonged therapy (3-4 days)    -  Ampicillin: R >16 These ampicillin results predict results for amoxicillin    -  Ampicillin: S <=2 Predicts results to ampicillin/sulbactam, amoxacillin-clavulanate and  piperacillin-tazobactam.    -  Ampicillin: R >16 These ampicillin results predict results for amoxicillin    -  Meropenem: S <=1    -  Meropenem: S 0.094    -  Cefazolin: R >16    -  Cefazolin: R 16    -  Cefazolin: R >16    -  Ceftazidime: R >16    -  Ceftriaxone: S <=1 Enterobacter, Citrobacter, and Serratia may develop resistance during prolonged therapy    -  Ceftriaxone: R >32 Enterobacter, Citrobacter, and Serratia may develop resistance during prolonged therapy    -  Ciprofloxacin: R >2    -  Ciprofloxacin: R >2    -  Daptomycin: S 0.5    -  Nitrofurantoin: I 64 Should not be used to treat pyelonephritis    -  Nitrofurantoin: S <=32 Should not be used to treat pyelonephritis.    -  Nitrofurantoin: S <=32 Should not be used to treat pyelonephritis    -  Oxacillin: R >2    -  Penicillin: R 8    -  Levofloxacin: S <=1    -  Levofloxacin: R >4    -  Linezolid: S 2    -  Gentamicin: S 0.19    -  Gentamicin: S <=1    -  Gentamicin: S <=1    -  Erythromycin: R 8    -  Cefepime: S <=2    -  Cefepime: S 8    -  Piperacillin/Tazobactam: S <=8    -  Piperacillin/Tazobactam: R >64    -  RIF- Rifampin: S <=1 Should not be used as monotherapy    -  Tetra/Doxy: S <=1    -  Tetra/Doxy: R >8    -  Tobramycin: S <=2    -  Tobramycin: S <=2    -  Trimethoprim/Sulfamethoxazole: R >2/38    -  Trimethoprim/Sulfamethoxazole: S <=0.5/9.5    -  Trimethoprim/Sulfamethoxazole: S <=0.5/9.5    -  Trimethoprim/Sulfamethoxazole: S <=0.5/9.5    -  Vancomycin: S 0.75    -  Vancomycin: S 2    -  Vancomycin: S 1    Specimen Source: .Urine Clean Catch (Midstream)    Culture Results:   >100,000 CFU/ml Morganella morganii  >100,000 CFU/ml Stenotrophomonas maltophilia  >100,000 CFU/ml Enterococcus faecalis  >100,000 CFU/ml Methicillin resistant Staphylococcus aureus  Result called to and read back by_ Ms. ANGELAAidee Lovett RN  03/17/202009:21:40  40,000 CFU/ml Corynebacterium jeikeium  >100,000 CFU/ml Organism most closely resembling Enterobacter cloacae  complex    Organism Identification: Morganella morganii  Enterobacter cloacae complex  Enterobacter cloacae complex  Stenotrophomonas maltophilia  Enterococcus faecalis  Methicillin resistant Staphylococcus aureus  Methicillin resistant Staphylococcus aureus  Corynebacterium jeikeium    Organism: Methicillin resistant Staphylococcus aureus    Organism: Corynebacterium jeikeium    Organism: Enterococcus faecalis    Organism: Enterobacter cloacae complex    Organism: Enterobacter cloacae complex    Organism: Morganella morganii    Organism: Stenotrophomonas maltophilia    Organism: Methicillin resistant Staphylococcus aureus    Method Type: IRA    Method Type: IRA    Method Type: ETEST    Method Type: IRA    Method Type: ETEST    Method Type: IRA    Method Type: IRA    Method Type: ETEST      RADIOLOGY & ADDITIONAL STUDIES: reviewed

## 2020-03-19 NOTE — PROGRESS NOTE ADULT - SUBJECTIVE AND OBJECTIVE BOX
Interventional, Pulmonary, Critical, Chest Special Procedures.    Pt was seen and fully examined by myself.     Time spent with patient in minutes:127  High risk  Full PPE assembled and worn throughout encounter  Patient is a 84y old  Male who presents with a chief complaint of COVID-19 rule out (19 Mar 2020 11:42)    HPI:  84M with hx multiple myeloma, prostate CA (s/p brachytherapy), pAfib (on eliquis), recurrent DVTs, dementia, BPH, PVD, presents with subjective fever? possibly up to 101.3 (per ED provider from [facility]) from [facility]. Patient feels completely "fine." He is a poor historian, and is not sure why [facility] sent him to [facility]. ED provider informed medicine team that there was supposedly a confirmed COVID-19 (positive) patient on the same floor as Mr. Chicas. Otherwise, patient is without any symptoms. Simply states he is a bit tired from everything that has happened so far. Denies fever, chills, nausea, vomiting, changes in bowel movements, changes in urinary habits, lightheadedness/dizziness, myalgia/arthralgia. Patient is bedbound/wheelchair bound at baseline.    ED vitals: No recorded temperature, HR 93, /60, RR18, O2 sat 100% on RA  ED labs: WBC 6.04, Hb 7.9 (baseline 9-11), 1.7% metamyelocytes, INR 1.83, BUN 30, Cr 1.16, Albumin 3.2, Gluc 157  ED studies: grossly positive UA (chronic díaz), RVP negative, Chest X-ray is unremarkable  ED course: zosyn 3.375 given in ED (15 Mar 2020 14:51)      REVIEW OF SYSTEMS:as above  Constitutional: No fever, weight loss, chills or fatigue  Eyes: No eye pain, visual disturbances, or discharge  ENMT:  No difficulty hearing, tinnitus, vertigo; No sinus or throat pain. No epistaxis, dysphagia, dysphonia, hoarseness or odynophagia  Neck: No pain, stiffness or neck swelling.  No masses or deformities  Respiratory: No cough, wheezing, hemoptysis  - COPD  - ILD   - PE   - ASTHMA     - PNEUMONIA  Cardiovascular: No chest pain, dysrhythmia, palpitations, dizziness or edema - CAD   - CHF   - HTN  Gastrointestinal: No abdominal or epigastric pain. No nausea, vomiting or hematemesis; No diarrhea or constipation. No melena or hematochezia, Icterus.          Genitourinary: No dysuria, frequency, hematuria or incontinence   - CKD/SARAH      - ESRD  Neurological: No headaches, memory loss, loss of strength, numbness or tremors      -DEMENTIA     - STROKE    - SEIZURE  Skin: No itching, burning, rashes or lesions   Lymph Nodes: No enlarged glands  Endocrine: No heat or cold intolerance; No hair loss       - DM     - THYROID DISORDER  Musculoskeletal: No joint pain or swelling; No muscle, back or extremity pain, No edema  Psychiatric: No depression, anxiety, mood swings or difficulty sleeping  Heme/Lymph: No easy bruising or bleeding gums         - ANEMIA      - CANCER   -COAGULOPATHY  Allergy and Immunologic: No hives or eczema    PAST MEDICAL & SURGICAL HISTORY:  BPH (benign prostatic hyperplasia)  Glaucoma  DVT (deep venous thrombosis)  Myeloma  Prostate cancer  H/O hernia repair    FAMILY HISTORY:    SOCIAL HISTORY:      - Tobacco     - ETOH    Allergies    No Known Allergies    Intolerances      Vital Signs Last 24 Hrs  T(C): 36.9 (19 Mar 2020 13:57), Max: 37.4 (19 Mar 2020 05:50)  T(F): 98.4 (19 Mar 2020 13:57), Max: 99.4 (19 Mar 2020 05:50)  HR: 64 (19 Mar 2020 13:57) (64 - 76)  BP: 115/65 (19 Mar 2020 13:57) (113/55 - 154/65)  BP(mean): --  RR: 18 (19 Mar 2020 13:57) (17 - 18)  SpO2: 97% (19 Mar 2020 13:57) (94% - 97%)    03-18 @ 07:01  -  03-19 @ 07:00  --------------------------------------------------------  IN: 0 mL / OUT: 1650 mL / NET: -1650 mL          MEDICATIONS:  MEDICATIONS  (STANDING):  apixaban 5 milliGRAM(s) Oral every 12 hours  brimonidine 0.2% Ophthalmic Solution 1 Drop(s) Both EYES two times a day  hydroxyurea 500 milliGRAM(s) Oral every 12 hours  influenza   Vaccine 0.5 milliLiter(s) IntraMuscular once  latanoprost 0.005% Ophthalmic Solution 1 Drop(s) Both EYES at bedtime  metoprolol tartrate 25 milliGRAM(s) Oral every 12 hours  multivitamin 1 Tablet(s) Oral daily  pantoprazole    Tablet 40 milliGRAM(s) Oral before breakfast  tamsulosin 0.4 milliGRAM(s) Oral at bedtime    MEDICATIONS  (PRN):  acetaminophen   Tablet .. 650 milliGRAM(s) Oral every 6 hours PRN Mild Pain (1 - 3)  melatonin 5 milliGRAM(s) Oral at bedtime PRN Insomnia      PHYSICAL EXAM:  Un Comfortable, no immediate distress  Eyes: PERRL, EOM intact; conjunctiva and sclera clear  Head: Normocephalic;  No Trauma  ENMT: No nasal discharge, hoarseness, cough or hemoptysis  Neck: Supple; non tender; no masses or deformities.    No JVD  Respiratory: CTA,  - WHEEZING   - RHONCHI  - RALES  - CRACKLES.  Diminished breath sounds  BILATERAL  RIGHT  LEFT  Cardiovascular: Regular rate and rhythm. S1 and S2 Normal; No murmurs, gallops or rubs     - PPM/AICD  Gastrointestinal: Soft non-tender, non-distended; Normal bowel sounds; No hepatosplenomegaly.     -PEG    -  GT   - DÍAZ  Genitourinary: No costovertebral angle tenderness. No dysuria  Extremities: AROM, No clubbing, cyanosis or edema    Vascular: Peripheral pulses palpable 2+ bilaterally  Neurological: Alert and responisve to stimuli   Skin: Warm and dry. No obvious rash  Lymph Nodes: No acute cervical or supraclavicular adenopathy  Psychiatric: Cooperative and appropriate mood    DEVICES:  - DENTURES   +IV R / L     - ETUBE   -TRACH   -CTUBE  R / L    LABS:                          9.0    3.18  )-----------( 188      ( 18 Mar 2020 06:21 )             29.2     03-18    135  |  101  |  17  ----------------------------<  113<H>  4.4   |  23  |  0.87    Ca    8.6      18 Mar 2020 06:21  Phos  3.6     03-18  Mg     1.7     03-18    TPro  7.4  /  Alb  3.2<L>  /  TBili  0.3  /  DBili  x   /  AST  30  /  ALT  16  /  AlkPhos  53  03-18      RADIOLOGY & ADDITIONAL STUDIES (The following images were personally reviewed):

## 2020-03-19 NOTE — PROGRESS NOTE ADULT - ASSESSMENT
ASSESSMENT/[id number] with hx multiple myeloma, prostate CA (s/p brachytherapy), pAfib (on eliquis), recurrent DVTs, dementia, BPH, PVD, presents with subjective fever? possibly up to 101.3 (per ED provider from [facility]) from [facility]. Admit for COVID-19.    1. O2 attempt off  2. Bronchodilators:  Atrovent/ albuterol q 4 – 6 hours as needed  3. Corticosteroids: off  4. ID/Antibiotics: off pending CX/  5. Cardiac/HTN: optimize BP  6. GI: Rx/ prophylaxis c PPI/H2B  7. Heme: Rx/VT prophylaxis c SQH/SCD/AC pt on Eliquis  8. Aspiration precautions at all times  Discussed with managing team

## 2020-03-19 NOTE — CONSULT NOTE ADULT - ASSESSMENT
83 yo male with MM on hydroxyurea, prostate cancer, p/w fever and cough from [facility] in setting of COVID-19 outbreak there--confirmed PCR positive here. Likely with asymptomatic bacteriuria--multiple organisms on urine culture raise concern for contamination. Would continue to observe off antibiotics and only target what grew from urine culture if becomes newly febrile/septic from a urinary source; if that were to be the case, Zosyn + TMP/SMX would be an appropriate regimen.  Please reconsult with ?  ID Team 2

## 2020-03-19 NOTE — PROGRESS NOTE ADULT - PROBLEM SELECTOR PLAN 1
Patient is asymptomatic. Exposure risk: floormate was positive, and patient comes from [facility] (high risk exposure site, given possible transmission from multiple other people, physical therapists, physiatrists, PCP, etc.).  - COVID PCR +: No cough, sob, desaturation   - Not lymphopenic, serum ferritin ferritin 325, procal 0.36, CRP 3.91, and   - Supportive care

## 2020-03-19 NOTE — PROGRESS NOTE ADULT - SUBJECTIVE AND OBJECTIVE BOX
Interval / Overnight: PRUDENCIO    Subjective: Mr. Daniels feels well this morning. His night was mostly good. He denies fevers, chills, ha, cough, sore throat, n/v. His urinary symptoms are improved.     VITAL SIGNS:  Vital Signs Last 24 Hrs  T(C): 37.4 (19 Mar 2020 05:50), Max: 37.4 (19 Mar 2020 05:50)  T(F): 99.4 (19 Mar 2020 05:50), Max: 99.4 (19 Mar 2020 05:50)  HR: 76 (19 Mar 2020 05:50) (63 - 76)  BP: 118/62 (19 Mar 2020 05:50) (113/55 - 154/65)  BP(mean): --  RR: 18 (19 Mar 2020 05:50) (17 - 18)  SpO2: 94% (19 Mar 2020 05:50) (94% - 95%)    PHYSICAL EXAM:    General: in NAD, lying comfortably in bed, sleeping when i walked in   HEENT: normocephalic, atraumatic; PERRL, anicteric sclera; MMM, no pharyngeal exudates   Neck: supple, no thyromegaly, no lymphadenopathy  Cardiovascular: +S1/S2, irreg irreg, no M/G/R  Respiratory: Breathing comfortably, no accessory muscle use; decreased breath sounds BL, mild rales at the bases   Gastrointestinal: soft, NT/ND; +BSx4  : díaz with clear, yellow urine   Extremities: WWP; no edema    Vascular: 2+ radial BL, 2+ DP BL  Neurological: Alert to person, place, year    MEDICATIONS:  MEDICATIONS  (STANDING):  apixaban 5 milliGRAM(s) Oral every 12 hours  brimonidine 0.2% Ophthalmic Solution 1 Drop(s) Both EYES two times a day  hydroxyurea 500 milliGRAM(s) Oral every 12 hours  influenza   Vaccine 0.5 milliLiter(s) IntraMuscular once  latanoprost 0.005% Ophthalmic Solution 1 Drop(s) Both EYES at bedtime  metoprolol tartrate 25 milliGRAM(s) Oral every 12 hours  multivitamin 1 Tablet(s) Oral daily  pantoprazole    Tablet 40 milliGRAM(s) Oral before breakfast  tamsulosin 0.4 milliGRAM(s) Oral at bedtime    MEDICATIONS  (PRN):  acetaminophen   Tablet .. 650 milliGRAM(s) Oral every 6 hours PRN Mild Pain (1 - 3)  melatonin 5 milliGRAM(s) Oral at bedtime PRN Insomnia      ALLERGIES:  Allergies    No Known Allergies    Intolerances        LABS:                        9.0    3.18  )-----------( 188      ( 18 Mar 2020 06:21 )             29.2     03-18    135  |  101  |  17  ----------------------------<  113<H>  4.4   |  23  |  0.87    Ca    8.6      18 Mar 2020 06:21  Phos  3.6     03-18  Mg     1.7     03-18    TPro  7.4  /  Alb  3.2<L>  /  TBili  0.3  /  DBili  x   /  AST  30  /  ALT  16  /  AlkPhos  53  03-18        CAPILLARY BLOOD GLUCOSE          RADIOLOGY & ADDITIONAL TESTS: Reviewed.

## 2020-03-19 NOTE — PROGRESS NOTE ADULT - PROBLEM SELECTOR PLAN 6
Patient has recurrent history of DVT. Heparin drip was started in 8/2019 admission and switched to lovenox 70 mg q12h, but now on eliquis (for DVT/Afib)  - c/w anticoag w/ apixaban as per below    #Afib  Was originally not on anticoag or rate control per outpatient or on rate control per PCP's office ([place], [phone]). Found to have RVR during hospital course in 8/19  - C/w eliquis  - C/w metoprolol tartrate 25 mg BID

## 2020-03-20 DIAGNOSIS — B34.2 CORONAVIRUS INFECTION, UNSPECIFIED: ICD-10-CM

## 2020-03-20 LAB
CULTURE RESULTS: SIGNIFICANT CHANGE UP
CULTURE RESULTS: SIGNIFICANT CHANGE UP
GLUCOSE BLDC GLUCOMTR-MCNC: 129 MG/DL — HIGH (ref 70–99)
SPECIMEN SOURCE: SIGNIFICANT CHANGE UP
SPECIMEN SOURCE: SIGNIFICANT CHANGE UP

## 2020-03-20 RX ADMIN — BRIMONIDINE TARTRATE 1 DROP(S): 2 SOLUTION/ DROPS OPHTHALMIC at 06:07

## 2020-03-20 RX ADMIN — TAMSULOSIN HYDROCHLORIDE 0.4 MILLIGRAM(S): 0.4 CAPSULE ORAL at 21:02

## 2020-03-20 RX ADMIN — Medication 1 TABLET(S): at 12:01

## 2020-03-20 RX ADMIN — LATANOPROST 1 DROP(S): 0.05 SOLUTION/ DROPS OPHTHALMIC; TOPICAL at 21:02

## 2020-03-20 RX ADMIN — APIXABAN 5 MILLIGRAM(S): 2.5 TABLET, FILM COATED ORAL at 18:17

## 2020-03-20 RX ADMIN — HYDROXYUREA 500 MILLIGRAM(S): 500 CAPSULE ORAL at 06:06

## 2020-03-20 RX ADMIN — HYDROXYUREA 500 MILLIGRAM(S): 500 CAPSULE ORAL at 18:24

## 2020-03-20 RX ADMIN — APIXABAN 5 MILLIGRAM(S): 2.5 TABLET, FILM COATED ORAL at 06:06

## 2020-03-20 RX ADMIN — BRIMONIDINE TARTRATE 1 DROP(S): 2 SOLUTION/ DROPS OPHTHALMIC at 18:17

## 2020-03-20 RX ADMIN — PANTOPRAZOLE SODIUM 40 MILLIGRAM(S): 20 TABLET, DELAYED RELEASE ORAL at 06:08

## 2020-03-20 RX ADMIN — Medication 25 MILLIGRAM(S): at 06:06

## 2020-03-20 NOTE — PROGRESS NOTE ADULT - ASSESSMENT
ASSESSMENT/[id number] with hx multiple myeloma, prostate CA (s/p brachytherapy), pAfib (on eliquis), recurrent DVTs, dementia, BPH, PVD, presents with subjective fever? possibly up to 101.3 (per ED provider from [facility]) from [facility]. Admit for COVID-19.    1. O2 attempt off  2. Bronchodilators:  Atrovent/ albuterol q 4 – 6 hours as needed  3. Corticosteroids: off  4. ID/Antibiotics: off pending CX/  5. Cardiac/HTN: optimize BP  6. GI: Rx/ prophylaxis c PPI/H2B  7. Heme: Rx/VT prophylaxis c SQH/SCD/AC pt on Eliquis  8. Aspiration precautions at all times  Discussed with managing team

## 2020-03-20 NOTE — PROGRESS NOTE ADULT - PROBLEM SELECTOR PLAN 2
Hx of BPH, on chronic indwelling díaz  - U/A grossly positive, but patient asymptomatic, afebrile, no elevated WBC: will hold abx for now  - Ucx showing > 100K CFUs for morgonella, stenotrophomonas, MRSA, enterobacter  - Appreciate ID input --> if patient develops signs of systemic infection will treat with TMP / SMX and Zosyn. Will hold off for now.

## 2020-03-20 NOTE — PROGRESS NOTE ADULT - SUBJECTIVE AND OBJECTIVE BOX
Interventional, Pulmonary, Critical, Chest Special Procedures.    Pt was seen and fully examined by myself.     Time spent with patient in minutes:87  High risk  Full PPE assembled and worn throughout encounter  Patient is a 84y old  Male who presents with a chief complaint of COVID-19 rule out (20 Mar 2020 11:03) The patient eupneic on RA, morer engaging today, answering simple questions.    HPI:  84M with hx multiple myeloma, prostate CA (s/p brachytherapy), pAfib (on eliquis), recurrent DVTs, dementia, BPH, PVD, presents with subjective fever? possibly up to 101.3 (per ED provider from [facility]) from [facility]. Patient feels completely "fine." He is a poor historian, and is not sure why [facility] sent him to [facility]. ED provider informed medicine team that there was supposedly a confirmed COVID-19 (positive) patient on the same floor as Mr. Chicas. Otherwise, patient is without any symptoms. Simply states he is a bit tired from everything that has happened so far. Denies fever, chills, nausea, vomiting, changes in bowel movements, changes in urinary habits, lightheadedness/dizziness, myalgia/arthralgia. Patient is bedbound/wheelchair bound at baseline.    ED vitals: No recorded temperature, HR 93, /60, RR18, O2 sat 100% on RA  ED labs: WBC 6.04, Hb 7.9 (baseline 9-11), 1.7% metamyelocytes, INR 1.83, BUN 30, Cr 1.16, Albumin 3.2, Gluc 157  ED studies: grossly positive UA (chronic díaz), RVP negative, Chest X-ray is unremarkable  ED course: zosyn 3.375 given in ED (15 Mar 2020 14:51)      REVIEW OF SYSTEMS::as above  Constitutional: No fever, weight loss, chills or fatigue  Eyes: No eye pain, visual disturbances, or discharge  ENMT:  No difficulty hearing, tinnitus, vertigo; No sinus or throat pain. No epistaxis, dysphagia, dysphonia, hoarseness or odynophagia  Neck: No pain, stiffness or neck swelling.  No masses or deformities  Respiratory: No cough, wheezing, hemoptysis  - COPD  - ILD   - PE   - ASTHMA     - PNEUMONIA  Cardiovascular: No chest pain, dysrhythmia, palpitations, dizziness or edema - CAD   - CHF   - HTN  Gastrointestinal: No abdominal or epigastric pain. No nausea, vomiting or hematemesis; No diarrhea or constipation. No melena or hematochezia, Icterus.          Genitourinary: No dysuria, frequency, hematuria or incontinence   - CKD/SARAH      - ESRD  Neurological: No headaches, memory loss, loss of strength, numbness or tremors      -DEMENTIA     - STROKE    - SEIZURE  Skin: No itching, burning, rashes or lesions   Lymph Nodes: No enlarged glands  Endocrine: No heat or cold intolerance; No hair loss       - DM     - THYROID DISORDER  Musculoskeletal: No joint pain or swelling; No muscle, back or extremity pain, No edema  Psychiatric: No depression, anxiety, mood swings or difficulty sleeping  Heme/Lymph: No easy bruising or bleeding gums         - ANEMIA      - CANCER   -COAGULOPATHY  Allergy and Immunologic: No hives or eczema    PAST MEDICAL & SURGICAL HISTORY:  BPH (benign prostatic hyperplasia)  Glaucoma  DVT (deep venous thrombosis)  Myeloma  Prostate cancer  H/O hernia repair    FAMILY HISTORY:    SOCIAL HISTORY:      - Tobacco     - ETOH    Allergies    No Known Allergies    Intolerances      Vital Signs Last 24 Hrs  T(C): 36.8 (20 Mar 2020 11:59), Max: 37.5 (19 Mar 2020 22:21)  T(F): 98.3 (20 Mar 2020 11:59), Max: 99.5 (19 Mar 2020 22:21)  HR: 56 (20 Mar 2020 11:59) (56 - 70)  BP: 92/55 (20 Mar 2020 11:59) (92/55 - 127/75)  BP(mean): --  RR: 18 (20 Mar 2020 11:59) (18 - 18)  SpO2: 94% (20 Mar 2020 11:59) (94% - 96%)    03-19 @ 07:01  -  03-20 @ 07:00  --------------------------------------------------------  IN: 0 mL / OUT: 775 mL / NET: -775 mL          MEDICATIONS:  MEDICATIONS  (STANDING):  apixaban 5 milliGRAM(s) Oral every 12 hours  brimonidine 0.2% Ophthalmic Solution 1 Drop(s) Both EYES two times a day  hydroxyurea 500 milliGRAM(s) Oral every 12 hours  influenza   Vaccine 0.5 milliLiter(s) IntraMuscular once  latanoprost 0.005% Ophthalmic Solution 1 Drop(s) Both EYES at bedtime  metoprolol tartrate 25 milliGRAM(s) Oral every 12 hours  multivitamin 1 Tablet(s) Oral daily  pantoprazole    Tablet 40 milliGRAM(s) Oral before breakfast  tamsulosin 0.4 milliGRAM(s) Oral at bedtime    MEDICATIONS  (PRN):  acetaminophen   Tablet .. 650 milliGRAM(s) Oral every 6 hours PRN Mild Pain (1 - 3)  melatonin 5 milliGRAM(s) Oral at bedtime PRN Insomnia      PHYSICAL EXAM:  Un Comfortable, no immediate distress  Eyes: PERRL, EOM intact; conjunctiva and sclera clear  Head: Normocephalic;  No Trauma  ENMT: No nasal discharge, hoarseness, cough or hemoptysis  Neck: Supple; non tender; no masses or deformities.    No JVD  Respiratory: CTA,  - WHEEZING   - RHONCHI  - RALES  - CRACKLES.  Diminished breath sounds  BILATERAL  RIGHT  LEFT  Cardiovascular: Regular rate and rhythm. S1 and S2 Normal; No murmurs, gallops or rubs     - PPM/AICD  Gastrointestinal: Soft non-tender, non-distended; Normal bowel sounds; No hepatosplenomegaly.     -PEG    -  GT   - DÍAZ  Genitourinary: No costovertebral angle tenderness. No dysuria  Extremities: AROM, No clubbing, cyanosis or edema    Vascular: Peripheral pulses palpable 2+ bilaterally  Neurological: Alert and responisve to stimuli   Skin: Warm and dry. No obvious rash  Lymph Nodes: No acute cervical or supraclavicular adenopathy  Psychiatric: Cooperative and appropriate mood    DEVICES:  - DENTURES   +IV R / L     - ETUBE   -TRACH   -CTUBE  R / L    LABS:              RADIOLOGY & ADDITIONAL STUDIES (The following images were personally reviewed):

## 2020-03-20 NOTE — PROGRESS NOTE ADULT - SUBJECTIVE AND OBJECTIVE BOX
Interval / Overnight: PRUDENCIO    Subjective: Mr. Daniels is without cough, sore throat, fever, chills, chest pain. He does endorse some urinary discomfort and wants his Díaz removed.     VITAL SIGNS:  Vital Signs Last 24 Hrs  T(C): 37.5 (19 Mar 2020 22:21), Max: 37.5 (19 Mar 2020 22:21)  T(F): 99.5 (19 Mar 2020 22:21), Max: 99.5 (19 Mar 2020 22:21)  HR: 60 (20 Mar 2020 06:29) (60 - 70)  BP: 95/59 (20 Mar 2020 06:29) (95/59 - 127/75)  BP(mean): --  RR: 18 (20 Mar 2020 06:29) (18 - 18)  SpO2: 96% (20 Mar 2020 06:29) (96% - 97%)    PHYSICAL EXAM:    General: in NAD, lying comfortably in bed, non ill appearing  Skin: Normal turgor, non-dry appearing   HEENT: normocephalic, atraumatic; PERRL, anicteric sclera; MMM, no pharyngeal exudates   Neck: supple, no thyromegaly, no lymphadenopathy  Cardiovascular: +S1/S2, irreg irreg, no M/G/R  Respiratory: Breathing comfortably, no accessory muscle use; decreased breath sounds BL, mild rales at the bases   Gastrointestinal: soft, NT/ND; +BSx4  : Urine in díaz is clear and yellow. Winces when suprapubic pressure applied.   : díaz with clear, yellow urine   Extremities: WWP; no edema    Vascular: 2+ radial BL, 2+ DP BL  Neurological: Alert to person, place, year    MEDICATIONS:  MEDICATIONS  (STANDING):  apixaban 5 milliGRAM(s) Oral every 12 hours  brimonidine 0.2% Ophthalmic Solution 1 Drop(s) Both EYES two times a day  hydroxyurea 500 milliGRAM(s) Oral every 12 hours  influenza   Vaccine 0.5 milliLiter(s) IntraMuscular once  latanoprost 0.005% Ophthalmic Solution 1 Drop(s) Both EYES at bedtime  metoprolol tartrate 25 milliGRAM(s) Oral every 12 hours  multivitamin 1 Tablet(s) Oral daily  pantoprazole    Tablet 40 milliGRAM(s) Oral before breakfast  tamsulosin 0.4 milliGRAM(s) Oral at bedtime    MEDICATIONS  (PRN):  acetaminophen   Tablet .. 650 milliGRAM(s) Oral every 6 hours PRN Mild Pain (1 - 3)  melatonin 5 milliGRAM(s) Oral at bedtime PRN Insomnia      ALLERGIES:  Allergies    No Known Allergies    Intolerances        LABS:              CAPILLARY BLOOD GLUCOSE      POCT Blood Glucose.: 129 mg/dL (20 Mar 2020 09:16)      RADIOLOGY & ADDITIONAL TESTS: Reviewed. HOSPITAL COURSE:  84M with hx multiple myeloma, prostate CA (s/p brachytherapy), pAfib (on eliquis), recurrent DVTs, dementia, BPH, PVD, presents from [facility] with subjective fevers now confirmed COVID +. As per ED provider note, they spoke with referring doctor from [facility] who said patient was febrile to 101 with cough and confirmed COVID exposure. Patient admitted and found to be COVID positive. He has been asymptomatic without respiratory or GI symptoms @ [facility]. Of note, patient has a chronic indwelling díaz and has confirmed urine culture with > 100K CFUs of four different organisms. Patient only mildly symptomatic and given mental status unclear if the symptoms he endorses are related to UTI. This information, combined with ID input -- we did not treat the UTI. If patient is to become symptomatic (urine color change, febrile, etc...) would initiate TMP/SMX + Zosyn.     Interval / Overnight: PRUDENCIO    Subjective: Mr. Daniels is without cough, sore throat, fever, chills, chest pain. He does endorse some urinary discomfort and wants his Díaz removed.     VITAL SIGNS:  Vital Signs Last 24 Hrs  T(C): 37.5 (19 Mar 2020 22:21), Max: 37.5 (19 Mar 2020 22:21)  T(F): 99.5 (19 Mar 2020 22:21), Max: 99.5 (19 Mar 2020 22:21)  HR: 60 (20 Mar 2020 06:29) (60 - 70)  BP: 95/59 (20 Mar 2020 06:29) (95/59 - 127/75)  BP(mean): --  RR: 18 (20 Mar 2020 06:29) (18 - 18)  SpO2: 96% (20 Mar 2020 06:29) (96% - 97%)    PHYSICAL EXAM:    General: in NAD, lying comfortably in bed, non ill appearing  Skin: Normal turgor, non-dry appearing   HEENT: normocephalic, atraumatic; PERRL, anicteric sclera; MMM, no pharyngeal exudates   Neck: supple, no thyromegaly, no lymphadenopathy  Cardiovascular: +S1/S2, irreg irreg, no M/G/R  Respiratory: Breathing comfortably, no accessory muscle use; decreased breath sounds BL, mild rales at the bases   Gastrointestinal: soft, NT/ND; +BSx4  : Urine in díaz is clear and yellow. Winces when suprapubic pressure applied.   : díaz with clear, yellow urine   Extremities: WWP; no edema    Vascular: 2+ radial BL, 2+ DP BL  Neurological: Alert to person, place, year    MEDICATIONS:  MEDICATIONS  (STANDING):  apixaban 5 milliGRAM(s) Oral every 12 hours  brimonidine 0.2% Ophthalmic Solution 1 Drop(s) Both EYES two times a day  hydroxyurea 500 milliGRAM(s) Oral every 12 hours  influenza   Vaccine 0.5 milliLiter(s) IntraMuscular once  latanoprost 0.005% Ophthalmic Solution 1 Drop(s) Both EYES at bedtime  metoprolol tartrate 25 milliGRAM(s) Oral every 12 hours  multivitamin 1 Tablet(s) Oral daily  pantoprazole    Tablet 40 milliGRAM(s) Oral before breakfast  tamsulosin 0.4 milliGRAM(s) Oral at bedtime    MEDICATIONS  (PRN):  acetaminophen   Tablet .. 650 milliGRAM(s) Oral every 6 hours PRN Mild Pain (1 - 3)  melatonin 5 milliGRAM(s) Oral at bedtime PRN Insomnia      ALLERGIES:  Allergies    No Known Allergies    Intolerances        LABS:              CAPILLARY BLOOD GLUCOSE      POCT Blood Glucose.: 129 mg/dL (20 Mar 2020 09:16)      RADIOLOGY & ADDITIONAL TESTS: Reviewed.

## 2020-03-20 NOTE — PROGRESS NOTE ADULT - PROBLEM SELECTOR PLAN 1
PCR confirmed. No cough, sob, fevers while in hospital. No requiring O2. Lung exam is clear.   - Not lymphopenic, serum ferritin ferritin 325, procal 0.36, CRP 3.91, and   - Supportive care

## 2020-03-20 NOTE — PROGRESS NOTE ADULT - PROBLEM SELECTOR PLAN 3
Initial Anesthesia Post-op Note    Patient: Minnie Lee  Procedure(s) Performed: HEMIARTHROPLASTY HIP-LEFT - LEFT  Anesthesia type: General w/Full Monitor    Vital Last Value   Temperature 36.8 °C (98.2 °F) (08/06/18 1407)   Pulse 92 (08/06/18 1407)   Respiratory Rate 28 (08/06/18 1407)   Non-Invasive   Blood Pressure (!) 148/92 (08/06/18 1407)   Arterial  Blood Pressure     Pulse Oximetry 90 % (08/06/18 1407)     Last 24 I/O:   Intake/Output Summary (Last 24 hours) at 08/06/18 1818  Last data filed at 08/06/18 1654   Gross per 24 hour   Intake             1982 ml   Output             2150 ml   Net             -168 ml       PATIENT LOCATION: PACU Phase 1  POST-OP VITAL SIGNS: stable  LEVEL OF CONSCIOUSNESS: awake  RESPIRATORY STATUS: face mask  CARDIOVASCULAR: blood pressure returned to baseline  HYDRATION: euvolemic    PAIN MANAGEMENT: well controlled  NAUSEA: None  AIRWAY PATENCY: patent  POST-OP ASSESSMENT: no complications  COMPLICATIONS: none  HANDOFF:  Handoff to receiving nurse was performed and questions were answered      
Hx of multiple myeloma; was receiving bortezomib treatment 2 weeks on, 1 week off. Also on dexamethasone as outpatient (no longer).  - Outpatient hematologist needs to be contacted (still not contacted as of 3/18/2020).   - C/w home medication hydroxyurea 500 mg q12h
Hx of multiple myeloma; was receiving bortezomib treatment 2 weeks on, 1 week off. Also on dexamethasone as outpatient (no longer).  - f/u outpatient heme-onc for recs  - C/w home medication hydroxyurea 500 mg q12h  - Obtain collateral from patient's PCP/oncologist
Hx of multiple myeloma; was receiving bortezomib treatment 2 weeks on, 1 week off. Also on dexamethasone as outpatient (no longer).  - f/u outpatient heme-onc for recs  - C/w home medication hydroxyurea 500 mg q12h  - Obtain collateral from patient's PCP/oncologist

## 2020-03-20 NOTE — PROGRESS NOTE ADULT - PROBLEM SELECTOR PLAN 6
Patient has recurrent history of DVT. Heparin drip was started in 8/2019 admission and switched to lovenox 70 mg q12h, but now on eliquis (for DVT/Afib)  - c/w anticoag w/ apixaban as per below    #Afib  Was originally not on anticoag or rate control per outpatient or on rate control per PCP's office (Faby, [phone]). Found to have RVR during hospital course in 8/19  - C/w eliquis  - C/w metoprolol tartrate 25 mg BID

## 2020-03-21 LAB
ALBUMIN SERPL ELPH-MCNC: 2.9 G/DL — LOW (ref 3.3–5)
ALP SERPL-CCNC: 59 U/L — SIGNIFICANT CHANGE UP (ref 40–120)
ALT FLD-CCNC: 18 U/L — SIGNIFICANT CHANGE UP (ref 10–45)
ANION GAP SERPL CALC-SCNC: 11 MMOL/L — SIGNIFICANT CHANGE UP (ref 5–17)
AST SERPL-CCNC: 27 U/L — SIGNIFICANT CHANGE UP (ref 10–40)
BASOPHILS # BLD AUTO: 0 K/UL — SIGNIFICANT CHANGE UP (ref 0–0.2)
BASOPHILS NFR BLD AUTO: 0 % — SIGNIFICANT CHANGE UP (ref 0–2)
BILIRUB SERPL-MCNC: 0.4 MG/DL — SIGNIFICANT CHANGE UP (ref 0.2–1.2)
BUN SERPL-MCNC: 32 MG/DL — HIGH (ref 7–23)
CALCIUM SERPL-MCNC: 8.7 MG/DL — SIGNIFICANT CHANGE UP (ref 8.4–10.5)
CHLORIDE SERPL-SCNC: 106 MMOL/L — SIGNIFICANT CHANGE UP (ref 96–108)
CO2 SERPL-SCNC: 20 MMOL/L — LOW (ref 22–31)
CREAT SERPL-MCNC: 0.85 MG/DL — SIGNIFICANT CHANGE UP (ref 0.5–1.3)
CRP SERPL-MCNC: 0.97 MG/DL — HIGH (ref 0–0.4)
D DIMER BLD IA.RAPID-MCNC: 266 NG/ML DDU — HIGH
EOSINOPHIL # BLD AUTO: 0.04 K/UL — SIGNIFICANT CHANGE UP (ref 0–0.5)
EOSINOPHIL NFR BLD AUTO: 1 % — SIGNIFICANT CHANGE UP (ref 0–6)
FERRITIN SERPL-MCNC: 253 NG/ML — SIGNIFICANT CHANGE UP (ref 30–400)
GLUCOSE SERPL-MCNC: 108 MG/DL — HIGH (ref 70–99)
HCT VFR BLD CALC: 28.9 % — LOW (ref 39–50)
HGB BLD-MCNC: 8.7 G/DL — LOW (ref 13–17)
LYMPHOCYTES # BLD AUTO: 1.76 K/UL — SIGNIFICANT CHANGE UP (ref 1–3.3)
LYMPHOCYTES # BLD AUTO: 42 % — SIGNIFICANT CHANGE UP (ref 13–44)
MAGNESIUM SERPL-MCNC: 1.9 MG/DL — SIGNIFICANT CHANGE UP (ref 1.6–2.6)
MCHC RBC-ENTMCNC: 27.4 PG — SIGNIFICANT CHANGE UP (ref 27–34)
MCHC RBC-ENTMCNC: 30.1 GM/DL — LOW (ref 32–36)
MCV RBC AUTO: 91.2 FL — SIGNIFICANT CHANGE UP (ref 80–100)
MONOCYTES # BLD AUTO: 0.34 K/UL — SIGNIFICANT CHANGE UP (ref 0–0.9)
MONOCYTES NFR BLD AUTO: 8 % — SIGNIFICANT CHANGE UP (ref 2–14)
NEUTROPHILS # BLD AUTO: 2.05 K/UL — SIGNIFICANT CHANGE UP (ref 1.8–7.4)
NEUTROPHILS NFR BLD AUTO: 48 % — SIGNIFICANT CHANGE UP (ref 43–77)
NRBC # BLD: SIGNIFICANT CHANGE UP /100 WBCS (ref 0–0)
PHOSPHATE SERPL-MCNC: 4.1 MG/DL — SIGNIFICANT CHANGE UP (ref 2.5–4.5)
PLATELET # BLD AUTO: 150 K/UL — SIGNIFICANT CHANGE UP (ref 150–400)
POTASSIUM SERPL-MCNC: 4.4 MMOL/L — SIGNIFICANT CHANGE UP (ref 3.5–5.3)
POTASSIUM SERPL-SCNC: 4.4 MMOL/L — SIGNIFICANT CHANGE UP (ref 3.5–5.3)
PROT SERPL-MCNC: 7.4 G/DL — SIGNIFICANT CHANGE UP (ref 6–8.3)
RBC # BLD: 3.17 M/UL — LOW (ref 4.2–5.8)
RBC # FLD: 19.7 % — HIGH (ref 10.3–14.5)
SODIUM SERPL-SCNC: 137 MMOL/L — SIGNIFICANT CHANGE UP (ref 135–145)
WBC # BLD: 4.19 K/UL — SIGNIFICANT CHANGE UP (ref 3.8–10.5)
WBC # FLD AUTO: 4.19 K/UL — SIGNIFICANT CHANGE UP (ref 3.8–10.5)

## 2020-03-21 PROCEDURE — 99232 SBSQ HOSP IP/OBS MODERATE 35: CPT

## 2020-03-21 RX ADMIN — HYDROXYUREA 500 MILLIGRAM(S): 500 CAPSULE ORAL at 05:56

## 2020-03-21 RX ADMIN — APIXABAN 5 MILLIGRAM(S): 2.5 TABLET, FILM COATED ORAL at 05:56

## 2020-03-21 RX ADMIN — LATANOPROST 1 DROP(S): 0.05 SOLUTION/ DROPS OPHTHALMIC; TOPICAL at 21:35

## 2020-03-21 RX ADMIN — Medication 25 MILLIGRAM(S): at 17:30

## 2020-03-21 RX ADMIN — BRIMONIDINE TARTRATE 1 DROP(S): 2 SOLUTION/ DROPS OPHTHALMIC at 05:56

## 2020-03-21 RX ADMIN — TAMSULOSIN HYDROCHLORIDE 0.4 MILLIGRAM(S): 0.4 CAPSULE ORAL at 21:19

## 2020-03-21 RX ADMIN — BRIMONIDINE TARTRATE 1 DROP(S): 2 SOLUTION/ DROPS OPHTHALMIC at 19:40

## 2020-03-21 RX ADMIN — Medication 1 TABLET(S): at 08:56

## 2020-03-21 RX ADMIN — PANTOPRAZOLE SODIUM 40 MILLIGRAM(S): 20 TABLET, DELAYED RELEASE ORAL at 05:56

## 2020-03-21 RX ADMIN — APIXABAN 5 MILLIGRAM(S): 2.5 TABLET, FILM COATED ORAL at 17:30

## 2020-03-21 RX ADMIN — Medication 25 MILLIGRAM(S): at 05:56

## 2020-03-21 RX ADMIN — HYDROXYUREA 500 MILLIGRAM(S): 500 CAPSULE ORAL at 19:09

## 2020-03-21 NOTE — PROGRESS NOTE ADULT - ASSESSMENT
85 y/o male with hx multiple myeloma, prostate CA, pAF (Eliquis), DVTs, Dementia, BPH, PVD, admitted to [facility] with fevers and COVID-19 + exposure.  Pt asymptomatic outside of some fatigue.  Pt now confirmed COVID-19+       Problem/Plan - 1:  ·  Problem: Coronavirus infection.  Plan: PCR confirmed. No cough, sob, fevers while in hospital. No requiring O2.  - Not lymphopenic, serum ferritin ferritin 325, procal 0.36, CRP 3.91, and   - Supportive care.      Problem/Plan - 2:  ·  Problem: BPH (benign prostatic hyperplasia).  Plan: Hx of BPH,  - U/A grossly positive, but patient asymptomatic, afebrile, no elevated WBC: will hold abx for now  - Ucx showing > 100K CFUs for morgonella, stenotrophomonas, MRSA, enterobacter  - Appreciate ID input --> if patient develops signs of systemic infection will treat with TMP / SMX and Zosyn. Will hold off for now.      Problem/Plan - 3:  ·  Problem: Multiple myeloma.  Plan: Hx of multiple myeloma; was receiving bortezomib treatment 2 weeks on, 1 week off. Also on dexamethasone as outpatient (no longer).  - Outpatient hematologist needs to be contacted (still not contacted as of 3/18/2020).   - C/w home medication hydroxyurea 500 mg q12h.      Problem/Plan - 4:  ·  Problem: Glaucoma.  Plan: Hx of glaucoma  - C/w brimonidine and latanoprost eye drops.      Problem/Plan - 5:  ·  Problem: Prostate cancer.  Plan: Hx of prostate ca, s/p brachytherapy, not active issue  - continue to monitor clinically  - Obtain collateral from patient's PCP/oncologist.      Problem/Plan - 6:  Problem: DVT (deep venous thrombosis). Plan: Patient has recurrent history of DVT. Heparin drip was started in 8/2019 admission and switched to lovenox 70 mg q12h, but now on eliquis (for DVT/Afib)  - c/w anticoag w/ apixaban as per below    #Afib  Was originally not on anticoag or rate control per outpatient or on rate control per PCP's office (SARAY Hobbs [phone]). Found to have RVR during hospital course in 8/19  - C/w eliquis  - C/w metoprolol tartrate 25 mg BID.     Problem/Plan - 7:  ·  Problem: Nutrition, metabolism, and development symptoms.  Plan: F: no IVF  E: K>4 Mg>2, monitor and replete as needed  N: DASH diet.      Problem/Plan - 8:  ·  Problem: Prophylactic measure.  Plan: Ppx: eliquis  D: RMF COVID  DNR/DNI per MOLST.

## 2020-03-21 NOTE — PROGRESS NOTE ADULT - SUBJECTIVE AND OBJECTIVE BOX
SUBJECTIVE ASSESSMENT:  84y Male resting in bed in [facility].  No overnight events per nursing.  Pt not interactive during interview.  Pt states he feels well with no complaints.  Denies cough, fevers/chills, nausea, emesis, diarrhea, chest pain, pleuritic pain, orthopnea.      Vital Signs Last 24 Hrs  T(C): 36.9 (21 Mar 2020 09:15), Max: 37.1 (20 Mar 2020 22:53)  T(F): 98.4 (21 Mar 2020 09:15), Max: 98.7 (20 Mar 2020 22:53)  HR: 72 (21 Mar 2020 09:15) (56 - 72)  BP: 112/70 (21 Mar 2020 09:15) (92/55 - 138/62)  BP(mean): --  RR: 18 (21 Mar 2020 09:15) (17 - 19)  SpO2: 95% (21 Mar 2020 09:15) (94% - 100%)  I&O's Detail    20 Mar 2020 07:01  -  21 Mar 2020 07:00  --------------------------------------------------------  IN:  Total IN: 0 mL    OUT:    Indwelling Catheter - Urethral: 1030 mL  Total OUT: 1030 mL    Total NET: -1030 mL    PHYSICAL EXAM:    General: resting in bed in NAD    Neurological: non-focal    Cardiovascular: RRR no M/R/G    Respiratory: +Crackles throughout lung fields bilaterally     Gastrointestinal: soft, non-tender, non-distended     Extremities: no lower extremity edema     Vascular: extremities warm, well perfused     LABS:                        8.7    4.19  )-----------( 150      ( 21 Mar 2020 06:21 )             28.9     03-21    137  |  106  |  32<H>  ----------------------------<  108<H>  4.4   |  20<L>  |  0.85    Ca    8.7      21 Mar 2020 06:21  Phos  4.1     03-21  Mg     1.9     03-21    TPro  7.4  /  Alb  2.9<L>  /  TBili  0.4  /  DBili  x   /  AST  27  /  ALT  18  /  AlkPhos  59  03-21      MEDICATIONS  (STANDING):  apixaban 5 milliGRAM(s) Oral every 12 hours  brimonidine 0.2% Ophthalmic Solution 1 Drop(s) Both EYES two times a day  hydroxyurea 500 milliGRAM(s) Oral every 12 hours  influenza   Vaccine 0.5 milliLiter(s) IntraMuscular once  latanoprost 0.005% Ophthalmic Solution 1 Drop(s) Both EYES at bedtime  metoprolol tartrate 25 milliGRAM(s) Oral every 12 hours  multivitamin 1 Tablet(s) Oral daily  pantoprazole    Tablet 40 milliGRAM(s) Oral before breakfast  tamsulosin 0.4 milliGRAM(s) Oral at bedtime    MEDICATIONS  (PRN):  acetaminophen   Tablet .. 650 milliGRAM(s) Oral every 6 hours PRN Mild Pain (1 - 3)  melatonin 5 milliGRAM(s) Oral at bedtime PRN Insomnia        RADIOLOGY & ADDITIONAL TESTS:

## 2020-03-22 RX ORDER — SODIUM CHLORIDE 9 MG/ML
500 INJECTION, SOLUTION INTRAVENOUS ONCE
Refills: 0 | Status: COMPLETED | OUTPATIENT
Start: 2020-03-22 | End: 2020-03-22

## 2020-03-22 RX ADMIN — PANTOPRAZOLE SODIUM 40 MILLIGRAM(S): 20 TABLET, DELAYED RELEASE ORAL at 06:28

## 2020-03-22 RX ADMIN — APIXABAN 5 MILLIGRAM(S): 2.5 TABLET, FILM COATED ORAL at 18:00

## 2020-03-22 RX ADMIN — HYDROXYUREA 500 MILLIGRAM(S): 500 CAPSULE ORAL at 18:15

## 2020-03-22 RX ADMIN — Medication 25 MILLIGRAM(S): at 05:15

## 2020-03-22 RX ADMIN — HYDROXYUREA 500 MILLIGRAM(S): 500 CAPSULE ORAL at 05:14

## 2020-03-22 RX ADMIN — SODIUM CHLORIDE 250 MILLILITER(S): 9 INJECTION, SOLUTION INTRAVENOUS at 22:30

## 2020-03-22 RX ADMIN — BRIMONIDINE TARTRATE 1 DROP(S): 2 SOLUTION/ DROPS OPHTHALMIC at 18:16

## 2020-03-22 RX ADMIN — TAMSULOSIN HYDROCHLORIDE 0.4 MILLIGRAM(S): 0.4 CAPSULE ORAL at 22:35

## 2020-03-22 RX ADMIN — Medication 1 TABLET(S): at 11:23

## 2020-03-22 RX ADMIN — BRIMONIDINE TARTRATE 1 DROP(S): 2 SOLUTION/ DROPS OPHTHALMIC at 05:14

## 2020-03-22 RX ADMIN — Medication 25 MILLIGRAM(S): at 18:14

## 2020-03-22 RX ADMIN — APIXABAN 5 MILLIGRAM(S): 2.5 TABLET, FILM COATED ORAL at 05:14

## 2020-03-22 NOTE — PROGRESS NOTE ADULT - SUBJECTIVE AND OBJECTIVE BOX
SUBJECTIVE ASSESSMENT:  84y Male resting in bed in [facility].  No overnight events per nursing.  Pt not interactive during interview.  Denies cough, fevers/chills, nausea, emesis, diarrhea, chest pain, pleuritic pain, orthopnea.          Vital Signs Last 24 Hrs  T(C): 37.2 (22 Mar 2020 05:45), Max: 37.2 (21 Mar 2020 17:59)  T(F): 98.9 (22 Mar 2020 05:45), Max: 99 (21 Mar 2020 20:51)  HR: 60 (22 Mar 2020 05:45) (60 - 76)  BP: 141/69 (22 Mar 2020 05:45) (117/68 - 151/77)  BP(mean): --  RR: 18 (22 Mar 2020 05:45) (18 - 19)  SpO2: 96% (22 Mar 2020 05:45) (95% - 96%)  I&O's Summary    21 Mar 2020 07:01  -  22 Mar 2020 07:00  --------------------------------------------------------  IN: 0 mL / OUT: 1600 mL / NET: -1600 mL      PHYSICAL EXAM:    General: resting in bed in NAD    Neurological: non-focal    Cardiovascular: RRR no M/R/G    Respiratory: +Crackles throughout lung fields bilaterally     Gastrointestinal: soft, non-tender, non-distended     Extremities: no lower extremity edema     Vascular: extremities warm, well perfused       LABS:                        8.7    4.19  )-----------( 150      ( 21 Mar 2020 06:21 )             28.9     03-21    137  |  106  |  32<H>  ----------------------------<  108<H>  4.4   |  20<L>  |  0.85    Ca    8.7      21 Mar 2020 06:21  Phos  4.1     03-21  Mg     1.9     03-21    TPro  7.4  /  Alb  2.9<L>  /  TBili  0.4  /  DBili  x   /  AST  27  /  ALT  18  /  AlkPhos  59  03-21

## 2020-03-22 NOTE — PROGRESS NOTE ADULT - ASSESSMENT
83 y/o male with hx multiple myeloma, prostate CA, pAF (Eliquis), DVTs, Dementia, BPH, PVD, admitted to [facility] with fevers and COVID-19 + exposure.  Pt asymptomatic outside of some fatigue.  Pt now confirmed COVID-19+, repeated COVID swab sent today 3/22       Problem/Plan - 1:  ·  Problem: Coronavirus infection.  Plan: PCR confirmed. No cough, sob, fevers while in hospital. No requiring O2.  - Not lymphopenic, serum ferritin ferritin 325, procal 0.36, CRP 3.91, and   - Supportive care.   - Re-swabbed for COVID 3/22, pending results       Problem/Plan - 2:  ·  Problem: BPH (benign prostatic hyperplasia).  Plan: Hx of BPH,  - U/A grossly positive, but patient asymptomatic, afebrile, no elevated WBC: will hold abx for now  - Ucx showing > 100K CFUs for morgonella, stenotrophomonas, MRSA, enterobacter  - Appreciate ID input --> if patient develops signs of systemic infection will treat with TMP / SMX and Zosyn. Will hold off for now.      Problem/Plan - 3:  ·  Problem: Multiple myeloma.  Plan: Hx of multiple myeloma; was receiving bortezomib treatment 2 weeks on, 1 week off. Also on dexamethasone as outpatient (no longer).  - Outpatient hematologist needs to be contacted (still not contacted as of 3/18/2020).   - C/w home medication hydroxyurea 500 mg q12h.      Problem/Plan - 4:  ·  Problem: Glaucoma.  Plan: Hx of glaucoma  - C/w brimonidine and latanoprost eye drops.      Problem/Plan - 5:  ·  Problem: Prostate cancer.  Plan: Hx of prostate ca, s/p brachytherapy, not active issue  - continue to monitor clinically  - Obtain collateral from patient's PCP/oncologist.      Problem/Plan - 6:  Problem: DVT (deep venous thrombosis). Plan: Patient has recurrent history of DVT. Heparin drip was started in 8/2019 admission and switched to lovenox 70 mg q12h, but now on eliquis (for DVT/Afib)  - c/w anticoag w/ apixaban as per below    #Afib  Was originally not on anticoag or rate control per outpatient or on rate control per PCP's office (Faby, [phone]). Found to have RVR during hospital course in 8/19  - C/w eliquis  - C/w metoprolol tartrate 25 mg BID.     Problem/Plan - 7:  ·  Problem: Nutrition, metabolism, and development symptoms.  Plan: F: no IVF  E: K>4 Mg>2, monitor and replete as needed  N: DASH diet.      Problem/Plan - 8:  ·  Problem: Prophylactic measure.  Plan: Ppx: eliquis  D: COLEMANF COVID  DNR/DNI per MOLST.

## 2020-03-23 ENCOUNTER — TRANSCRIPTION ENCOUNTER (OUTPATIENT)
Age: 85
End: 2020-03-23

## 2020-03-23 LAB — SARS-COV-2 RNA SPEC QL NAA+PROBE: SIGNIFICANT CHANGE UP

## 2020-03-23 PROCEDURE — 83735 ASSAY OF MAGNESIUM: CPT

## 2020-03-23 PROCEDURE — 87040 BLOOD CULTURE FOR BACTERIA: CPT

## 2020-03-23 PROCEDURE — 81001 URINALYSIS AUTO W/SCOPE: CPT

## 2020-03-23 PROCEDURE — 80048 BASIC METABOLIC PNL TOTAL CA: CPT

## 2020-03-23 PROCEDURE — 84145 PROCALCITONIN (PCT): CPT

## 2020-03-23 PROCEDURE — 83615 LACTATE (LD) (LDH) ENZYME: CPT

## 2020-03-23 PROCEDURE — 84100 ASSAY OF PHOSPHORUS: CPT

## 2020-03-23 PROCEDURE — 85730 THROMBOPLASTIN TIME PARTIAL: CPT

## 2020-03-23 PROCEDURE — 82962 GLUCOSE BLOOD TEST: CPT

## 2020-03-23 PROCEDURE — 93005 ELECTROCARDIOGRAM TRACING: CPT

## 2020-03-23 PROCEDURE — 85610 PROTHROMBIN TIME: CPT

## 2020-03-23 PROCEDURE — 87635 SARS-COV-2 COVID-19 AMP PRB: CPT

## 2020-03-23 PROCEDURE — 86140 C-REACTIVE PROTEIN: CPT

## 2020-03-23 PROCEDURE — 99285 EMERGENCY DEPT VISIT HI MDM: CPT | Mod: 25

## 2020-03-23 PROCEDURE — 80053 COMPREHEN METABOLIC PANEL: CPT

## 2020-03-23 PROCEDURE — 87633 RESP VIRUS 12-25 TARGETS: CPT

## 2020-03-23 PROCEDURE — 71045 X-RAY EXAM CHEST 1 VIEW: CPT

## 2020-03-23 PROCEDURE — 87086 URINE CULTURE/COLONY COUNT: CPT

## 2020-03-23 PROCEDURE — 87486 CHLMYD PNEUM DNA AMP PROBE: CPT

## 2020-03-23 PROCEDURE — 87798 DETECT AGENT NOS DNA AMP: CPT

## 2020-03-23 PROCEDURE — 82728 ASSAY OF FERRITIN: CPT

## 2020-03-23 PROCEDURE — 87186 SC STD MICRODIL/AGAR DIL: CPT

## 2020-03-23 PROCEDURE — 85025 COMPLETE CBC W/AUTO DIFF WBC: CPT

## 2020-03-23 PROCEDURE — 87581 M.PNEUMON DNA AMP PROBE: CPT

## 2020-03-23 PROCEDURE — 85379 FIBRIN DEGRADATION QUANT: CPT

## 2020-03-23 PROCEDURE — 85027 COMPLETE CBC AUTOMATED: CPT

## 2020-03-23 PROCEDURE — 83605 ASSAY OF LACTIC ACID: CPT

## 2020-03-23 PROCEDURE — 96374 THER/PROPH/DIAG INJ IV PUSH: CPT

## 2020-03-23 PROCEDURE — 36415 COLL VENOUS BLD VENIPUNCTURE: CPT

## 2020-03-23 RX ADMIN — BRIMONIDINE TARTRATE 1 DROP(S): 2 SOLUTION/ DROPS OPHTHALMIC at 18:02

## 2020-03-23 RX ADMIN — Medication 25 MILLIGRAM(S): at 18:02

## 2020-03-23 RX ADMIN — LATANOPROST 1 DROP(S): 0.05 SOLUTION/ DROPS OPHTHALMIC; TOPICAL at 01:00

## 2020-03-23 RX ADMIN — HYDROXYUREA 500 MILLIGRAM(S): 500 CAPSULE ORAL at 06:29

## 2020-03-23 RX ADMIN — PANTOPRAZOLE SODIUM 40 MILLIGRAM(S): 20 TABLET, DELAYED RELEASE ORAL at 07:30

## 2020-03-23 RX ADMIN — Medication 25 MILLIGRAM(S): at 06:29

## 2020-03-23 RX ADMIN — APIXABAN 5 MILLIGRAM(S): 2.5 TABLET, FILM COATED ORAL at 06:28

## 2020-03-23 RX ADMIN — APIXABAN 5 MILLIGRAM(S): 2.5 TABLET, FILM COATED ORAL at 18:02

## 2020-03-23 RX ADMIN — BRIMONIDINE TARTRATE 1 DROP(S): 2 SOLUTION/ DROPS OPHTHALMIC at 06:00

## 2020-03-23 RX ADMIN — LATANOPROST 1 DROP(S): 0.05 SOLUTION/ DROPS OPHTHALMIC; TOPICAL at 22:00

## 2020-03-23 RX ADMIN — TAMSULOSIN HYDROCHLORIDE 0.4 MILLIGRAM(S): 0.4 CAPSULE ORAL at 22:00

## 2020-03-23 RX ADMIN — Medication 1 TABLET(S): at 11:44

## 2020-03-23 RX ADMIN — HYDROXYUREA 500 MILLIGRAM(S): 500 CAPSULE ORAL at 18:02

## 2020-03-23 NOTE — DISCHARGE NOTE PROVIDER - CARE PROVIDER_API CALL
Ki Rooney)  Internal Medicine  [address] [address]  Phone: [phone]  Fax: [phone]  Follow Up Time: 1 week

## 2020-03-23 NOTE — PROGRESS NOTE ADULT - ASSESSMENT
83 y/o male with hx multiple myeloma, prostate CA, pAF (Eliquis), DVTs, Dementia, BPH, PVD, admitted to [facility] from [facility] with fevers and COVID-19 + exposure.  Pt asymptomatic outside of some fatigue.  Pt now confirmed COVID-19+, f/u COVID swab from yesterday       Problem/Plan - 1:  ·  Problem: Coronavirus infection.  Plan: PCR confirmed. No cough, sob, fevers while in hospital. No requiring O2.  - Not lymphopenic, serum ferritin ferritin 325, procal 0.36, CRP 3.91, and   - Supportive care.   - Re-swabbed for COVID 3/22, pending results       Problem/Plan - 2:  ·  Problem: BPH (benign prostatic hyperplasia).  Plan: Hx of BPH,  - U/A grossly positive, but patient asymptomatic, afebrile, no elevated WBC: will hold abx for now  - Ucx showing > 100K CFUs for morgonella, stenotrophomonas, MRSA, enterobacter  - will f/u with ID regarding starting Bactrim     Problem/Plan - 3:  ·  Problem: Multiple myeloma.  Plan: Hx of multiple myeloma; was receiving bortezomib treatment 2 weeks on, 1 week off. Also on dexamethasone as outpatient (no longer).  - Outpatient hematologist needs to be contacted (still not contacted as of 3/18/2020).   - C/w home medication hydroxyurea 500 mg q12h.      Problem/Plan - 4:  ·  Problem: Glaucoma.  Plan: Hx of glaucoma  - C/w brimonidine and latanoprost eye drops.      Problem/Plan - 5:  ·  Problem: Prostate cancer.  Plan: Hx of prostate ca, s/p brachytherapy, not active issue  - continue to monitor clinically  - Obtain collateral from patient's PCP/oncologist.      Problem/Plan - 6:  Problem: DVT (deep venous thrombosis). Plan: Patient has recurrent history of DVT. Heparin drip was started in 8/2019 admission and switched to lovenox 70 mg q12h, but now on eliquis (for DVT/Afib)  - c/w anticoag w/ apixaban as per below    #Afib  Was originally not on anticoag or rate control per outpatient or on rate control per PCP's office (SARAY Hobbs [phone]). Found to have RVR during hospital course in 8/19  - C/w eliquis  - C/w metoprolol tartrate 25 mg BID.     Problem/Plan - 7:  ·  Problem: Nutrition, metabolism, and development symptoms.  Plan: F: no IVF  E: K>4 Mg>2, monitor and replete as needed  N: DASH diet.      Problem/Plan - 8:  ·  Problem: Prophylactic measure.  Plan: Ppx: eliquis  D: RMF COVID  DNR/DNI per MOLST 83 y/o male with hx multiple myeloma, prostate CA, pAF (Eliquis), DVTs, Dementia, BPH, PVD, admitted to [facility] from [facility] with fevers and COVID-19 + exposure.  Pt asymptomatic outside of some fatigue.  Pt now confirmed COVID-19+, f/u COVID swab from yesterday       Problem/Plan - 1:  ·  Problem: Coronavirus infection.  Plan: PCR confirmed. No cough, sob, fevers while in hospital. No requiring O2.  - Not lymphopenic, serum ferritin ferritin 325, procal 0.36, CRP 3.91, and   - Supportive care.   - Re-swabbed for COVID 3/22, pending results       Problem/Plan - 2:  ·  Problem: BPH (benign prostatic hyperplasia).  Plan: Hx of BPH,  - U/A grossly positive, but patient asymptomatic, afebrile, no elevated WBC: will hold abx for now  - Ucx showing > 100K CFUs for morgonella, stenotrophomonas, MRSA, enterobacter     Problem/Plan - 3:  ·  Problem: Multiple myeloma.  Plan: Hx of multiple myeloma; was receiving bortezomib treatment 2 weeks on, 1 week off. Also on dexamethasone as outpatient (no longer).  - Outpatient hematologist needs to be contacted (still not contacted as of 3/18/2020).   - C/w home medication hydroxyurea 500 mg q12h.      Problem/Plan - 4:  ·  Problem: Glaucoma.  Plan: Hx of glaucoma  - C/w brimonidine and latanoprost eye drops.      Problem/Plan - 5:  ·  Problem: Prostate cancer.  Plan: Hx of prostate ca, s/p brachytherapy, not active issue  - continue to monitor clinically  - Obtain collateral from patient's PCP/oncologist.      Problem/Plan - 6:  Problem: DVT (deep venous thrombosis). Plan: Patient has recurrent history of DVT. Heparin drip was started in 8/2019 admission and switched to lovenox 70 mg q12h, but now on eliquis (for DVT/Afib)  - c/w anticoag w/ apixaban as per below    #Afib  Was originally not on anticoag or rate control per outpatient or on rate control per PCP's office (Faby, [phone]). Found to have RVR during hospital course in 8/19  - C/w eliquis  - C/w metoprolol tartrate 25 mg BID.     Problem/Plan - 7:  ·  Problem: Nutrition, metabolism, and development symptoms.  Plan: F: no IVF  E: K>4 Mg>2, monitor and replete as needed  N: DASH diet.      Problem/Plan - 8:  ·  Problem: Prophylactic measure.  Plan: Ppx: eliquis  D: RMF COVID  DNR/DNI per MOLST

## 2020-03-23 NOTE — DISCHARGE NOTE PROVIDER - HOSPITAL COURSE
84M with hx multiple myeloma, prostate CA (s/p brachytherapy), pAfib (on eliquis), recurrent DVTs, dementia, BPH, PVD, presented on day of admission with subjective fever possibly up to 101.3 (per ED provider from signout) from [facility] rehab. Admitted to medical service for COVID-19 rule out given subjective [facility] where multiple COVID-19 positive patients have been noted. Patient's hospital course was without acute events. COVID 19 test resulted on 3/17 as positive and patient continued to be in appropriate isolation precautions. Patient was reswabbed on 3/22 and was resulted on 3/23 as nondetected. Patient has been afebrile with symptomatic improvement for 72 hours on day of discharge, and was cleared to go back to [facility] rehab. 84M with hx multiple myeloma, prostate CA (s/p brachytherapy), pAfib (on eliquis), recurrent DVTs, dementia, BPH, PVD, presented on day of admission with subjective fever possibly up to 101.3 (per ED provider from signout) from [facility] rehab. Admitted to medical service for COVID-19 rule out given subjective [facility] COVID-19 positive patients have been noted. Patient's hospital course was without acute events. COVID 19 test resulted on 3/17 as positive and patient continued to be in appropriate isolation precautions. Patient was reswabbed on 3/22 and was resulted on 3/23 as nondetected. Patient has been afebrile with symptomatic improvement for 72 hours on day of discharge with no airborne precautions, and was cleared to go back to [facility] rehab.

## 2020-03-23 NOTE — DISCHARGE NOTE PROVIDER - NSDCMRMEDTOKEN_GEN_ALL_CORE_FT
apixaban 5 mg oral tablet: 1 tab(s) orally 2 times a day  brimonidine 0.1% ophthalmic solution: 1 drop(s) to each affected eye 2 times a day  hydroxyurea 500 mg oral capsule: 1  orally 2 times a day  latanoprost 0.005% ophthalmic solution: 1 drop(s) to each affected eye once a day (in the evening)  melatonin 5 mg oral tablet: 1 tab(s) orally once a day (at bedtime), As needed, Sleep  metoprolol tartrate 25 mg oral tablet: 1 tab(s) orally 2 times a day  pantoprazole 40 mg oral delayed release tablet: 1 tab(s) orally once a day (before a meal)  tamsulosin 0.4 mg oral capsule: 1 cap(s) orally once a day  Vitamin B Complex 100:  injectable acetaminophen 325 mg oral tablet: 2 tab(s) orally every 6 hours, As needed, Mild Pain (1 - 3)  apixaban 5 mg oral tablet: 1 tab(s) orally 2 times a day  brimonidine 0.1% ophthalmic solution: 1 drop(s) to each affected eye 2 times a day  hydroxyurea 500 mg oral capsule: 1  orally 2 times a day  latanoprost 0.005% ophthalmic solution: 1 drop(s) to each affected eye once a day (in the evening)  melatonin 5 mg oral tablet: 1 tab(s) orally once a day (at bedtime), As needed, Sleep  metoprolol tartrate 25 mg oral tablet: 1 tab(s) orally 2 times a day  pantoprazole 40 mg oral delayed release tablet: 1 tab(s) orally once a day (before a meal)  tamsulosin 0.4 mg oral capsule: 1 cap(s) orally once a day  Vitamin B Complex 100:  injectable

## 2020-03-23 NOTE — CHART NOTE - NSCHARTNOTEFT_GEN_A_CORE
Admitting Diagnosis:   Patient is a 84y old  Male who presents with a chief complaint of COVID-19 rule out (23 Mar 2020 12:11)      PAST MEDICAL & SURGICAL HISTORY:  BPH (benign prostatic hyperplasia)  Glaucoma  DVT (deep venous thrombosis)  Myeloma  Prostate cancer  H/O hernia repair      Current Nutrition Order: DASH/TLC    PO Intake: Good (%) [   ]  Fair (50-75%) [   ] Poor (<25%) [   ]  Unable to quantify- per RN pt was fed jello and pudding this am w/ good tolerance    GI Issues:   No apparent GI distress  No N/V  Fecal incontinence  Last BM 3/22    Pain:  No pain reported     Skin Integrity:  Pa score 13  Intact pressure wise    Labs:         CAPILLARY BLOOD GLUCOSE          Medications:  MEDICATIONS  (STANDING):  apixaban 5 milliGRAM(s) Oral every 12 hours  brimonidine 0.2% Ophthalmic Solution 1 Drop(s) Both EYES two times a day  hydroxyurea 500 milliGRAM(s) Oral every 12 hours  influenza   Vaccine 0.5 milliLiter(s) IntraMuscular once  latanoprost 0.005% Ophthalmic Solution 1 Drop(s) Both EYES at bedtime  metoprolol tartrate 25 milliGRAM(s) Oral every 12 hours  multivitamin 1 Tablet(s) Oral daily  pantoprazole    Tablet 40 milliGRAM(s) Oral before breakfast  tamsulosin 0.4 milliGRAM(s) Oral at bedtime    MEDICATIONS  (PRN):  acetaminophen   Tablet .. 650 milliGRAM(s) Oral every 6 hours PRN Mild Pain (1 - 3)  melatonin 5 milliGRAM(s) Oral at bedtime PRN Insomnia      Weight: 156lbs  Height: 6'2", IBW 190lbs+/-10%, %IBW 82%, BMI 20   Daily     Weight Change:   Noted, at prior admission in August 2019 pt weighed 154lbs- wt appears to be stable.     Estimated energy needs:   ABW used for calculations as pt between % of IBW.   Nutrient needs based on [facility] standards of care for maintenance in older adults.   Needs adjusted for age, catabolic illness, hypermetabolic state 2/2 COVID  1757-2109kcal/day (25-30kcal/kg)  84-98g pro/day (1.2-1.4g pro/kg)  2109-2460ml fluid/day (30-35ml/kg)    Subjective:   83yo M with hx multiple myeloma, prostate CA, pAF (Eliquis), DVTs, Dementia, BPH, PVD, admitted to [facility] with fevers and COVID-19 + exposure.  Pt asymptomatic outside of some fatigue.  Pt now confirmed COVID-19+, f/u COVID swab from yesterday, still pending. Unable to conduct a face to face interview or nutrition focused physical exam due to limited contact restrictions related to their medical condition and isolation precautions. Unable to reach patient by room phone. Nutrition course remains the same. Currently on a DASH/TLC diet and tolerating PO- per RN, pt consumed jello and pudding this morning. Noted by RD at prior admission (8/2019) pt was edentulous requiring dentures.  Requiring total assistance w/ feedings. No apparent GI distress, no N/V, fecal incontinence, last BM 3/22. Appreciate support and encouragement at meal times. Please trend weights and document PO intake. RD to follow.     Previous Nutrition Diagnosis:  increased nutrient needs RT increased demand for kcal/fluids AEB age, multiple myeloma, COVID19  Active [ x  ]  Resolved [   ]    If resolved, new PES:     Goal:  Pt to consistently meet % of estimated needs PO     Recommendations:  1. Continue on DASH/TLC diet as tolerated  2. EnsureEnlive BID (700kcal, 40g pro), Ensure Pudding TID (510kcal, 12g pro)  3. Trend weights  4. Monitor for issues chewing/swallowing    Education:   N/A     Risk Level: High [  x ] Moderate [   ] Low [   ]

## 2020-03-23 NOTE — PROGRESS NOTE ADULT - SUBJECTIVE AND OBJECTIVE BOX
SUBJECTIVE:  Received 500 cc LR bolus for low urine output overnight    OBJECTIVE:     ** VITAL SIGNS / I&O's **    Vital Signs Last 24 Hrs  T(C): 37.6 (23 Mar 2020 09:30), Max: 37.6 (22 Mar 2020 21:30)  T(F): 99.6 (23 Mar 2020 09:30), Max: 99.7 (22 Mar 2020 21:30)  HR: 63 (23 Mar 2020 09:30) (63 - 91)  BP: 91/55 (23 Mar 2020 09:30) (91/55 - 132/71)  BP(mean): --  RR: 16 (23 Mar 2020 09:30) (16 - 16)  SpO2: 94% (23 Mar 2020 09:30) (92% - 96%)      22 Mar 2020 07:01  -  23 Mar 2020 07:00  --------------------------------------------------------  IN:    Lactated Ringers IV Bolus: 500 mL  Total IN: 500 mL    OUT:    Indwelling Catheter - Urethral: 435 mL  Total OUT: 435 mL    Total NET: 65 mL          ** PHYSICAL EXAM **    General: resting in bed in NAD  Cardiovascular: RRR no M/R/G  Respiratory: no respiratory distress, lungs CTA b/l  Extremities: no lower extremity edema   Vascular: extremities warm, well perfused   : díaz in place with minimal urine output, díaz flushed      ** LABS **              CAPILLARY BLOOD GLUCOSE

## 2020-03-24 ENCOUNTER — TRANSCRIPTION ENCOUNTER (OUTPATIENT)
Age: 85
End: 2020-03-24

## 2020-03-24 VITALS
DIASTOLIC BLOOD PRESSURE: 73 MMHG | HEART RATE: 77 BPM | SYSTOLIC BLOOD PRESSURE: 128 MMHG | RESPIRATION RATE: 16 BRPM | TEMPERATURE: 99 F | OXYGEN SATURATION: 95 %

## 2020-03-24 RX ORDER — ACETAMINOPHEN 500 MG
2 TABLET ORAL
Qty: 0 | Refills: 0 | DISCHARGE
Start: 2020-03-24

## 2020-03-24 NOTE — DISCHARGE NOTE NURSING/CASE MANAGEMENT/SOCIAL WORK - PATIENT PORTAL LINK FT
You can access the FollowMyHealth Patient Portal offered by [facility] by registering at the following website: [email or web address]. By joining [facility]’s FollowMyHealth portal, you will also be able to view your health information using other applications (apps) compatible with our system.

## 2020-03-24 NOTE — PROGRESS NOTE ADULT - REASON FOR ADMISSION
COVID-19 rule out

## 2020-03-24 NOTE — PROGRESS NOTE ADULT - ASSESSMENT
83 y/o male with hx multiple myeloma, prostate CA, pAF (Eliquis), DVTs, Dementia, BPH, PVD, admitted to [facility] from [facility] with fevers and COVID-19 + exposure. COVID test from 3/15 postive, Pt asymptomatic.  Pt now confirmed COVID NEGATIVE per 3/22 test. Plan to return to [facility] pending authorization.       Problem/Plan - 1:  ·  Problem: Coronavirus infection.  Plan: PCR confirmed. No cough, sob, fevers while in hospital. No requiring O2.  - Not lymphopenic, serum ferritin ferritin 325, procal 0.36, CRP 3.91, and   - Supportive care.   - COVID 3/22 negative       Problem/Plan - 2:  ·  Problem: BPH (benign prostatic hyperplasia).  Plan: Hx of BPH,  - U/A grossly positive, but patient asymptomatic, afebrile, no elevated WBC: will hold abx for now  - Ucx showing > 100K CFUs for morgonella, stenotrophomonas, MRSA, enterobacter     Problem/Plan - 3:  ·  Problem: Multiple myeloma.  Plan: Hx of multiple myeloma; was receiving bortezomib treatment 2 weeks on, 1 week off. Also on dexamethasone as outpatient (no longer).  - Outpatient hematologist needs to be contacted (still not contacted as of 3/18/2020).   - C/w home medication hydroxyurea 500 mg q12h.      Problem/Plan - 4:  ·  Problem: Glaucoma.  Plan: Hx of glaucoma  - C/w brimonidine and latanoprost eye drops.      Problem/Plan - 5:  ·  Problem: Prostate cancer.  Plan: Hx of prostate ca, s/p brachytherapy, not active issue  - continue to monitor clinically  - Obtain collateral from patient's PCP/oncologist.      Problem/Plan - 6:  Problem: DVT (deep venous thrombosis). Plan: Patient has recurrent history of DVT. Heparin drip was started in 8/2019 admission and switched to lovenox 70 mg q12h, but now on eliquis (for DVT/Afib)  - c/w anticoag w/ apixaban as per below    #Afib  Was originally not on anticoag or rate control per outpatient or on rate control per PCP's office (SARAY Hobbs [phone]). Found to have RVR during hospital course in 8/19  - C/w eliquis  - C/w metoprolol tartrate 25 mg BID.     Problem/Plan - 7:  ·  Problem: Nutrition, metabolism, and development symptoms.  Plan: F: no IVF  E: K>4 Mg>2, monitor and replete as needed  N: DASH diet.      Problem/Plan - 8:  ·  Problem: Prophylactic measure.  Plan: Ppx: eliquis  D: RMF COVID  DNR/DNI per [facility]

## 2020-03-24 NOTE — PROGRESS NOTE ADULT - SUBJECTIVE AND OBJECTIVE BOX
SUBJECTIVE:  No overnight events. COVID retest from 3/22 negative. Low grade temp 100F last night.    OBJECTIVE:     ** VITAL SIGNS / I&O's **    Vital Signs Last 24 Hrs  T(C): 37.4 (24 Mar 2020 06:50), Max: 37.8 (23 Mar 2020 22:00)  T(F): 99.4 (24 Mar 2020 06:50), Max: 100 (23 Mar 2020 22:00)  HR: 78 (24 Mar 2020 06:50) (63 - 78)  BP: 115/66 (24 Mar 2020 06:50) (91/55 - 128/75)  BP(mean): --  RR: 14 (24 Mar 2020 06:50) (14 - 16)  SpO2: 95% (24 Mar 2020 06:50) (94% - 95%)      23 Mar 2020 07:01  -  24 Mar 2020 07:00  --------------------------------------------------------  IN:    Oral Fluid: 125 mL  Total IN: 125 mL    OUT:    Indwelling Catheter - Urethral: 575 mL  Total OUT: 575 mL    Total NET: -450 mL          ** PHYSICAL EXAM **    General: resting in bed in NAD  Cardiovascular: RRR no M/R/G  Respiratory: no respiratory distress, lungs CTA b/l  Extremities: no lower extremity edema   Vascular: extremities warm, well perfused   : díaz in place, no leaks        ** LABS **              CAPILLARY BLOOD GLUCOSE

## 2020-03-30 DIAGNOSIS — I73.9 PERIPHERAL VASCULAR DISEASE, UNSPECIFIED: ICD-10-CM

## 2020-03-30 DIAGNOSIS — Z99.3 DEPENDENCE ON WHEELCHAIR: ICD-10-CM

## 2020-03-30 DIAGNOSIS — F03.90 UNSPECIFIED DEMENTIA WITHOUT BEHAVIORAL DISTURBANCE: ICD-10-CM

## 2020-03-30 DIAGNOSIS — D64.9 ANEMIA, UNSPECIFIED: ICD-10-CM

## 2020-03-30 DIAGNOSIS — H40.9 UNSPECIFIED GLAUCOMA: ICD-10-CM

## 2020-03-30 DIAGNOSIS — Z66 DO NOT RESUSCITATE: ICD-10-CM

## 2020-03-30 DIAGNOSIS — Z79.01 LONG TERM (CURRENT) USE OF ANTICOAGULANTS: ICD-10-CM

## 2020-03-30 DIAGNOSIS — Z79.82 LONG TERM (CURRENT) USE OF ASPIRIN: ICD-10-CM

## 2020-03-30 DIAGNOSIS — F32.9 MAJOR DEPRESSIVE DISORDER, SINGLE EPISODE, UNSPECIFIED: ICD-10-CM

## 2020-03-30 DIAGNOSIS — Z92.3 PERSONAL HISTORY OF IRRADIATION: ICD-10-CM

## 2020-03-30 DIAGNOSIS — Z79.899 OTHER LONG TERM (CURRENT) DRUG THERAPY: ICD-10-CM

## 2020-03-30 DIAGNOSIS — I48.0 PAROXYSMAL ATRIAL FIBRILLATION: ICD-10-CM

## 2020-03-30 DIAGNOSIS — Z74.01 BED CONFINEMENT STATUS: ICD-10-CM

## 2020-03-30 DIAGNOSIS — R50.9 FEVER, UNSPECIFIED: ICD-10-CM

## 2020-03-30 DIAGNOSIS — R53.83 OTHER FATIGUE: ICD-10-CM

## 2020-03-30 DIAGNOSIS — L81.9 DISORDER OF PIGMENTATION, UNSPECIFIED: ICD-10-CM

## 2020-03-30 DIAGNOSIS — C90.00 MULTIPLE MYELOMA NOT HAVING ACHIEVED REMISSION: ICD-10-CM

## 2020-03-30 DIAGNOSIS — B97.29 OTHER CORONAVIRUS AS THE CAUSE OF DISEASES CLASSIFIED ELSEWHERE: ICD-10-CM

## 2020-03-30 DIAGNOSIS — N40.0 BENIGN PROSTATIC HYPERPLASIA WITHOUT LOWER URINARY TRACT SYMPTOMS: ICD-10-CM

## 2020-03-30 DIAGNOSIS — Z85.46 PERSONAL HISTORY OF MALIGNANT NEOPLASM OF PROSTATE: ICD-10-CM

## 2020-03-30 DIAGNOSIS — Z86.718 PERSONAL HISTORY OF OTHER VENOUS THROMBOSIS AND EMBOLISM: ICD-10-CM

## 2020-12-15 PROBLEM — N39.0 ACUTE LOWER UTI: Status: RESOLVED | Noted: 2017-09-25 | Resolved: 2020-12-15

## 2022-11-14 NOTE — ED PROVIDER NOTE - CARE PLAN
Patient would like call back with results from recent labs
Spoke to the patient. Cholesterol numbers are very high despite taking pravastatin. Prediabetic status so discussed moderate intake of carbohydrates and starchy foods and sweets. Stop pravastatin and start Crestor.   Follow please in 4 months or so
Principal Discharge DX:	Musculoskeletal pain

## 2024-01-02 NOTE — DISCHARGE NOTE PROVIDER - NSDCCPGOAL_GEN_ALL_CORE_FT
To get better and follow your care plan as instructed.
What Type Of Note Output Would You Prefer (Optional)?: Standard Output
How Severe Is Your Skin Lesion?: moderate
Is This A New Presentation, Or A Follow-Up?: Skin Lesion

## 2025-05-08 NOTE — PHYSICAL THERAPY INITIAL EVALUATION ADULT - LEVEL OF INDEPENDENCE: GAIT, REHAB EVAL
The catheter was inserted into the and was inserted over the wire into the left ventricle. Hemodynamics were performed.  and Pullback was recorded.  Ef 15% contact guard